# Patient Record
Sex: FEMALE | Race: WHITE | NOT HISPANIC OR LATINO | Employment: OTHER | URBAN - METROPOLITAN AREA
[De-identification: names, ages, dates, MRNs, and addresses within clinical notes are randomized per-mention and may not be internally consistent; named-entity substitution may affect disease eponyms.]

---

## 2017-01-16 DIAGNOSIS — E89.40 SURGICAL MENOPAUSE: ICD-10-CM

## 2017-01-16 NOTE — TELEPHONE ENCOUNTER
----- Message from Ephraim Cowart sent at 2017  8:30 AM CST -----  Contact: Patient  Jessi Linton  MRN: [id number]  : 1965  PCP: David Allred  Home Phone      [phone]  Work Phone      Not on file.  Mobile          [phone]      MESSAGE: requesting refill on Premarin -- pharmacy states Rx is inactive -- uses [facility]    Call [phone]    PCP: Brigida

## 2017-02-22 RX ORDER — PROPRANOLOL HYDROCHLORIDE 20 MG/1
TABLET ORAL
Qty: 60 TABLET | Refills: 5 | Status: SHIPPED | OUTPATIENT
Start: 2017-02-22 | End: 2017-09-09 | Stop reason: SDUPTHER

## 2017-03-13 DIAGNOSIS — R05.9 COUGH: ICD-10-CM

## 2017-03-13 DIAGNOSIS — J45.41 MODERATE PERSISTENT ASTHMA WITH ACUTE EXACERBATION: ICD-10-CM

## 2017-03-13 RX ORDER — ALBUTEROL SULFATE 0.83 MG/ML
SOLUTION RESPIRATORY (INHALATION)
Qty: 375 ML | Refills: 1 | Status: SHIPPED | OUTPATIENT
Start: 2017-03-13 | End: 2017-08-25 | Stop reason: SDUPTHER

## 2017-04-17 ENCOUNTER — OFFICE VISIT (OUTPATIENT)
Dept: FAMILY MEDICINE | Facility: CLINIC | Age: 52
End: 2017-04-17
Payer: MEDICAID

## 2017-04-17 VITALS
BODY MASS INDEX: 46.29 KG/M2 | HEIGHT: 55 IN | DIASTOLIC BLOOD PRESSURE: 70 MMHG | WEIGHT: 200 LBS | HEART RATE: 92 BPM | SYSTOLIC BLOOD PRESSURE: 124 MMHG

## 2017-04-17 DIAGNOSIS — R91.1 LUNG NODULE: Primary | ICD-10-CM

## 2017-04-17 DIAGNOSIS — R06.02 SOB (SHORTNESS OF BREATH): ICD-10-CM

## 2017-04-17 PROCEDURE — 99999 PR PBB SHADOW E&M-EST. PATIENT-LVL II: CPT | Mod: PBBFAC,,, | Performed by: FAMILY MEDICINE

## 2017-04-17 PROCEDURE — 99213 OFFICE O/P EST LOW 20 MIN: CPT | Mod: S$PBB,,, | Performed by: FAMILY MEDICINE

## 2017-04-17 PROCEDURE — 99212 OFFICE O/P EST SF 10 MIN: CPT | Mod: PBBFAC | Performed by: FAMILY MEDICINE

## 2017-04-17 RX ORDER — ASPIRIN 81 MG/1
81 TABLET ORAL DAILY
COMMUNITY

## 2017-04-17 NOTE — PROGRESS NOTES
Subjective:       Patient ID: Jessi Linton is a 51 y.o. female.    Chief Complaint: Follow-up    Pt is a 51 y.o. female who presents for evaluation and management of   Encounter Diagnoses   Name Primary?    Lung nodule Yes    SOB (shortness of breath)    Due for repeat CT scan  SOB improved with weight loss and albuterol nebs BID   Seeing Kris for this     Doing well on current meds. Denies any side effects. Prevention is up to date.  Review of Systems   Respiratory: Positive for shortness of breath.    Cardiovascular: Negative for chest pain.       Objective:      Physical Exam   Constitutional: She is oriented to person, place, and time. She appears well-developed and well-nourished.   HENT:   Head: Normocephalic and atraumatic.   Right Ear: External ear normal.   Left Ear: External ear normal.   Nose: Nose normal.   Mouth/Throat: Oropharynx is clear and moist.   Eyes: EOM are normal. Pupils are equal, round, and reactive to light.   Neck: Normal range of motion. Neck supple. No JVD present. No tracheal deviation present. No thyromegaly present.   Cardiovascular: Normal rate, normal heart sounds and intact distal pulses.    No murmur heard.  Pulmonary/Chest: Effort normal and breath sounds normal. No respiratory distress. She has no wheezes. She has no rales. She exhibits no tenderness.   Abdominal: Soft. Bowel sounds are normal. She exhibits no distension and no mass. There is no tenderness. There is no rebound and no guarding.   Musculoskeletal: Normal range of motion. She exhibits no edema or tenderness.   Lymphadenopathy:     She has no cervical adenopathy.   Neurological: She is alert and oriented to person, place, and time. She has normal reflexes. She displays normal reflexes. No cranial nerve deficit. She exhibits normal muscle tone. Coordination normal.   Skin: Skin is warm and dry. No rash noted. No erythema. No pallor.   Psychiatric: She has a normal mood and affect. Her behavior is normal.  Judgment and thought content normal.       Assessment:       1. Lung nodule    2. SOB (shortness of breath)        Plan:   Jessi Conway was seen today for follow-up.    Diagnoses and all orders for this visit:    Lung nodule  -     CT Chest With Contrast; Future    SOB (shortness of breath)    continue albuterol PRN  congrats on weight loss! Fabulous! Continue     RTC 6 months     No Follow-up on file.

## 2017-04-17 NOTE — MR AVS SNAPSHOT
[address] [address]  Phone: [phone]  Fax: [phone]                  Jessi Linton   2017 3:00 PM   Office Visit    Description:  Female : 1965   Provider:  David Allred MD   Department:  [facility]           Reason for Visit     Follow-up           Diagnoses this Visit        Comments    Lung nodule    -  Primary     SOB (shortness of breath)                To Do List           Future Appointments        Provider Department Dept Phone    2017 9:00 AM STAH CT1 LIMIT [address] [facility] [place] [phone]    2017 8:00 AM David Allred MD [facility] [phone]      Goals (5 Years of Data)     None      [facility]     [facility] Nurse Care Line -  Assistance  Unless otherwise directed by your provider, please contact [facility], our nurse care line that is available for  assistance.     Registered nurses in the [facility] provide: appointment scheduling, clinical advisement, health education, and other advisory services.  Call: [phone] (toll free)               Medications           Message regarding Medications     Verify the changes and/or additions to your medication regime listed below are the same as discussed with your clinician today.  If any of these changes or additions are incorrect, please notify your healthcare provider.             Verify that the below list of medications is an accurate representation of the medications you are currently taking.  If none reported, the list may be blank. If incorrect, please contact your healthcare provider. Carry this list with you in case of emergency.           Current Medications     albuterol (PROVENTIL) 2.5 mg /3 mL (0.083 %) nebulizer solution ONE AMPULE NEBULIZED EVERY 6 HOURS AS NEEDED FOR WHEEZING OR SHORTNESS OF BREATH OR COUGH    albuterol 90 mcg/actuation inhaler Inhale 2  puffs into the lungs every 6 (six) hours as needed for Wheezing.    ascorbic acid (VITAMIN C) 500 MG tablet Take 500 mg by mouth once daily.    aspirin (ECOTRIN) 81 MG EC tablet Take 81 mg by mouth once daily.    azelastine (ASTELIN) 137 mcg (0.1 %) nasal spray 1 spray (137 mcg total) by Nasal route 2 (two) times daily.    blood sugar diagnostic (ONE TOUCH ULTRA TEST) Strp 1 strip by Misc.(Non-Drug; Combo Route) route 3 (three) times daily. Ultra one touch test strips Dx 250.00 Dr Allred    blood-glucose meter Misc 1 each by Misc.(Non-Drug; Combo Route) route 3 (three) times daily. Ultra one touch Glucometer Dx 250.00 Dr Allred    calcium carbonate (OS-DENNY) 600 mg (1,500 mg) Tab Take 600 mg by mouth 2 (two) times daily with meals. Vitamin D : 800 International Units    DIPHENHYDRAMINE HCL (BENADRYL ALLERGY ORAL) Take by mouth.    estrogens, conjugated, (PREMARIN) 0.625 MG tablet Take 1 tablet (0.625 mg total) by mouth once daily.    fish oil-omega-3 fatty acids 300-1,000 mg capsule Take 2 g by mouth once daily.    lancets Misc 1 each by Misc.(Non-Drug; Combo Route) route 3 (three) times daily. Ultra one touch lancets Dx 250.00 Dr Allred    loratadine (CLARITIN) 10 mg tablet Take 1 tablet (10 mg total) by mouth once daily.    meclizine (ANTIVERT) 25 mg tablet Take 1 tablet (25 mg total) by mouth 3 (three) times daily as needed for Dizziness.    montelukast (SINGULAIR) 10 mg tablet TAKE 1 TABLET BY MOUTH EVERY EVENING    multivitamin with minerals tablet Take 1 tablet by mouth once daily. Equate Women' 50+ Adult    ondansetron (ZOFRAN, AS HYDROCHLORIDE,) 8 MG tablet Take 1 tablet (8 mg total) by mouth every 8 (eight) hours as needed for Nausea.    promethazine (PHENERGAN) 12.5 MG Tab Take 1 tablet (12.5 mg total) by mouth every 6 (six) hours as needed.    propranolol (INDERAL) 20 MG tablet TAKE 1 TABLET BY MOUTH 2 TIMES DAILY.    ranitidine (ZANTAC) 150 MG tablet Take 150 mg by mouth 2 (two) times daily.     "sumatriptan (IMITREX) 50 MG tablet 1 po with onset of migraine.  Repeat in 2 hours if no relief.  Never exceed more than 2 in a 24 hour period.    clotrimazole (LOTRIMIN) 1 % cream Apply topically 2 (two) times daily.    triamcinolone acetonide 0.1% (KENALOG) 0.1 % ointment 1 application 2 (two) times daily. Apply to affected area           Clinical Reference Information           Your Vitals Were     BP Pulse Height Weight BMI    124/70 (BP Location: Left arm, Patient Position: Sitting, BP Method: Manual) 92 4' 7" (1.397 m) 90.7 kg (200 lb) 46.48 kg/m2      Blood Pressure          Most Recent Value    BP  124/70      Allergies as of 4/17/2017     No Known Allergies      Immunizations Administered on Date of Encounter - 4/17/2017     None      Orders Placed During Today's Visit     Future Labs/Procedures Expected by Expires    CT Chest With Contrast  4/17/2017 4/17/2018      [facility] Sign-Up     Activating your [facility] account is as easy as 1-2-3!     1) Visit [email or web address], select Sign Up Now, enter this activation code and your date of birth, then select Next.  [email or web address]  Expires: 6/1/2017  3:59 PM      2) Create a username and password to use when you visit [facility] in the future and select a security question in case you lose your password and select Next.    3) Enter your e-mail address and click Sign Up!    Additional Information  If you have questions, please e-mail [email or web address] or call [phone] to talk to our [facility] staff. Remember, [facility] is NOT to be used for urgent needs. For medical emergencies, dial 911.         Language Assistance Services     ATTENTION: Language assistance services are available, free of charge. Please call [phone].      ATENCIÓN: Si habla español, tiene a batista disposición servicios gratuitos de asistencia lingüística. Llame al [phone].     CHÚ Ý: N?u b?n nói Ti?ng Vi?t, có các d?ch v? h? tr? ngôn ng? mi?n phí dành cho b?n. G?i s? " [phone].         [facility] complies with applicable Federal civil rights laws and does not discriminate on the basis of race, color, national origin, age, disability, or sex.

## 2017-04-20 ENCOUNTER — TELEPHONE (OUTPATIENT)
Dept: INTERNAL MEDICINE | Facility: CLINIC | Age: 52
End: 2017-04-20

## 2017-04-20 NOTE — TELEPHONE ENCOUNTER
----- Message from Ana Paula Holcomb sent at 2017 10:40 AM CDT -----  Contact: self  Jessi Linton  MRN: [id number]  : 1965  PCP: David Allred  Home Phone      [phone]  Work Phone      Not on file.  [employer]          [phone]      MESSAGE:   Needs to discuss CT scan that is scheduled for tomorrow.    Phone:  [phone]

## 2017-04-21 ENCOUNTER — HOSPITAL ENCOUNTER (OUTPATIENT)
Dept: RADIOLOGY | Facility: HOSPITAL | Age: 52
Discharge: HOME | End: 2017-04-21
Attending: FAMILY MEDICINE
Payer: MEDICAID

## 2017-04-21 DIAGNOSIS — R91.1 LUNG NODULE: ICD-10-CM

## 2017-04-21 PROCEDURE — 71260 CT THORAX DX C+: CPT | Mod: TC

## 2017-04-21 PROCEDURE — 25500020 PHARM REV CODE 255: Performed by: FAMILY MEDICINE

## 2017-04-21 PROCEDURE — 71260 CT THORAX DX C+: CPT | Mod: 26,,, | Performed by: RADIOLOGY

## 2017-04-21 RX ADMIN — IOHEXOL 75 ML: 350 INJECTION, SOLUTION INTRAVENOUS at 09:04

## 2017-05-04 ENCOUNTER — TELEPHONE (OUTPATIENT)
Dept: FAMILY MEDICINE | Facility: CLINIC | Age: 52
End: 2017-05-04

## 2017-05-04 NOTE — TELEPHONE ENCOUNTER
----- Message from Ephraim Cowart sent at 2017  2:49 PM CDT -----  Contact: Jens - Kassandra Linton  MRN: [id number]  : 1965  PCP: David Allred  Home Phone      [phone]  Work Phone      Not on file.  Mobile          [phone]      MESSAGE:  had CT done -- shows possible cyst on her liver -- please advise    Call [phone]    PCP: Brigida

## 2017-05-05 NOTE — TELEPHONE ENCOUNTER
"This is not a concerning finding, a small liver cyst that is. The radiologist is not sure that he actually saw a cyst based on his report. It is "questionable." I am not concerned.  She should be concerned about the fatty liver, however, as that can progress to cirrhosis of the liver in some cases. She is working on it tho and has loss a lot of weight. I am very proud of her! Keep it up, Sand!  "

## 2017-05-12 ENCOUNTER — TELEPHONE (OUTPATIENT)
Dept: FAMILY MEDICINE | Facility: CLINIC | Age: 52
End: 2017-05-12

## 2017-05-12 DIAGNOSIS — J45.30 ASTHMA IN ADULT, MILD PERSISTENT, UNCOMPLICATED: Primary | ICD-10-CM

## 2017-05-12 NOTE — TELEPHONE ENCOUNTER
Message from Ephraim Cowart sent at 5/12/2017 4:39 PM CDT -----  Contact: Patient  Jessi Linton    Home Phone        MESSAGE: needs new nebulizer -- [facility] not take medicaid -- please advise     Call [phone]     PCP: Brigida

## 2017-05-16 ENCOUNTER — TELEPHONE (OUTPATIENT)
Dept: FAMILY MEDICINE | Facility: CLINIC | Age: 52
End: 2017-05-16

## 2017-05-16 NOTE — TELEPHONE ENCOUNTER
----- Message from Ephraim Cowart sent at 2017 10:36 AM CDT -----  Contact: Patient  Jessi Linton  MRN: [id number]  : 1965  PCP: David Allred  Home Phone      [phone]  Work Phone      Not on file.  Mobile          [phone]      MESSAGE: called last week for new nebulizer -- no response -- please check status of request    Call [phone]    PCP: Brigida

## 2017-05-23 ENCOUNTER — TELEPHONE (OUTPATIENT)
Dept: FAMILY MEDICINE | Facility: CLINIC | Age: 52
End: 2017-05-23

## 2017-05-23 NOTE — TELEPHONE ENCOUNTER
----- Message from Ning Lima sent at 2017  9:09 AM CDT -----  Contact: self  Jessi Linton  MRN: [id number]  : 1965  PCP: David Allred  Home Phone      [phone]  Work Phone      Not on file.  [employer]          [phone]      MESSAGE:    [facility] does not accept insurance for cpap equipment would like to know who would be able to accept it?    Phone:  [phone]

## 2017-06-19 ENCOUNTER — TELEPHONE (OUTPATIENT)
Dept: FAMILY MEDICINE | Facility: CLINIC | Age: 52
End: 2017-06-19

## 2017-06-19 NOTE — TELEPHONE ENCOUNTER
----- Message from Ephraim Cowart sent at 2017  8:19 AM CDT -----  Contact: Mom - Kassandra Linton  MRN: [id number]  : 1965  PCP: David Allred  Home Phone      [phone]  Work Phone      Not on file.  Mobile          [phone]      MESSAGE: [facility] has opened up to seeing medicaid patients -- mom wants to discuss scheduling    Call [phone] or [phone]    PCP:Brigida

## 2017-06-19 NOTE — TELEPHONE ENCOUNTER
Pts mother stated she call [facility], and found out that the pulmonary dept is now accepting Medicaid pts. She would like your opinion on if she should get established with an [facility] just in case she needs to see one in the future. Please advise

## 2017-06-20 NOTE — TELEPHONE ENCOUNTER
I do not think it is necessary at the moment, but if they want a referral my feelings wont be hurt.

## 2017-06-22 ENCOUNTER — TELEPHONE (OUTPATIENT)
Dept: FAMILY MEDICINE | Facility: CLINIC | Age: 52
End: 2017-06-22

## 2017-06-22 NOTE — TELEPHONE ENCOUNTER
----- Message from Lucy Ramirez sent at 2017  3:46 PM CDT -----  Contact: SELF  Jessi Linton  MRN: [id number]  : 1965  PCP: David Allred  Home Phone      [phone]  Work Phone      Not on file.  [employer]          [phone]      MESSAGE: ASKING FOR DR ALLRED NURSE TO RETURN HER CALL REGARDING PAPERWORK [facility]? PLEASE CALL    PHONE: [phone]

## 2017-07-06 DIAGNOSIS — E89.40 SURGICAL MENOPAUSE: ICD-10-CM

## 2017-07-06 RX ORDER — ESTROGENS, CONJUGATED 0.62 MG/1
0.62 TABLET, FILM COATED ORAL DAILY
Qty: 30 TABLET | Refills: 5 | Status: SHIPPED | OUTPATIENT
Start: 2017-07-06 | End: 2017-12-30 | Stop reason: SDUPTHER

## 2017-07-16 RX ORDER — MONTELUKAST SODIUM 10 MG/1
TABLET ORAL
Qty: 30 TABLET | Refills: 5 | Status: SHIPPED | OUTPATIENT
Start: 2017-07-16 | End: 2017-12-30 | Stop reason: SDUPTHER

## 2017-07-18 ENCOUNTER — OFFICE VISIT (OUTPATIENT)
Dept: FAMILY MEDICINE | Facility: CLINIC | Age: 52
End: 2017-07-18
Payer: MEDICAID

## 2017-07-18 VITALS
WEIGHT: 199.31 LBS | HEIGHT: 55 IN | DIASTOLIC BLOOD PRESSURE: 76 MMHG | BODY MASS INDEX: 46.13 KG/M2 | HEART RATE: 80 BPM | RESPIRATION RATE: 20 BRPM | SYSTOLIC BLOOD PRESSURE: 128 MMHG

## 2017-07-18 DIAGNOSIS — R42 VERTIGO: ICD-10-CM

## 2017-07-18 DIAGNOSIS — H92.01 OTALGIA OF RIGHT EAR: Primary | ICD-10-CM

## 2017-07-18 PROCEDURE — 95992 CANALITH REPOSITIONING PROC: CPT | Mod: S$PBB,,, | Performed by: FAMILY MEDICINE

## 2017-07-18 PROCEDURE — 95992 CANALITH REPOSITIONING PROC: CPT | Mod: PBBFAC | Performed by: FAMILY MEDICINE

## 2017-07-18 PROCEDURE — 99213 OFFICE O/P EST LOW 20 MIN: CPT | Mod: 25,S$PBB,, | Performed by: FAMILY MEDICINE

## 2017-07-18 PROCEDURE — 99999 PR PBB SHADOW E&M-EST. PATIENT-LVL II: CPT | Mod: PBBFAC,,, | Performed by: FAMILY MEDICINE

## 2017-07-18 PROCEDURE — 99212 OFFICE O/P EST SF 10 MIN: CPT | Mod: PBBFAC | Performed by: FAMILY MEDICINE

## 2017-07-18 NOTE — PROGRESS NOTES
Subjective:       Patient ID: Jessi Linton is a 51 y.o. female.    Chief Complaint: Otalgia (right ear x 3 days) and Dizziness    Pt is a 51 y.o. female who presents for evaluation and management of   Encounter Diagnoses   Name Primary?    Otalgia of right ear Yes    Vertigo    .  Doing well on current meds. Denies any side effects. Prevention is up to date.    Review of Systems   Constitutional:        Fever of 100   HENT: Positive for ear pain and hearing loss. Negative for ear discharge and sore throat.    Respiratory: Positive for cough.    Gastrointestinal: Negative for nausea and vomiting.   Neurological: Positive for dizziness.       Objective:      Physical Exam   Constitutional: She is oriented to person, place, and time. She appears well-developed and well-nourished.   HENT:   Head: Normocephalic and atraumatic.   Right Ear: External ear normal.   Left Ear: External ear normal.   Nose: Nose normal.   Mouth/Throat: Oropharynx is clear and moist.   Eyes: EOM are normal. Pupils are equal, round, and reactive to light.   Neck: Normal range of motion. Neck supple. No JVD present. No tracheal deviation present. No thyromegaly present.   Cardiovascular: Normal rate, normal heart sounds and intact distal pulses.    No murmur heard.  Pulmonary/Chest: Effort normal and breath sounds normal. No respiratory distress. She has no wheezes. She has no rales. She exhibits no tenderness.   Abdominal: Soft. Bowel sounds are normal. She exhibits no distension and no mass. There is no tenderness. There is no rebound and no guarding.   Musculoskeletal: Normal range of motion. She exhibits no edema or tenderness.   Lymphadenopathy:     She has no cervical adenopathy.   Neurological: She is alert and oriented to person, place, and time. She has normal reflexes. She displays normal reflexes. No cranial nerve deficit. She exhibits normal muscle tone. Coordination normal.   Skin: Skin is warm and dry. No rash noted. No  erythema. No pallor.   Psychiatric: She has a normal mood and affect. Her behavior is normal. Judgment and thought content normal.       Assessment:       1. Otalgia of right ear    2. Vertigo        Plan:   Jessi Conway was seen today for otalgia and dizziness.    Diagnoses and all orders for this visit:    Otalgia of right ear    Vertigo    epley's maneuver performed   Meclizine  RTC if condition acutely worsens or any other concerns, otherwise RTC as scheduled    No Follow-up on file.

## 2017-08-10 ENCOUNTER — TELEPHONE (OUTPATIENT)
Dept: FAMILY MEDICINE | Facility: CLINIC | Age: 52
End: 2017-08-10

## 2017-08-10 DIAGNOSIS — Z12.39 BREAST CANCER SCREENING: Primary | ICD-10-CM

## 2017-08-10 NOTE — TELEPHONE ENCOUNTER
----- Message from Terra Jerome sent at 8/10/2017  9:30 AM CDT -----  Contact: Pt  Jessi Linton  MRN: [id number]  : 1965  PCP: David Allred  Home Phone      [phone]  Work Phone      Not on file.  Mobile          [phone]      MESSAGE:   Pt is going to [place] soon and needs us to write a script explaining why she needs to bring her own food into the restaurants with her because she's on a special diet.    Phone: [phone]

## 2017-08-10 NOTE — TELEPHONE ENCOUNTER
----- Message from Shea Davis sent at 8/10/2017  8:02 AM CDT -----  Contact: Self  Jessi Linton  MRN: [id number]  : 1965  PCP: David Allred  Home Phone      [phone]  Work Phone      Not on file.  Mobile          [phone]    MESSAGE:   Would like to set up Mammogram.  She received a notice that she's due to have done.    Phone:  [phone]

## 2017-08-10 NOTE — LETTER
[address] [address]  Phone: [phone]  Fax: [phone] August 10, 2017     Patient: Jessi Linton   YOB: 1965   Date of Visit: 8/10/2017       To Whom It May Concern:    Jessi is on a special strict weight loss diet and has to bring her own prepared food with her wherever she goes. She cannot eat restaurant food. Please accommodate her if possible. Thank you.     If you have any questions or concerns, please don't hesitate to contact my office.    Sincerely,        David Allred MD

## 2017-08-13 RX ORDER — BLOOD SUGAR DIAGNOSTIC
STRIP MISCELLANEOUS
Qty: 100 STRIP | Refills: 1 | Status: SHIPPED | OUTPATIENT
Start: 2017-08-13 | End: 2023-10-03 | Stop reason: SDUPTHER

## 2017-08-23 ENCOUNTER — HOSPITAL ENCOUNTER (OUTPATIENT)
Dept: RADIOLOGY | Facility: HOSPITAL | Age: 52
Discharge: HOME | End: 2017-08-23
Attending: FAMILY MEDICINE
Payer: MEDICAID

## 2017-08-23 VITALS — HEIGHT: 55 IN | BODY MASS INDEX: 46.05 KG/M2 | WEIGHT: 199 LBS

## 2017-08-23 DIAGNOSIS — Z12.39 BREAST CANCER SCREENING: ICD-10-CM

## 2017-08-23 PROCEDURE — 77067 SCR MAMMO BI INCL CAD: CPT | Mod: TC

## 2017-08-23 PROCEDURE — 77067 SCR MAMMO BI INCL CAD: CPT | Mod: 26,,, | Performed by: RADIOLOGY

## 2017-08-23 PROCEDURE — 77063 BREAST TOMOSYNTHESIS BI: CPT | Mod: 26,,, | Performed by: RADIOLOGY

## 2017-08-25 DIAGNOSIS — R05.9 COUGH: ICD-10-CM

## 2017-08-25 DIAGNOSIS — J45.41 MODERATE PERSISTENT ASTHMA WITH ACUTE EXACERBATION: ICD-10-CM

## 2017-08-27 RX ORDER — ALBUTEROL SULFATE 0.83 MG/ML
SOLUTION RESPIRATORY (INHALATION)
Qty: 375 ML | Refills: 1 | Status: SHIPPED | OUTPATIENT
Start: 2017-08-27 | End: 2017-12-05 | Stop reason: SDUPTHER

## 2017-09-10 RX ORDER — PROPRANOLOL HYDROCHLORIDE 20 MG/1
TABLET ORAL
Qty: 60 TABLET | Refills: 5 | Status: SHIPPED | OUTPATIENT
Start: 2017-09-10 | End: 2018-03-02 | Stop reason: SDUPTHER

## 2017-09-15 ENCOUNTER — TELEPHONE (OUTPATIENT)
Dept: FAMILY MEDICINE | Facility: CLINIC | Age: 52
End: 2017-09-15

## 2017-09-15 ENCOUNTER — CLINICAL SUPPORT (OUTPATIENT)
Dept: FAMILY MEDICINE | Facility: CLINIC | Age: 52
End: 2017-09-15
Payer: MEDICAID

## 2017-09-15 DIAGNOSIS — N39.0 URINARY TRACT INFECTION WITHOUT HEMATURIA, SITE UNSPECIFIED: Primary | ICD-10-CM

## 2017-09-15 DIAGNOSIS — R30.0 DYSURIA: Primary | ICD-10-CM

## 2017-09-15 PROCEDURE — 99211 OFF/OP EST MAY X REQ PHY/QHP: CPT | Mod: PBBFAC

## 2017-09-15 PROCEDURE — 99999 PR PBB SHADOW E&M-EST. PATIENT-LVL I: CPT | Mod: PBBFAC,,,

## 2017-09-15 PROCEDURE — 87086 URINE CULTURE/COLONY COUNT: CPT

## 2017-09-15 PROCEDURE — 81000 URINALYSIS NONAUTO W/SCOPE: CPT | Mod: PBBFAC

## 2017-09-15 RX ORDER — NITROFURANTOIN (MACROCRYSTALS) 100 MG/1
100 CAPSULE ORAL EVERY 12 HOURS
Qty: 14 CAPSULE | Refills: 0 | Status: SHIPPED | OUTPATIENT
Start: 2017-09-15 | End: 2017-09-25

## 2017-09-15 NOTE — TELEPHONE ENCOUNTER
Allergies: No known    Symptoms: leakage, burning urination, pressure when urinating    Pharmacy: [facility]

## 2017-09-15 NOTE — TELEPHONE ENCOUNTER
----- Message from Ephraim Cowart sent at 9/15/2017  8:32 AM CDT -----  Contact: Patient  Jessi Linton  MRN: [id number]  : 1965  PCP: David Allred  Home Phone      [phone]  Work Phone      Not on file.  Mobile          [phone]      MESSAGE: possible UTI -- requesting appt today    Call [phone]    PCP: Brigida

## 2017-09-17 LAB — BACTERIA UR CULT: NO GROWTH

## 2017-09-20 ENCOUNTER — TELEPHONE (OUTPATIENT)
Dept: FAMILY MEDICINE | Facility: CLINIC | Age: 52
End: 2017-09-20

## 2017-09-20 NOTE — TELEPHONE ENCOUNTER
Patient's mother called about urine cx-gave her results of no growth-mother states patient is still having pressure in lower abdomin-patient will follow up with PCP-DR GARCIA

## 2017-09-21 ENCOUNTER — OFFICE VISIT (OUTPATIENT)
Dept: FAMILY MEDICINE | Facility: CLINIC | Age: 52
End: 2017-09-21
Payer: MEDICAID

## 2017-09-21 ENCOUNTER — TELEPHONE (OUTPATIENT)
Dept: FAMILY MEDICINE | Facility: CLINIC | Age: 52
End: 2017-09-21

## 2017-09-21 VITALS
HEART RATE: 72 BPM | WEIGHT: 196.81 LBS | SYSTOLIC BLOOD PRESSURE: 116 MMHG | OXYGEN SATURATION: 97 % | RESPIRATION RATE: 20 BRPM | BODY MASS INDEX: 45.55 KG/M2 | DIASTOLIC BLOOD PRESSURE: 68 MMHG | HEIGHT: 55 IN

## 2017-09-21 DIAGNOSIS — R35.0 INCREASED FREQUENCY OF URINATION: Primary | ICD-10-CM

## 2017-09-21 PROCEDURE — 3008F BODY MASS INDEX DOCD: CPT | Mod: ,,, | Performed by: FAMILY MEDICINE

## 2017-09-21 PROCEDURE — 81001 URINALYSIS AUTO W/SCOPE: CPT | Mod: PBBFAC | Performed by: FAMILY MEDICINE

## 2017-09-21 PROCEDURE — 99214 OFFICE O/P EST MOD 30 MIN: CPT | Mod: S$PBB,,, | Performed by: FAMILY MEDICINE

## 2017-09-21 PROCEDURE — 99999 PR PBB SHADOW E&M-EST. PATIENT-LVL V: CPT | Mod: PBBFAC,,, | Performed by: FAMILY MEDICINE

## 2017-09-21 PROCEDURE — 99215 OFFICE O/P EST HI 40 MIN: CPT | Mod: PBBFAC | Performed by: FAMILY MEDICINE

## 2017-09-21 RX ORDER — OXYBUTYNIN CHLORIDE 5 MG/1
5 TABLET, EXTENDED RELEASE ORAL DAILY
Qty: 30 TABLET | Refills: 11 | Status: SHIPPED | OUTPATIENT
Start: 2017-09-21 | End: 2017-12-08 | Stop reason: SDUPTHER

## 2017-09-21 RX ORDER — ESTRADIOL 0.1 MG/G
1 CREAM VAGINAL
Qty: 8 G | Refills: 11 | Status: SHIPPED | OUTPATIENT
Start: 2017-09-21 | End: 2018-01-24

## 2017-09-21 NOTE — TELEPHONE ENCOUNTER
----- Message from Ana Paula Holcomb MA sent at 2017 10:14 AM CDT -----  Contact: Jalyn / mother  Jessi Linton  MRN: [id number]  : 1965  PCP: David Allred  Home Phone      [phone]  Work Phone      Not on file.  Mobile          [phone]      MESSAGE:   Would like to be seen today or tomorrow for pressure on bladder.  Stated has an appt for Monday, but doesn't think she can wait that long.    Phone:  [phone]

## 2017-09-21 NOTE — PROGRESS NOTES
Subjective:       Patient ID: Jessi Linton is a 51 y.o. female.    Chief Complaint: Urinary Tract Infection    HPI  51 year old nayeli comes in with c/o increased bladder pressure and increased frequency since last Thursday night. She says this started out of the blue. She had an episode of incontinence. This had never happened before. She brought in a urine sample and was started on macrobid last week. Her increased frequency and pressure have not improved. She says she has had no more incontinence.    PMH, PSH, ALLERGIES, SH, FH reviewed in nurse's notes above  Medications reconciled in the nurse's notes    Review of Systems   Constitutional: Negative for chills and fever.   HENT: Negative for congestion, ear pain, postnasal drip, rhinorrhea, sore throat and trouble swallowing.    Eyes: Negative for redness and itching.   Respiratory: Negative for cough, shortness of breath and wheezing.    Cardiovascular: Negative for chest pain and palpitations.   Gastrointestinal: Negative for abdominal pain, diarrhea, nausea and vomiting.   Genitourinary: Positive for frequency. Negative for dysuria.        No nocturia   Skin: Negative for rash.   Neurological: Negative for weakness and headaches.       Objective:      Physical Exam   Constitutional: She is oriented to person, place, and time. She appears well-developed. No distress.   HENT:   Head: Normocephalic and atraumatic.   Eyes: Conjunctivae are normal. Pupils are equal, round, and reactive to light.   Neck: Normal range of motion. Neck supple. No thyromegaly present.   Cardiovascular: Normal rate, regular rhythm, normal heart sounds and intact distal pulses.    Pulmonary/Chest: Effort normal and breath sounds normal. No respiratory distress. She has no wheezes.   Abdominal: Soft. Bowel sounds are normal. There is no tenderness.   Genitourinary:         Musculoskeletal: Normal range of motion. She exhibits no edema.   Lymphadenopathy:     She has no cervical  adenopathy.   Neurological: She is alert and oriented to person, place, and time.   Skin: Skin is warm and dry. No rash noted.   Psychiatric: She has a normal mood and affect. Her behavior is normal.   Nursing note and vitals reviewed.       Assessment/Plan:       Problem List Items Addressed This Visit     None      Visit Diagnoses     Increased frequency of urination    -  Primary    Relevant Medications    estradiol (ESTRACE) 0.01 % (0.1 mg/gram) vaginal cream    oxybutynin (DITROPAN-XL) 5 MG TR24    Other Relevant Orders    POCT URINE DIPSTICK WITH MICROSCOPE, AUTOMATED (Completed)    POCT Urine Sediment Exam        Treat for incontinence and bladder spasm.    Urine clear and urine culture negative.    RTC if condition acutely worsens or any other concerns, otherwise RTC as scheduled    Side effects of meds reviewed with patient and her mom.

## 2017-09-22 LAB
BACTERIA SPEC CULT: NORMAL
BILIRUB SERPL-MCNC: 0 MG/DL
BLOOD URINE, POC: 0
CASTS: 0
COLOR, POC UA: NORMAL
CRYSTALS: 0
GLUCOSE UR QL STRIP: 0
KETONES UR QL STRIP: 0
LEUKOCYTE ESTERASE URINE, POC: 0
NITRITE, POC UA: 0
PH, POC UA: 8
PROTEIN, POC: 0
RBC CELLS COUNTED: 0
SPECIFIC GRAVITY, POC UA: 1
UROBILINOGEN, POC UA: 0
WHITE BLOOD CELLS: 0

## 2017-09-26 ENCOUNTER — OFFICE VISIT (OUTPATIENT)
Dept: FAMILY MEDICINE | Facility: CLINIC | Age: 52
End: 2017-09-26
Payer: MEDICAID

## 2017-09-26 VITALS
HEIGHT: 55 IN | OXYGEN SATURATION: 96 % | HEART RATE: 88 BPM | SYSTOLIC BLOOD PRESSURE: 118 MMHG | BODY MASS INDEX: 45.55 KG/M2 | DIASTOLIC BLOOD PRESSURE: 78 MMHG | WEIGHT: 196.81 LBS

## 2017-09-26 DIAGNOSIS — R39.89 SENSATION OF PRESSURE IN BLADDER AREA: Primary | ICD-10-CM

## 2017-09-26 PROCEDURE — 99213 OFFICE O/P EST LOW 20 MIN: CPT | Mod: PBBFAC | Performed by: FAMILY MEDICINE

## 2017-09-26 PROCEDURE — 99213 OFFICE O/P EST LOW 20 MIN: CPT | Mod: S$PBB,,, | Performed by: FAMILY MEDICINE

## 2017-09-26 PROCEDURE — 99999 PR PBB SHADOW E&M-EST. PATIENT-LVL III: CPT | Mod: PBBFAC,,, | Performed by: FAMILY MEDICINE

## 2017-09-26 PROCEDURE — 3008F BODY MASS INDEX DOCD: CPT | Mod: ,,, | Performed by: FAMILY MEDICINE

## 2017-09-26 RX ORDER — PHENAZOPYRIDINE HYDROCHLORIDE 100 MG/1
100 TABLET, FILM COATED ORAL 3 TIMES DAILY PRN
Qty: 9 TABLET | Refills: 0 | Status: SHIPPED | OUTPATIENT
Start: 2017-09-26 | End: 2017-09-29

## 2017-09-26 NOTE — PROGRESS NOTES
Subjective:       Patient ID: Jessi Linton is a 51 y.o. female.    Chief Complaint: Follow-up    HPI  51 year old nayeli comes in with c/o increased bladder pressure and increased frequency since last Thursday night. She says this started out of the blue. She had an episode of incontinence. This had never happened before. She brought in a urine sample and was started on macrobid last week. Her increased frequency and pressure have not improved. She says she has had no more incontinence.    She notes that she is still having the same bladder pressure. No burning. No issues. She has had no improvement of her dribbling of her urine. She wears a panty liner and 'sometimes it is wet and sometimes it is not.'    PMH, PSH, ALLERGIES, SH, FH reviewed in nurse's notes above  Medications reconciled in the nurse's notes    Review of Systems   Constitutional: Negative for chills and fever.   HENT: Negative for congestion, ear pain, postnasal drip, rhinorrhea, sore throat and trouble swallowing.    Eyes: Negative for redness and itching.   Respiratory: Negative for cough, shortness of breath and wheezing.    Cardiovascular: Negative for chest pain and palpitations.   Gastrointestinal: Negative for abdominal pain, diarrhea, nausea and vomiting.   Genitourinary: Negative for dysuria and frequency.        No nocturia    Pressure to lower abdomen   Skin: Negative for rash.   Neurological: Negative for weakness and headaches.       Objective:      Physical Exam   Constitutional: She is oriented to person, place, and time. She appears well-developed. No distress.   HENT:   Head: Normocephalic and atraumatic.   Eyes: Conjunctivae are normal. Pupils are equal, round, and reactive to light.   Neck: Normal range of motion. Neck supple. No thyromegaly present.   Cardiovascular: Normal rate, regular rhythm, normal heart sounds and intact distal pulses.    Pulmonary/Chest: Effort normal and breath sounds normal. No respiratory  distress. She has no wheezes.   Abdominal: Soft. Bowel sounds are normal. There is no tenderness.   Genitourinary:         Musculoskeletal: Normal range of motion. She exhibits no edema.   Lymphadenopathy:     She has no cervical adenopathy.   Neurological: She is alert and oriented to person, place, and time.   Skin: Skin is warm and dry. No rash noted.   Psychiatric: She has a normal mood and affect. Her behavior is normal.   Nursing note and vitals reviewed.       Assessment/Plan:       Problem List Items Addressed This Visit     None      Visit Diagnoses     Sensation of pressure in bladder area    -  Primary    Relevant Medications    phenazopyridine (PYRIDIUM) 100 MG tablet        Treat for incontinence and bladder spasm.    RTC if condition acutely worsens or any other concerns, otherwise RTC as scheduled    Side effects of meds reviewed with patient and her mom.

## 2017-09-27 ENCOUNTER — TELEPHONE (OUTPATIENT)
Dept: FAMILY MEDICINE | Facility: CLINIC | Age: 52
End: 2017-09-27

## 2017-09-27 NOTE — TELEPHONE ENCOUNTER
----- Message from Roma Lawler MA sent at 2017  2:22 PM CDT -----  Contact: self  Jessi Linton  MRN: [id number]  : 1965  PCP: David Allred  Home Phone      [phone]  Work Phone      Not on file.  [employer]          [phone]      MESSAGE:  Wanted to let Dr Fuentes know that she didn't have any pressure last night.    Phone: [phone]

## 2017-09-28 ENCOUNTER — TELEPHONE (OUTPATIENT)
Dept: FAMILY MEDICINE | Facility: CLINIC | Age: 52
End: 2017-09-28

## 2017-09-28 NOTE — TELEPHONE ENCOUNTER
----- Message from Roma Lawler MA sent at 2017  9:43 AM CDT -----  Contact: self  Jessi Linton  MRN: [id number]  : 1965  PCP: David Allred  Home Phone      [phone]  Work Phone      Not on file.  Mobile          [phone]      MESSAGE: Wants to speak to nurse about medication. She is feeling better and didn't have any pressure last night. Please call to discuss medication.    Phone: [phone]

## 2017-09-28 NOTE — TELEPHONE ENCOUNTER
Pt wanted to know if she should stop taking Pyridium since she is no longer have pain. Advised her she can stop medication since she is no longer having symptoms. Verbalized understanding.

## 2017-10-09 ENCOUNTER — TELEPHONE (OUTPATIENT)
Dept: FAMILY MEDICINE | Facility: CLINIC | Age: 52
End: 2017-10-09

## 2017-10-09 NOTE — TELEPHONE ENCOUNTER
----- Message from Lucy Ramirez sent at 10/9/2017  8:13 AM CDT -----  Contact: SELF  Jessi Linton  MRN: [id number]  : 1965  PCP: David Allred  Home Phone      [phone]  Work Phone      Not on file.  Mobile          [phone]      MESSAGE: WANTS TO SPEAK WITH NURSE ABOUT WHETHER OR NOT SHE HAS TO CONTINUE USING ESTRADOL CREAM. PLEASE CALL     PHONE: [phone]

## 2017-10-09 NOTE — TELEPHONE ENCOUNTER
Contacted pt states she does not know if she needs to discontinue the estradol cream? Please advise, thank you.

## 2017-10-16 ENCOUNTER — OFFICE VISIT (OUTPATIENT)
Dept: FAMILY MEDICINE | Facility: CLINIC | Age: 52
End: 2017-10-16
Payer: MEDICAID

## 2017-10-16 VITALS
WEIGHT: 196.88 LBS | DIASTOLIC BLOOD PRESSURE: 70 MMHG | BODY MASS INDEX: 45.56 KG/M2 | HEART RATE: 62 BPM | SYSTOLIC BLOOD PRESSURE: 98 MMHG | RESPIRATION RATE: 20 BRPM | HEIGHT: 55 IN

## 2017-10-16 DIAGNOSIS — G43.009 MIGRAINE WITHOUT AURA AND WITHOUT STATUS MIGRAINOSUS, NOT INTRACTABLE: ICD-10-CM

## 2017-10-16 DIAGNOSIS — J30.1 CHRONIC SEASONAL ALLERGIC RHINITIS DUE TO POLLEN: ICD-10-CM

## 2017-10-16 DIAGNOSIS — K21.9 GASTROESOPHAGEAL REFLUX DISEASE, ESOPHAGITIS PRESENCE NOT SPECIFIED: ICD-10-CM

## 2017-10-16 DIAGNOSIS — G47.33 OBSTRUCTIVE SLEEP APNEA: ICD-10-CM

## 2017-10-16 DIAGNOSIS — R73.02 GLUCOSE INTOLERANCE (IMPAIRED GLUCOSE TOLERANCE): ICD-10-CM

## 2017-10-16 PROCEDURE — 99999 PR PBB SHADOW E&M-EST. PATIENT-LVL II: CPT | Mod: PBBFAC,,, | Performed by: FAMILY MEDICINE

## 2017-10-16 PROCEDURE — 90688 IIV4 VACCINE SPLT 0.5 ML IM: CPT | Mod: PBBFAC

## 2017-10-16 PROCEDURE — 99214 OFFICE O/P EST MOD 30 MIN: CPT | Mod: S$PBB,,, | Performed by: FAMILY MEDICINE

## 2017-10-16 PROCEDURE — 99212 OFFICE O/P EST SF 10 MIN: CPT | Mod: PBBFAC | Performed by: FAMILY MEDICINE

## 2017-10-16 RX ORDER — FLUTICASONE PROPIONATE 50 MCG
SPRAY, SUSPENSION (ML) NASAL
Refills: 5 | COMMUNITY
Start: 2017-08-15 | End: 2018-01-24

## 2017-10-16 NOTE — PROGRESS NOTES
Subjective:       Patient ID: Jessi Linton is a 51 y.o. female.    Chief Complaint: Follow-up (5 mo)    Pt is a 51 y.o. female who presents for evaluation and management of   Encounter Diagnoses   Name Primary?    Class 3 obesity due to excess calories without serious comorbidity with body mass index (BMI) of 40.0 to 44.9 in adult Yes    Obstructive sleep apnea     Glucose intolerance (impaired glucose tolerance)     Gastroesophageal reflux disease, esophagitis presence not specified     Migraine without aura and without status migrainosus, not intractable     Chronic seasonal allergic rhinitis due to pollen    .  Doing well on current meds. Denies any side effects. Prevention is up to date.  Review of Systems   Constitutional: Negative for chills and fever.   HENT: Negative for ear discharge, ear pain, hearing loss and sore throat.    Respiratory: Negative for cough and shortness of breath.    Cardiovascular: Negative for chest pain and palpitations.   Gastrointestinal: Negative for abdominal pain, blood in stool, constipation, nausea and vomiting.   Genitourinary: Positive for urgency. Negative for difficulty urinating.        Some stress incontinence    Neurological: Negative for dizziness and headaches.   Psychiatric/Behavioral: Negative for dysphoric mood, sleep disturbance and suicidal ideas. The patient is not nervous/anxious.        Objective:      Physical Exam   Constitutional: She is oriented to person, place, and time. She appears well-developed and well-nourished.   Truncal obesity      HENT:   Head: Normocephalic and atraumatic.   Right Ear: External ear normal.   Left Ear: External ear normal.   Nose: Nose normal.   Mouth/Throat: Oropharynx is clear and moist.   Eyes: EOM are normal. Pupils are equal, round, and reactive to light.   Neck: Normal range of motion. Neck supple. No JVD present. No tracheal deviation present. No thyromegaly present.   Cardiovascular: Normal rate, normal heart  sounds and intact distal pulses.    No murmur heard.  Pulmonary/Chest: Effort normal and breath sounds normal. No respiratory distress. She has no wheezes. She has no rales. She exhibits no tenderness.   Abdominal: Soft. Bowel sounds are normal. She exhibits no distension and no mass. There is no tenderness. There is no rebound and no guarding.   Musculoskeletal: Normal range of motion. She exhibits no edema or tenderness.   Lymphadenopathy:     She has no cervical adenopathy.   Neurological: She is alert and oriented to person, place, and time. She has normal reflexes. She displays normal reflexes. No cranial nerve deficit. She exhibits normal muscle tone. Coordination normal.   Skin: Skin is warm and dry. No rash noted. No erythema. No pallor.   Psychiatric: She has a normal mood and affect. Her behavior is normal. Judgment and thought content normal.       Assessment:       1. Class 3 obesity due to excess calories without serious comorbidity with body mass index (BMI) of 40.0 to 44.9 in adult    2. Obstructive sleep apnea    3. Glucose intolerance (impaired glucose tolerance)    4. Gastroesophageal reflux disease, esophagitis presence not specified    5. Migraine without aura and without status migrainosus, not intractable    6. Chronic seasonal allergic rhinitis due to pollen        Plan:   Jessi Conway was seen today for follow-up.    Diagnoses and all orders for this visit:    Class 3 obesity due to excess calories without serious comorbidity with body mass index (BMI) of 40.0 to 44.9 in adult  -     Lipid panel; Future  -     Hemoglobin A1c; Future  -     Comprehensive metabolic panel; Future  -     TSH; Future    Obstructive sleep apnea    Glucose intolerance (impaired glucose tolerance)  -     Hemoglobin A1c; Future    Gastroesophageal reflux disease, esophagitis presence not specified    Migraine without aura and without status migrainosus, not intractable    Chronic seasonal allergic rhinitis due to  pollen    continue same   Keep up the weight loss.  The patient is asked to make an attempt to improve diet and exercise patterns to aid in medical management of this problem.  5 small meals a day. Cut out white carbs: bread, rice, pasta, potatoes. Exercise/walk 5x/week for at least 30 minutes    rx for cpap supplies    RTC 6 months/prn                                                                                                                                                                                                              .    No Follow-up on file.

## 2017-10-17 ENCOUNTER — CLINICAL SUPPORT (OUTPATIENT)
Dept: FAMILY MEDICINE | Facility: CLINIC | Age: 52
End: 2017-10-17
Payer: MEDICAID

## 2017-10-17 DIAGNOSIS — R73.02 GLUCOSE INTOLERANCE (IMPAIRED GLUCOSE TOLERANCE): ICD-10-CM

## 2017-10-17 LAB
ALBUMIN SERPL BCP-MCNC: 3.5 G/DL
ALP SERPL-CCNC: 65 U/L
ALT SERPL W/O P-5'-P-CCNC: 21 U/L
ANION GAP SERPL CALC-SCNC: 7 MMOL/L
AST SERPL-CCNC: 17 U/L
BILIRUB SERPL-MCNC: 0.5 MG/DL
BUN SERPL-MCNC: 15 MG/DL
CALCIUM SERPL-MCNC: 9.5 MG/DL
CHLORIDE SERPL-SCNC: 101 MMOL/L
CHOLEST SERPL-MCNC: 196 MG/DL
CHOLEST/HDLC SERPL: 4.2 {RATIO}
CO2 SERPL-SCNC: 31 MMOL/L
CREAT SERPL-MCNC: 0.8 MG/DL
EST. GFR  (AFRICAN AMERICAN): >60 ML/MIN/1.73 M^2
EST. GFR  (NON AFRICAN AMERICAN): >60 ML/MIN/1.73 M^2
GLUCOSE SERPL-MCNC: 78 MG/DL
HDLC SERPL-MCNC: 47 MG/DL
HDLC SERPL: 24 %
LDLC SERPL CALC-MCNC: 113.8 MG/DL
NONHDLC SERPL-MCNC: 149 MG/DL
POTASSIUM SERPL-SCNC: 4 MMOL/L
PROT SERPL-MCNC: 7.2 G/DL
SODIUM SERPL-SCNC: 139 MMOL/L
TRIGL SERPL-MCNC: 176 MG/DL
TSH SERPL DL<=0.005 MIU/L-ACNC: 2.01 UIU/ML

## 2017-10-17 PROCEDURE — 99211 OFF/OP EST MAY X REQ PHY/QHP: CPT | Mod: PBBFAC

## 2017-10-17 PROCEDURE — 99999 PR PBB SHADOW E&M-EST. PATIENT-LVL I: CPT | Mod: PBBFAC,,,

## 2017-10-17 PROCEDURE — 36415 COLL VENOUS BLD VENIPUNCTURE: CPT | Mod: PBBFAC

## 2017-10-17 PROCEDURE — 80061 LIPID PANEL: CPT

## 2017-10-17 PROCEDURE — 83036 HEMOGLOBIN GLYCOSYLATED A1C: CPT

## 2017-10-17 PROCEDURE — 84443 ASSAY THYROID STIM HORMONE: CPT

## 2017-10-17 PROCEDURE — 80053 COMPREHEN METABOLIC PANEL: CPT

## 2017-10-18 LAB
ESTIMATED AVG GLUCOSE: 91 MG/DL
HBA1C MFR BLD HPLC: 4.8 %

## 2017-10-19 ENCOUNTER — TELEPHONE (OUTPATIENT)
Dept: FAMILY MEDICINE | Facility: CLINIC | Age: 52
End: 2017-10-19

## 2017-10-19 NOTE — TELEPHONE ENCOUNTER
----- Message from Roma Lawler MA sent at 10/19/2017  1:40 PM CDT -----  Contact: self  Jessi Linton  MRN: [id number]  : 1965  PCP: David Allred  Home Phone      [phone]  Work Phone      Not on file.  Mobile          [phone]      MESSAGE: Needs to speak with nurse about CPAP supplies and lab work. Please call and advise.  Phone: [phone]

## 2017-10-24 ENCOUNTER — TELEPHONE (OUTPATIENT)
Dept: FAMILY MEDICINE | Facility: CLINIC | Age: 52
End: 2017-10-24

## 2017-10-24 DIAGNOSIS — Z74.09 MOBILITY IMPAIRED: Primary | ICD-10-CM

## 2017-10-24 NOTE — TELEPHONE ENCOUNTER
"Mother here requesting a rollator walker with brakes and a seat, bag-- 17" seat  Please send a rx to [facility]  She is going to [place], wants to use there.  "

## 2017-11-08 ENCOUNTER — TELEPHONE (OUTPATIENT)
Dept: FAMILY MEDICINE | Facility: CLINIC | Age: 52
End: 2017-11-08

## 2017-11-08 DIAGNOSIS — G47.33 OSA (OBSTRUCTIVE SLEEP APNEA): Primary | ICD-10-CM

## 2017-11-08 NOTE — TELEPHONE ENCOUNTER
----- Message from [place] sent at 2017 10:38 AM CST -----  Contact: lalitha / [facility]  Jessi Linton  MRN: [id number]  : 1965  PCP: David Allred  Home Phone      [phone]  Work Phone      Not on file.  Mobile          [phone]      MESSAGE: was speaking with analy last week regardign this patient, would like to speak to him about her cpap, says they close from  for lunch just so he knows.    Phone: [phone]

## 2017-11-15 ENCOUNTER — TELEPHONE (OUTPATIENT)
Dept: FAMILY MEDICINE | Facility: CLINIC | Age: 52
End: 2017-11-15

## 2017-11-15 NOTE — TELEPHONE ENCOUNTER
----- Message from Ephraim Cowart sent at 11/15/2017  8:22 AM CST -----  Contact: patient  Jessi Linton  MRN: [id number]  : 1965  PCP: David Allred  Home Phone      [phone]  Work Phone      Not on file.  [place]          [phone]      MESSAGE: requesting handicap license paperwork    Call [phone] or [phone]    PCP: Brigida

## 2017-12-05 DIAGNOSIS — R05.9 COUGH: ICD-10-CM

## 2017-12-05 DIAGNOSIS — J45.41 MODERATE PERSISTENT ASTHMA WITH ACUTE EXACERBATION: ICD-10-CM

## 2017-12-05 RX ORDER — ALBUTEROL SULFATE 0.83 MG/ML
SOLUTION RESPIRATORY (INHALATION)
Qty: 375 ML | Refills: 1 | Status: SHIPPED | OUTPATIENT
Start: 2017-12-05

## 2017-12-05 RX ORDER — ALBUTEROL SULFATE 90 UG/1
2 AEROSOL, METERED RESPIRATORY (INHALATION) EVERY 6 HOURS PRN
Qty: 18 INHALER | Refills: 5 | Status: SHIPPED | OUTPATIENT
Start: 2017-12-05 | End: 2018-02-17 | Stop reason: SDUPTHER
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
## 2017-12-07 ENCOUNTER — TELEPHONE (OUTPATIENT)
Dept: FAMILY MEDICINE | Facility: CLINIC | Age: 52
End: 2017-12-07

## 2017-12-07 NOTE — TELEPHONE ENCOUNTER
----- Message from Ephraim Cowart sent at 2017  8:45 AM CST -----  Contact: Patient  Jessi Linton  MRN: [id number]  : 1965  PCP: David Allred  Home Phone      [phone]  Work Phone      Not on file.  Mobile          [phone]      MESSAGE: trouble breathing last night -- requesting appt today     Call mom (Kassandra) @ [phone]    PCP: Brigida

## 2017-12-07 NOTE — TELEPHONE ENCOUNTER
appt made with Dr. Squires tomorrow at 8:15am, pt mother verbalized understanding, will call today and cancel if needed
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
## 2017-12-08 ENCOUNTER — OFFICE VISIT (OUTPATIENT)
Dept: FAMILY MEDICINE | Facility: CLINIC | Age: 52
End: 2017-12-08
Payer: MEDICAID

## 2017-12-08 VITALS
HEART RATE: 62 BPM | BODY MASS INDEX: 44.85 KG/M2 | WEIGHT: 193.81 LBS | RESPIRATION RATE: 20 BRPM | DIASTOLIC BLOOD PRESSURE: 68 MMHG | HEIGHT: 55 IN | SYSTOLIC BLOOD PRESSURE: 130 MMHG | OXYGEN SATURATION: 95 %

## 2017-12-08 DIAGNOSIS — E11.9 DIABETES MELLITUS WITHOUT COMPLICATION: Primary | ICD-10-CM

## 2017-12-08 DIAGNOSIS — E11.9 TYPE 2 DIABETES MELLITUS WITHOUT COMPLICATION, WITHOUT LONG-TERM CURRENT USE OF INSULIN: ICD-10-CM

## 2017-12-08 DIAGNOSIS — J20.9 COPD WITH ACUTE BRONCHITIS: Primary | ICD-10-CM

## 2017-12-08 DIAGNOSIS — R35.0 INCREASED FREQUENCY OF URINATION: ICD-10-CM

## 2017-12-08 DIAGNOSIS — J44.0 COPD WITH ACUTE BRONCHITIS: Primary | ICD-10-CM

## 2017-12-08 DIAGNOSIS — G47.33 OSA (OBSTRUCTIVE SLEEP APNEA): ICD-10-CM

## 2017-12-08 PROCEDURE — 99213 OFFICE O/P EST LOW 20 MIN: CPT | Mod: S$PBB,,, | Performed by: FAMILY MEDICINE

## 2017-12-08 PROCEDURE — 99213 OFFICE O/P EST LOW 20 MIN: CPT | Mod: PBBFAC | Performed by: FAMILY MEDICINE

## 2017-12-08 PROCEDURE — 99999 PR PBB SHADOW E&M-EST. PATIENT-LVL III: CPT | Mod: PBBFAC,,, | Performed by: FAMILY MEDICINE

## 2017-12-08 RX ORDER — OXYBUTYNIN CHLORIDE 5 MG/1
5 TABLET, EXTENDED RELEASE ORAL DAILY
Qty: 30 TABLET | Refills: 11 | Status: SHIPPED | OUTPATIENT
Start: 2017-12-08 | End: 2018-01-24

## 2017-12-08 RX ORDER — DEXTROSE 4 G
1 TABLET,CHEWABLE ORAL 3 TIMES DAILY
Qty: 1 EACH | Refills: 1 | Status: SHIPPED | OUTPATIENT
Start: 2017-12-08 | End: 2022-04-11 | Stop reason: SDUPTHER

## 2017-12-08 RX ORDER — LANCETS
EACH MISCELLANEOUS
Qty: 100 EACH | Refills: 1 | Status: SHIPPED | OUTPATIENT
Start: 2017-12-08 | End: 2022-04-11 | Stop reason: SDUPTHER

## 2017-12-08 NOTE — PROGRESS NOTES
Subjective:       Patient ID: Jessi Linton is a 52 y.o. female.    Chief Complaint: Breathing Problem (having trouble breaking / 4 days) and Toe Injury (blister red and purple)    Pt is a 52 y.o. female who presents for check up for Copd with acute bronchitis & MAME  (primary encounter diagnosis). Doing well on current meds. Denies any side effects. Prevention is up to date.      Breathing Problem   She complains of difficulty breathing and shortness of breath. There is no cough or wheezing. Pertinent negatives include no appetite change, chest pain, fever, headaches, rhinorrhea, sore throat or trouble swallowing.   Toe Injury   Pertinent negatives include no abdominal pain, chest pain, chills, coughing, fever, headaches, joint swelling, nausea, numbness, rash, sore throat, vomiting or weakness. Neck pain: R great toe blister.     Review of Systems   Constitutional: Negative for appetite change, chills and fever.   HENT: Negative for rhinorrhea, sinus pressure, sore throat and trouble swallowing.    Respiratory: Positive for shortness of breath. Negative for cough, chest tightness and wheezing.    Cardiovascular: Negative for chest pain and palpitations.   Gastrointestinal: Negative for abdominal pain, blood in stool, diarrhea, nausea and vomiting.   Genitourinary: Positive for pelvic pain. Negative for dysuria, flank pain, hematuria, urgency, vaginal bleeding, vaginal discharge and vaginal pain.        Bladder pain   Musculoskeletal: Negative for back pain, joint swelling and neck stiffness. Neck pain: R great toe blister.   Skin: Negative for rash.   Neurological: Negative for dizziness, weakness, light-headedness, numbness and headaches.   Hematological: Does not bruise/bleed easily.   Psychiatric/Behavioral: Negative for agitation. The patient is not nervous/anxious.        Objective:      Physical Exam   Constitutional: She is oriented to person, place, and time.   53 y/o W F with mental delay   HENT:    Head: Normocephalic.   Eyes: Pupils are equal, round, and reactive to light.   Neck: Normal range of motion. Neck supple. No thyromegaly present.   Cardiovascular: Normal rate and regular rhythm.    Pulmonary/Chest: No respiratory distress. She has no wheezes. She has no rales. She exhibits no tenderness.   Abdominal: She exhibits no distension. There is no tenderness. There is no rebound and no guarding.   Musculoskeletal: Normal range of motion. She exhibits no edema or tenderness.   Lymphadenopathy:     She has no cervical adenopathy.   Neurological: She is alert and oriented to person, place, and time. She has normal reflexes. She displays normal reflexes. No cranial nerve deficit. She exhibits normal muscle tone. Coordination normal.   Skin: Skin is warm and dry. No rash noted. No pallor.   R great toe w a healing skin abrasion   Psychiatric: She has a normal mood and affect. Judgment and thought content normal.       Assessment:       1. COPD with acute bronchitis    2. MAME (obstructive sleep apnea)        Plan:   Jessi Conway was seen today for breathing problem and toe injury.    Diagnoses and all orders for this visit:    COPD with acute bronchitis  -     NEBULIZER FOR HOME USE    MAME (obstructive sleep apnea)
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
## 2017-12-15 ENCOUNTER — TELEPHONE (OUTPATIENT)
Dept: FAMILY MEDICINE | Facility: CLINIC | Age: 52
End: 2017-12-15

## 2017-12-15 DIAGNOSIS — G43.001 MIGRAINE WITHOUT AURA AND WITH STATUS MIGRAINOSUS, NOT INTRACTABLE: ICD-10-CM

## 2017-12-15 RX ORDER — SUMATRIPTAN 50 MG/1
TABLET, FILM COATED ORAL
Qty: 10 TABLET | Refills: 5 | Status: SHIPPED | OUTPATIENT
Start: 2017-12-15 | End: 2018-01-24

## 2017-12-30 DIAGNOSIS — E89.40 SURGICAL MENOPAUSE: ICD-10-CM

## 2018-01-01 RX ORDER — MONTELUKAST SODIUM 10 MG/1
TABLET ORAL
Qty: 30 TABLET | Refills: 5 | Status: SHIPPED | OUTPATIENT
Start: 2018-01-01 | End: 2018-06-25 | Stop reason: SDUPTHER

## 2018-01-01 RX ORDER — ESTROGENS, CONJUGATED 0.62 MG/1
0.62 TABLET, FILM COATED ORAL DAILY
Qty: 30 TABLET | Refills: 5 | Status: SHIPPED | OUTPATIENT
Start: 2018-01-01 | End: 2018-06-25 | Stop reason: SDUPTHER

## 2018-01-17 ENCOUNTER — OFFICE VISIT (OUTPATIENT)
Dept: FAMILY MEDICINE | Facility: CLINIC | Age: 53
End: 2018-01-17
Payer: MEDICAID

## 2018-01-17 VITALS
TEMPERATURE: 103 F | BODY MASS INDEX: 47.65 KG/M2 | SYSTOLIC BLOOD PRESSURE: 118 MMHG | HEART RATE: 84 BPM | WEIGHT: 205 LBS | DIASTOLIC BLOOD PRESSURE: 72 MMHG

## 2018-01-17 DIAGNOSIS — R50.9 FEVER, UNSPECIFIED FEVER CAUSE: Primary | ICD-10-CM

## 2018-01-17 DIAGNOSIS — J10.1 INFLUENZA B: ICD-10-CM

## 2018-01-17 DIAGNOSIS — R11.0 NAUSEA: ICD-10-CM

## 2018-01-17 DIAGNOSIS — J10.1 INFLUENZA A: ICD-10-CM

## 2018-01-17 PROCEDURE — 99214 OFFICE O/P EST MOD 30 MIN: CPT | Mod: PBBFAC | Performed by: NURSE PRACTITIONER

## 2018-01-17 PROCEDURE — 99999 PR PBB SHADOW E&M-EST. PATIENT-LVL IV: CPT | Mod: PBBFAC,,, | Performed by: NURSE PRACTITIONER

## 2018-01-17 PROCEDURE — 99213 OFFICE O/P EST LOW 20 MIN: CPT | Mod: S$PBB,,, | Performed by: NURSE PRACTITIONER

## 2018-01-17 RX ORDER — ONDANSETRON 2 MG/ML
4 INJECTION INTRAMUSCULAR; INTRAVENOUS
Status: COMPLETED | OUTPATIENT
Start: 2018-01-17 | End: 2018-01-17

## 2018-01-17 RX ORDER — OSELTAMIVIR PHOSPHATE 75 MG/1
75 CAPSULE ORAL 2 TIMES DAILY
Qty: 10 CAPSULE | Refills: 0 | Status: SHIPPED | OUTPATIENT
Start: 2018-01-17 | End: 2018-01-22

## 2018-01-17 RX ORDER — ONDANSETRON 4 MG/1
4 TABLET, FILM COATED ORAL EVERY 8 HOURS PRN
Qty: 60 TABLET | Refills: 2 | Status: SHIPPED | OUTPATIENT
Start: 2018-01-17 | End: 2019-01-17

## 2018-01-17 RX ADMIN — ONDANSETRON 4 MG: 2 INJECTION INTRAMUSCULAR; INTRAVENOUS at 04:01

## 2018-01-17 NOTE — PATIENT INSTRUCTIONS

## 2018-01-17 NOTE — PROGRESS NOTES
Subjective:       Patient ID: Jessi Linton is a 52 y.o. female.    Chief Complaint: Fever    Fever    The current episode started today. The maximum temperature noted was 103 to 103.9 F. Associated symptoms include congestion, coughing, nausea, a sore throat and vomiting (x 3). Pertinent negatives include no abdominal pain, diarrhea, ear pain, headaches or wheezing.     Review of Systems   Constitutional: Positive for appetite change, chills, fatigue and fever.   HENT: Positive for congestion, rhinorrhea and sore throat. Negative for ear pain.    Eyes: Negative.  Negative for visual disturbance.   Respiratory: Positive for cough. Negative for shortness of breath and wheezing.    Cardiovascular: Negative.    Gastrointestinal: Positive for nausea and vomiting (x 3). Negative for abdominal pain and diarrhea.   Endocrine: Negative.    Genitourinary: Negative.  Negative for difficulty urinating and urgency.   Musculoskeletal: Negative.  Negative for arthralgias and myalgias.   Skin: Negative.  Negative for color change.   Allergic/Immunologic: Negative.    Neurological: Negative.  Negative for dizziness and headaches.   Hematological: Negative.    Psychiatric/Behavioral: Negative.  Negative for sleep disturbance.   All other systems reviewed and are negative.      Objective:      Physical Exam   Constitutional: She is oriented to person, place, and time. She appears well-developed and well-nourished.   HENT:   Head: Normocephalic and atraumatic.   Right Ear: External ear normal. A middle ear effusion is present.   Left Ear: External ear normal. A middle ear effusion is present.   Nose: Mucosal edema and rhinorrhea present.   Mouth/Throat: Uvula is midline, oropharynx is clear and moist and mucous membranes are normal.   Eyes: Conjunctivae are normal. Pupils are equal, round, and reactive to light.   Neck: Trachea normal and normal range of motion. Neck supple. No thyromegaly present.   Cardiovascular: Normal rate,  regular rhythm, S1 normal, S2 normal and normal heart sounds.    No murmur heard.  Pulmonary/Chest: Effort normal and breath sounds normal. No respiratory distress.   Abdominal: Soft. Normal appearance. There is no tenderness.   Musculoskeletal: Normal range of motion.   Lymphadenopathy:     She has no cervical adenopathy.   Neurological: She is alert and oriented to person, place, and time.   Skin: Skin is warm, dry and intact.   Psychiatric: She has a normal mood and affect. Her speech is normal.   Nursing note and vitals reviewed.      Assessment:       1. Fever, unspecified fever cause    2. Influenza A    3. Influenza B    4. Nausea        Plan:   Jessi Conway was seen today for fever.    Diagnoses and all orders for this visit:    Fever, unspecified fever cause  -     POCT Influenza A/B - both positive    Influenza A  -     oseltamivir (TAMIFLU) 75 MG capsule; Take 1 capsule (75 mg total) by mouth 2 (two) times daily.    Influenza B  -     oseltamivir (TAMIFLU) 75 MG capsule; Take 1 capsule (75 mg total) by mouth 2 (two) times daily.    Nausea  -     ondansetron (ZOFRAN) 4 MG tablet; Take 1 tablet (4 mg total) by mouth every 8 (eight) hours as needed for Nausea.  -     ondansetron injection 4 mg; Inject 4 mg into the muscle one time.    RTC PRN

## 2018-01-19 ENCOUNTER — TELEPHONE (OUTPATIENT)
Dept: FAMILY MEDICINE | Facility: CLINIC | Age: 53
End: 2018-01-19

## 2018-01-19 DIAGNOSIS — R05.9 COUGH: Primary | ICD-10-CM

## 2018-01-19 RX ORDER — PROMETHAZINE HYDROCHLORIDE AND CODEINE PHOSPHATE 6.25; 1 MG/5ML; MG/5ML
5 SOLUTION ORAL EVERY 4 HOURS PRN
Qty: 180 ML | Refills: 0 | Status: SHIPPED | OUTPATIENT
Start: 2018-01-19 | End: 2018-01-29

## 2018-01-19 NOTE — TELEPHONE ENCOUNTER
Spoke to pt and she was seen by you on 1/17/2018 and was dx with FLU.  Given tamiflu for treatment. Pt stated she is now having fever off and on and her cough is getting worse and some times coughing up yellow mucus.  Mother got on the phone and was demanding that she speak with CINDY now so she can call in cough medication with codeine. Informed mother that Mrs lucas was in clinic and we can not interrupt her to call in medication, but I can send her a message for when she is done.   Mother stated she wants medication called in today and would like to get a call before everyone leaves with the name of the medication that was sent.  Told mother I will see what I can do

## 2018-01-19 NOTE — TELEPHONE ENCOUNTER
----- Message from Shea Davis sent at 2018  8:48 AM CST -----  Contact: Self  Jessi Linton  MRN: [id number]  : 1965  PCP: David Allred  Home Phone      [phone]  Work Phone      Not on file.  Mobile          [phone]    MESSAGE:   Requesting cough med with codeine.  Was seen a few days ago and diagnosed with Flu.      Pharmacy:  [facility] in [place]    Phone: [phone]

## 2018-01-19 NOTE — TELEPHONE ENCOUNTER
----- Message from Christine Caro MA sent at 2018  2:20 PM CST -----  Contact: Patient   Jessi Linton  MRN: [id number]  : 1965  PCP: David Allred  Home Phone      [phone]  Work Phone      Not on file.  Mobile          [phone]      MESSAGE:   Pt states she has left a prior message for nurse to return her call as she is coughing up yellow mucus and would like to speak to a nurse about this since she just saw Foret on Wednesday about this.

## 2018-01-20 NOTE — TELEPHONE ENCOUNTER
Mother/Kassandra calling--Patient tested positive for the flu. She is still very sick with high fever and ear pain. Wants to know if something can be called in to help.   TOÑO--Mayra sent in phensarmadan with codeine yesterday

## 2018-01-20 NOTE — TELEPHONE ENCOUNTER
----- Message from Roma Lawler MA sent at 2018 10:15 AM CST -----  Contact: Mother  Jessi Linton  MRN: [id number]  : 1965  PCP: David Allred  Home Phone      [phone]  Work Phone      Not on file.  Mobile          [phone]      MESSAGE: Patient tested positive for the flu. She is still very sick with high fever and ear pain. Wants to know if something can be called in to help. Please call and advise.  Phone: [phone]  Pharmacy: [facility] [place] [facility]

## 2018-01-21 ENCOUNTER — HOSPITAL ENCOUNTER (EMERGENCY)
Facility: HOSPITAL | Age: 53
Discharge: HOME | End: 2018-01-21
Attending: EMERGENCY MEDICINE
Payer: MEDICAID

## 2018-01-21 VITALS
TEMPERATURE: 100 F | SYSTOLIC BLOOD PRESSURE: 131 MMHG | WEIGHT: 205 LBS | OXYGEN SATURATION: 95 % | HEART RATE: 81 BPM | BODY MASS INDEX: 47.65 KG/M2 | RESPIRATION RATE: 18 BRPM | DIASTOLIC BLOOD PRESSURE: 74 MMHG

## 2018-01-21 DIAGNOSIS — J02.9 PHARYNGITIS, UNSPECIFIED ETIOLOGY: Primary | ICD-10-CM

## 2018-01-21 PROCEDURE — 96372 THER/PROPH/DIAG INJ SC/IM: CPT

## 2018-01-21 PROCEDURE — 63600175 PHARM REV CODE 636 W HCPCS: Performed by: EMERGENCY MEDICINE

## 2018-01-21 PROCEDURE — 99283 EMERGENCY DEPT VISIT LOW MDM: CPT | Mod: 25

## 2018-01-21 RX ORDER — METHYLPREDNISOLONE SOD SUCC 125 MG
125 VIAL (EA) INJECTION
Status: COMPLETED | OUTPATIENT
Start: 2018-01-21 | End: 2018-01-21

## 2018-01-21 RX ADMIN — METHYLPREDNISOLONE SODIUM SUCCINATE 125 MG: 125 INJECTION, POWDER, FOR SOLUTION INTRAMUSCULAR; INTRAVENOUS at 08:01

## 2018-01-22 NOTE — ED PROVIDER NOTES
Encounter Date: 1/21/2018       History     Chief Complaint   Patient presents with    Sore Throat    Otalgia    Cough     The history is provided by the patient.   Sore Throat   This is a new problem. The current episode started yesterday. The problem has not changed since onset.Pertinent negatives include no chest pain, no abdominal pain, no headaches and no shortness of breath. The symptoms are aggravated by swallowing. Nothing relieves the symptoms. She has tried nothing for the symptoms.     Review of patient's allergies indicates:  No Known Allergies  Past Medical History:   Diagnosis Date    Asthma     COPD with acute bronchitis 12/8/2017    Diabetes mellitus     Pre-diabetes (monitors sugars 3 x week)    GERD (gastroesophageal reflux disease)     Hiatal hernia     with reflux    Kidney stone     Migraine headache     Screen for colon cancer     Sleep apnea      Past Surgical History:   Procedure Laterality Date    HYSTERECTOMY      MILA- BSO    OOPHORECTOMY       Family History   Problem Relation Age of Onset    Glaucoma Mother     Cancer Father     Heart disease Maternal Grandmother     Heart disease Maternal Grandfather     Diabetes Maternal Grandfather      Social History   Substance Use Topics    Smoking status: Never Smoker    Smokeless tobacco: Never Used    Alcohol use No     Review of Systems   Constitutional: Negative for fever.   HENT: Positive for ear pain and sore throat.    Eyes: Negative for pain, discharge, redness and itching.   Respiratory: Negative for shortness of breath, wheezing and stridor.    Cardiovascular: Negative for chest pain, palpitations and leg swelling.   Gastrointestinal: Negative for abdominal pain, diarrhea, nausea and vomiting.   Genitourinary: Negative for enuresis and flank pain.   Musculoskeletal: Negative for back pain, gait problem, joint swelling, neck pain and neck stiffness.   Skin: Negative for color change, pallor, rash and wound.    Neurological: Negative for tremors, syncope, weakness and headaches.       Physical Exam     Initial Vitals [01/21/18 2014]   BP Pulse Resp Temp SpO2   131/74 81 18 99.8 °F (37.7 °C) 95 %      MAP       93         Physical Exam    Nursing note and vitals reviewed.  Constitutional: She appears well-developed and well-nourished. She is not diaphoretic. No distress.   HENT:   Head: Normocephalic and atraumatic.   Mouth/Throat: No oropharyngeal exudate.   Eyes: EOM are normal. Pupils are equal, round, and reactive to light.   Neck: Normal range of motion. Neck supple. No JVD present.   Pulmonary/Chest: Breath sounds normal. No stridor. No respiratory distress. She has no wheezes. She has no rhonchi. She has no rales.   Abdominal: Soft. Bowel sounds are normal. She exhibits no distension. There is no tenderness. There is no rebound and no guarding.   Musculoskeletal: Normal range of motion. She exhibits no edema or tenderness.   Neurological: She is alert and oriented to person, place, and time. No sensory deficit.   Skin: No rash noted.         ED Course   Procedures  Labs Reviewed - No data to display                            ED Course      Clinical Impression:   The encounter diagnosis was Pharyngitis, unspecified etiology.    Disposition:   Disposition: Discharged  Condition: Stable                        Steffen Argueta MD  01/21/18 2036

## 2018-01-23 DIAGNOSIS — B35.3 TINEA PEDIS OF RIGHT FOOT: ICD-10-CM

## 2018-01-23 RX ORDER — CLOTRIMAZOLE 1 %
CREAM (GRAM) TOPICAL
Qty: 15 G | Refills: 0 | Status: SHIPPED | OUTPATIENT
Start: 2018-01-23

## 2018-01-24 ENCOUNTER — OFFICE VISIT (OUTPATIENT)
Dept: FAMILY MEDICINE | Facility: CLINIC | Age: 53
End: 2018-01-24
Payer: MEDICAID

## 2018-01-24 VITALS
BODY MASS INDEX: 47.7 KG/M2 | TEMPERATURE: 101 F | WEIGHT: 206.13 LBS | RESPIRATION RATE: 20 BRPM | HEART RATE: 100 BPM | SYSTOLIC BLOOD PRESSURE: 136 MMHG | DIASTOLIC BLOOD PRESSURE: 86 MMHG | HEIGHT: 55 IN

## 2018-01-24 DIAGNOSIS — J11.1 INFLUENZA: Primary | ICD-10-CM

## 2018-01-24 DIAGNOSIS — R05.9 COUGH: ICD-10-CM

## 2018-01-24 PROCEDURE — 99213 OFFICE O/P EST LOW 20 MIN: CPT | Mod: S$PBB,,, | Performed by: FAMILY MEDICINE

## 2018-01-24 PROCEDURE — 99214 OFFICE O/P EST MOD 30 MIN: CPT | Mod: PBBFAC | Performed by: FAMILY MEDICINE

## 2018-01-24 PROCEDURE — 99999 PR PBB SHADOW E&M-EST. PATIENT-LVL IV: CPT | Mod: PBBFAC,,, | Performed by: FAMILY MEDICINE

## 2018-01-24 RX ORDER — BENZONATATE 200 MG/1
200 CAPSULE ORAL 3 TIMES DAILY PRN
Qty: 30 CAPSULE | Refills: 1 | Status: SHIPPED | OUTPATIENT
Start: 2018-01-24 | End: 2018-02-03

## 2018-01-24 RX ORDER — FLUTICASONE PROPIONATE 50 MCG
2 SPRAY, SUSPENSION (ML) NASAL DAILY
Qty: 1 BOTTLE | Refills: 5 | Status: SHIPPED | OUTPATIENT
Start: 2018-01-24 | End: 2018-02-23

## 2018-01-24 NOTE — PROGRESS NOTES
Subjective:       Patient ID: Jessi Linton is a 52 y.o. female.    Chief Complaint: Follow-up (ER follow up; went to ER sunday for cough )    Pt is a 52 y.o. female who presents for evaluation and management of   Encounter Diagnosis   Name Primary?    Influenza Yes   .dx last wed in ED with A and B   rx tamiflu  Still coughing   Still with fever     Doing well on current meds. Denies any side effects. Prevention is up to date.    Review of Systems   Constitutional: Positive for chills and fever.   HENT: Positive for congestion and ear pain.    Respiratory: Positive for cough.        Objective:      Physical Exam   Constitutional: She is oriented to person, place, and time. She appears well-developed and well-nourished.   HENT:   Head: Normocephalic and atraumatic.   Right Ear: External ear normal.   Left Ear: External ear normal.   Nose: Nose normal.   Mouth/Throat: Oropharynx is clear and moist.   Eyes: EOM are normal. Pupils are equal, round, and reactive to light.   Neck: Normal range of motion. Neck supple. No JVD present. No tracheal deviation present. No thyromegaly present.   Cardiovascular: Normal rate, normal heart sounds and intact distal pulses.    No murmur heard.  Pulmonary/Chest: Effort normal and breath sounds normal. No respiratory distress. She has no wheezes. She has no rales. She exhibits no tenderness.   Abdominal: Soft. Bowel sounds are normal. She exhibits no distension and no mass. There is no tenderness. There is no rebound and no guarding.   Musculoskeletal: Normal range of motion. She exhibits no edema or tenderness.   Lymphadenopathy:     She has no cervical adenopathy.   Neurological: She is alert and oriented to person, place, and time. She has normal reflexes. She displays normal reflexes. No cranial nerve deficit. She exhibits normal muscle tone. Coordination normal.   Skin: Skin is warm and dry. No rash noted. No erythema. No pallor.   Psychiatric: She has a normal mood and  affect. Her behavior is normal. Judgment and thought content normal.       Assessment:       1. Influenza        Plan:   Jessi Conway was seen today for follow-up.    Diagnoses and all orders for this visit:    Influenza    Cough  -     benzonatate (TESSALON) 200 MG capsule; Take 1 capsule (200 mg total) by mouth 3 (three) times daily as needed for Cough.    Other orders  -     fluticasone (FLONASE) 50 mcg/actuation nasal spray; 2 sprays (100 mcg total) by Each Nare route once daily.      No Follow-up on file.

## 2018-02-16 ENCOUNTER — TELEPHONE (OUTPATIENT)
Dept: FAMILY MEDICINE | Facility: CLINIC | Age: 53
End: 2018-02-16

## 2018-02-16 NOTE — TELEPHONE ENCOUNTER
----- Message from Ephraim Cowart sent at 2018  2:29 PM CST -----  Contact: Patient  Jessi Linton  MRN: [id number]  : 1965  PCP: David Allred  Home Phone      [phone]  Work Phone      Not on file.  Mobile          [phone]      MESSAGE: fever - requesting appt today    Call [phone] ring room [phone]    PCP: Brigida

## 2018-02-16 NOTE — TELEPHONE ENCOUNTER
----- Message from Kisha Richards sent at 2018  2:58 PM CST -----  Contact: daughter  Jessi Linton  MRN: [id number]  : 1965  PCP: David Allred  Home Phone      [phone]  Work Phone      Not on file.  Mobile          [phone]      MESSAGE:   Patient had the flu  and is now running fever- 101.5. Afraid it is the flu again and would like to be seen. Please advise    Kassandra [phone]

## 2018-02-16 NOTE — TELEPHONE ENCOUNTER
Contacted pt mother advised pt to go to the ER. Verbalized understanding and will be going shortly.

## 2018-02-17 ENCOUNTER — OFFICE VISIT (OUTPATIENT)
Dept: FAMILY MEDICINE | Facility: CLINIC | Age: 53
End: 2018-02-17
Payer: MEDICAID

## 2018-02-17 VITALS
RESPIRATION RATE: 20 BRPM | DIASTOLIC BLOOD PRESSURE: 80 MMHG | HEIGHT: 55 IN | HEART RATE: 86 BPM | WEIGHT: 199.81 LBS | OXYGEN SATURATION: 99 % | TEMPERATURE: 99 F | SYSTOLIC BLOOD PRESSURE: 128 MMHG | BODY MASS INDEX: 46.24 KG/M2

## 2018-02-17 DIAGNOSIS — N32.81 OAB (OVERACTIVE BLADDER): ICD-10-CM

## 2018-02-17 DIAGNOSIS — R05.9 COUGH: ICD-10-CM

## 2018-02-17 DIAGNOSIS — J45.41 MODERATE PERSISTENT ASTHMA WITH ACUTE EXACERBATION: ICD-10-CM

## 2018-02-17 DIAGNOSIS — J32.9 SINUSITIS, UNSPECIFIED CHRONICITY, UNSPECIFIED LOCATION: Primary | ICD-10-CM

## 2018-02-17 PROCEDURE — 3008F BODY MASS INDEX DOCD: CPT | Mod: ,,, | Performed by: NURSE PRACTITIONER

## 2018-02-17 PROCEDURE — 99213 OFFICE O/P EST LOW 20 MIN: CPT | Mod: 25,S$PBB,, | Performed by: NURSE PRACTITIONER

## 2018-02-17 PROCEDURE — 99999 PR PBB SHADOW E&M-EST. PATIENT-LVL IV: CPT | Mod: PBBFAC,,, | Performed by: NURSE PRACTITIONER

## 2018-02-17 PROCEDURE — 99214 OFFICE O/P EST MOD 30 MIN: CPT | Mod: PBBFAC | Performed by: NURSE PRACTITIONER

## 2018-02-17 RX ORDER — CEPHALEXIN 500 MG/1
500 CAPSULE ORAL EVERY 12 HOURS
Qty: 20 CAPSULE | Refills: 0 | Status: SHIPPED | OUTPATIENT
Start: 2018-02-17 | End: 2018-02-27

## 2018-02-17 RX ORDER — BENZONATATE 100 MG/1
100 CAPSULE ORAL 3 TIMES DAILY PRN
Qty: 30 CAPSULE | Refills: 1 | Status: SHIPPED | OUTPATIENT
Start: 2018-02-17 | End: 2019-11-06 | Stop reason: SDUPTHER

## 2018-02-17 RX ORDER — ALBUTEROL SULFATE 90 UG/1
2 AEROSOL, METERED RESPIRATORY (INHALATION) EVERY 6 HOURS PRN
Qty: 18 INHALER | Refills: 5 | Status: SHIPPED | OUTPATIENT
Start: 2018-02-17 | End: 2020-02-10

## 2018-02-17 RX ORDER — OXYBUTYNIN CHLORIDE 5 MG/1
5 TABLET, EXTENDED RELEASE ORAL DAILY
COMMUNITY
End: 2018-02-17 | Stop reason: SDUPTHER

## 2018-02-17 RX ORDER — METHYLPREDNISOLONE ACETATE 40 MG/ML
60 INJECTION, SUSPENSION INTRA-ARTICULAR; INTRALESIONAL; INTRAMUSCULAR; SOFT TISSUE
Status: COMPLETED | OUTPATIENT
Start: 2018-02-17 | End: 2018-02-17

## 2018-02-17 RX ORDER — OXYBUTYNIN CHLORIDE 10 MG/1
10 TABLET, EXTENDED RELEASE ORAL DAILY
Qty: 30 TABLET | Refills: 2 | Status: SHIPPED | OUTPATIENT
Start: 2018-02-17 | End: 2018-03-26 | Stop reason: SDUPTHER

## 2018-02-17 RX ADMIN — METHYLPREDNISOLONE ACETATE 60 MG: 40 INJECTION, SUSPENSION INTRA-ARTICULAR; INTRALESIONAL; INTRAMUSCULAR; SOFT TISSUE at 10:02

## 2018-02-17 NOTE — PROGRESS NOTES
Subjective:       Patient ID: Jessi Linton is a 52 y.o. female.    Chief Complaint: Cough and Fever (101.5 yesterday)    Cough   The current episode started yesterday. The cough is productive of sputum. Associated symptoms include chills, a fever, nasal congestion and rhinorrhea. Pertinent negatives include no ear pain, headaches, myalgias, sore throat, shortness of breath or wheezing.   Fever    The current episode started yesterday. The maximum temperature noted was 101 to 101.9 F. Associated symptoms include congestion and coughing. Pertinent negatives include no abdominal pain, diarrhea, ear pain, headaches, nausea, sore throat, vomiting or wheezing.     Review of Systems   Constitutional: Positive for appetite change, chills, fatigue and fever.   HENT: Positive for congestion and rhinorrhea. Negative for ear pain and sore throat.    Eyes: Negative.  Negative for visual disturbance.   Respiratory: Positive for cough. Negative for shortness of breath and wheezing.    Cardiovascular: Negative.    Gastrointestinal: Negative.  Negative for abdominal pain, diarrhea, nausea and vomiting.   Endocrine: Negative.    Genitourinary: Negative.  Negative for difficulty urinating and urgency.   Musculoskeletal: Negative.  Negative for arthralgias and myalgias.   Skin: Negative.  Negative for color change.   Allergic/Immunologic: Negative.    Neurological: Negative.  Negative for dizziness and headaches.   Hematological: Negative.    Psychiatric/Behavioral: Negative.  Negative for sleep disturbance.   All other systems reviewed and are negative.      Objective:      Physical Exam   Constitutional: She appears well-developed and well-nourished.   HENT:   Head: Normocephalic and atraumatic.   Right Ear: External ear normal. A middle ear effusion is present.   Left Ear: External ear normal. A middle ear effusion is present.   Nose: Mucosal edema and rhinorrhea present.   Mouth/Throat: Uvula is midline, oropharynx is clear  and moist and mucous membranes are normal.   Eyes: Conjunctivae are normal.   Neck: Trachea normal and normal range of motion. Neck supple. No thyromegaly present.   Cardiovascular: Normal rate, regular rhythm, S1 normal, S2 normal and normal heart sounds.    No murmur heard.  Pulmonary/Chest: Effort normal and breath sounds normal. No respiratory distress.   Abdominal: Soft. Normal appearance.   Musculoskeletal: Normal range of motion.   Lymphadenopathy:     She has no cervical adenopathy.   Neurological: She is alert.   Skin: Skin is warm, dry and intact.   Psychiatric: She has a normal mood and affect. Her speech is normal.   Nursing note and vitals reviewed.      Assessment:       1. Sinusitis, unspecified chronicity, unspecified location    2. Cough    3. OAB (overactive bladder)    4. Moderate persistent asthma with acute exacerbation        Plan:     Jessi Conway was seen today for cough and fever.    Diagnoses and all orders for this visit:    Sinusitis, unspecified chronicity, unspecified location  -     methylPREDNISolone acetate injection 60 mg; Inject 1.5 mLs (60 mg total) into the muscle one time.  -     cephALEXin (KEFLEX) 500 MG capsule; Take 1 capsule (500 mg total) by mouth every 12 (twelve) hours.    Cough  -     benzonatate (TESSALON) 100 MG capsule; Take 1 capsule (100 mg total) by mouth 3 (three) times daily as needed for Cough.    OAB (overactive bladder)  -     oxybutynin (DITROPAN-XL) 10 MG 24 hr tablet; Take 1 tablet (10 mg total) by mouth once daily.    Moderate persistent asthma with acute exacerbation  -     albuterol (VENTOLIN HFA) 90 mcg/actuation inhaler; Inhale 2 puffs into the lungs every 6 (six) hours as needed for Wheezing.    RTC PRN

## 2018-03-04 RX ORDER — PROPRANOLOL HYDROCHLORIDE 20 MG/1
TABLET ORAL
Qty: 60 TABLET | Refills: 5 | Status: SHIPPED | OUTPATIENT
Start: 2018-03-04 | End: 2018-08-23 | Stop reason: SDUPTHER

## 2018-03-12 ENCOUNTER — TELEPHONE (OUTPATIENT)
Dept: FAMILY MEDICINE | Facility: CLINIC | Age: 53
End: 2018-03-12

## 2018-03-12 DIAGNOSIS — R53.81 DEBILITY: Primary | ICD-10-CM

## 2018-03-12 DIAGNOSIS — G47.33 OSA (OBSTRUCTIVE SLEEP APNEA): ICD-10-CM
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
## 2018-03-13 NOTE — TELEPHONE ENCOUNTER
Rollator walker prescription originally put in as medication, order not sufficient.    Patient needs walker for home use order. Advise

## 2018-03-13 NOTE — TELEPHONE ENCOUNTER
Information given to  nurse due to communication error for cpac /walker to be sent to [facility].
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
## 2018-03-21 ENCOUNTER — TELEPHONE (OUTPATIENT)
Dept: FAMILY MEDICINE | Facility: CLINIC | Age: 53
End: 2018-03-21

## 2018-03-21 DIAGNOSIS — J45.20 MILD INTERMITTENT REACTIVE AIRWAY DISEASE WITHOUT COMPLICATION: Primary | ICD-10-CM

## 2018-03-21 NOTE — TELEPHONE ENCOUNTER
----- Message from Kisha Richards sent at 3/21/2018 10:41 AM CDT -----  Contact: [facility]  Jessi Linton  MRN: [id number]  : 1965  PCP: David Allred  Home Phone      [phone]  Work Phone      Not on file.  Mobile          [phone]      MESSAGE:   Patient needs a prescription for a nebulizer- needs to have the reason why she needs it. Please advise.    [facility]  Fax  [phone]

## 2018-03-26 ENCOUNTER — OFFICE VISIT (OUTPATIENT)
Dept: FAMILY MEDICINE | Facility: CLINIC | Age: 53
End: 2018-03-26
Payer: MEDICAID

## 2018-03-26 VITALS
RESPIRATION RATE: 16 BRPM | WEIGHT: 207 LBS | SYSTOLIC BLOOD PRESSURE: 126 MMHG | HEIGHT: 55 IN | HEART RATE: 69 BPM | BODY MASS INDEX: 47.9 KG/M2 | DIASTOLIC BLOOD PRESSURE: 71 MMHG

## 2018-03-26 DIAGNOSIS — R42 DIZZINESS: ICD-10-CM

## 2018-03-26 DIAGNOSIS — R45.89 DEPRESSED MOOD: ICD-10-CM

## 2018-03-26 DIAGNOSIS — G47.33 OSA (OBSTRUCTIVE SLEEP APNEA): Primary | ICD-10-CM

## 2018-03-26 DIAGNOSIS — E66.01 CLASS 3 SEVERE OBESITY DUE TO EXCESS CALORIES WITHOUT SERIOUS COMORBIDITY WITH BODY MASS INDEX (BMI) OF 40.0 TO 44.9 IN ADULT: ICD-10-CM

## 2018-03-26 DIAGNOSIS — G43.009 MIGRAINE WITHOUT AURA AND WITHOUT STATUS MIGRAINOSUS, NOT INTRACTABLE: ICD-10-CM

## 2018-03-26 DIAGNOSIS — N32.81 OAB (OVERACTIVE BLADDER): ICD-10-CM

## 2018-03-26 DIAGNOSIS — K76.0 FATTY LIVER: ICD-10-CM

## 2018-03-26 DIAGNOSIS — H92.01 OTALGIA, RIGHT: ICD-10-CM

## 2018-03-26 PROBLEM — E11.9 TYPE 2 DIABETES MELLITUS: Status: RESOLVED | Noted: 2017-12-08 | Resolved: 2018-03-26

## 2018-03-26 PROCEDURE — 99213 OFFICE O/P EST LOW 20 MIN: CPT | Mod: PBBFAC | Performed by: FAMILY MEDICINE

## 2018-03-26 PROCEDURE — 99214 OFFICE O/P EST MOD 30 MIN: CPT | Mod: S$PBB,,, | Performed by: FAMILY MEDICINE

## 2018-03-26 PROCEDURE — 99999 PR PBB SHADOW E&M-EST. PATIENT-LVL III: CPT | Mod: PBBFAC,,, | Performed by: FAMILY MEDICINE

## 2018-03-26 RX ORDER — OXYBUTYNIN CHLORIDE 15 MG/1
15 TABLET, EXTENDED RELEASE ORAL DAILY
Qty: 30 TABLET | Refills: 5 | Status: SHIPPED | OUTPATIENT
Start: 2018-03-26 | End: 2018-09-12 | Stop reason: SDUPTHER

## 2018-03-26 RX ORDER — BUPROPION HYDROCHLORIDE 75 MG/1
75 TABLET ORAL 2 TIMES DAILY
Qty: 60 TABLET | Refills: 0 | Status: SHIPPED | OUTPATIENT
Start: 2018-03-26 | End: 2018-04-22 | Stop reason: SDUPTHER
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
## 2018-04-22 DIAGNOSIS — R45.89 DEPRESSED MOOD: ICD-10-CM

## 2018-04-22 RX ORDER — BUPROPION HYDROCHLORIDE 75 MG/1
75 TABLET ORAL 2 TIMES DAILY
Qty: 60 TABLET | Refills: 0 | Status: SHIPPED | OUTPATIENT
Start: 2018-04-22 | End: 2018-05-15 | Stop reason: SDUPTHER

## 2018-04-24 ENCOUNTER — OFFICE VISIT (OUTPATIENT)
Dept: FAMILY MEDICINE | Facility: CLINIC | Age: 53
End: 2018-04-24
Payer: MEDICAID

## 2018-04-24 VITALS
HEART RATE: 72 BPM | BODY MASS INDEX: 49.8 KG/M2 | RESPIRATION RATE: 20 BRPM | WEIGHT: 215.19 LBS | HEIGHT: 55 IN | DIASTOLIC BLOOD PRESSURE: 88 MMHG | SYSTOLIC BLOOD PRESSURE: 138 MMHG

## 2018-04-24 DIAGNOSIS — R45.89 DEPRESSED MOOD: ICD-10-CM

## 2018-04-24 DIAGNOSIS — N32.81 OVERACTIVE BLADDER: Primary | ICD-10-CM

## 2018-04-24 PROCEDURE — 99213 OFFICE O/P EST LOW 20 MIN: CPT | Mod: PBBFAC | Performed by: FAMILY MEDICINE

## 2018-04-24 PROCEDURE — 99999 PR PBB SHADOW E&M-EST. PATIENT-LVL III: CPT | Mod: PBBFAC,,, | Performed by: FAMILY MEDICINE

## 2018-04-24 PROCEDURE — 99214 OFFICE O/P EST MOD 30 MIN: CPT | Mod: S$PBB,,, | Performed by: FAMILY MEDICINE

## 2018-04-24 RX ORDER — OXYBUTYNIN CHLORIDE 5 MG/1
5 TABLET, EXTENDED RELEASE ORAL DAILY
Qty: 30 TABLET | Refills: 5 | Status: SHIPPED | OUTPATIENT
Start: 2018-04-24 | End: 2018-10-19 | Stop reason: SDUPTHER

## 2018-04-24 NOTE — PROGRESS NOTES
Subjective:       Patient ID: Jessi Linton is a 52 y.o. female.    Chief Complaint: Follow-up    Pt is a 52 y.o. female who presents for evaluation and management of   Encounter Diagnoses   Name Primary?    Overactive bladder Yes    Depressed mood    .only took one dose of her wellbutrin  She is afraid it will effect her bladder  She is having few random episodes of incontinence. No stress incontinence. It is random per patient. No urge beforehand. Luckily, she has only had 2 episodes in the last 2 weeks     Doing well on current meds. Denies any side effects. Prevention is up to date.  Review of Systems   Constitutional: Positive for fatigue. Negative for fever.   Psychiatric/Behavioral: Negative for suicidal ideas.       Objective:      Physical Exam   Constitutional: She is oriented to person, place, and time. She appears well-developed and well-nourished.   Morbidly obese    HENT:   Head: Normocephalic and atraumatic.   Right Ear: External ear normal.   Left Ear: External ear normal.   Nose: Nose normal.   Mouth/Throat: Oropharynx is clear and moist.   Eyes: EOM are normal. Pupils are equal, round, and reactive to light.   Neck: Normal range of motion. Neck supple. No JVD present. No tracheal deviation present. No thyromegaly present.   Cardiovascular: Normal rate, normal heart sounds and intact distal pulses.    No murmur heard.  Pulmonary/Chest: Effort normal and breath sounds normal. No respiratory distress. She has no wheezes. She has no rales. She exhibits no tenderness.   Abdominal: Soft. Bowel sounds are normal. She exhibits no distension and no mass. There is no tenderness. There is no rebound and no guarding.   Musculoskeletal: Normal range of motion. She exhibits no edema or tenderness.   Lymphadenopathy:     She has no cervical adenopathy.   Neurological: She is alert and oriented to person, place, and time. She has normal reflexes. She displays normal reflexes. No cranial nerve deficit. She  exhibits normal muscle tone. Coordination normal.   Skin: Skin is warm and dry. No rash noted. No erythema. No pallor.   Psychiatric: She has a normal mood and affect. Her behavior is normal. Judgment and thought content normal.       Assessment:       1. Overactive bladder    2. Depressed mood        Plan:   Jessi Conway was seen today for follow-up.    Diagnoses and all orders for this visit:    Overactive bladder  -     oxybutynin (DITROPAN-XL) 5 MG TR24; Take 1 tablet (5 mg total) by mouth once daily.    Depressed mood    She will resume her wellbutrin to help with mood, which I think will help her get back into her routine of exercising and eating right. She gained another 8# since last visit     Increasing her ditropan t o20mg daily     RTC 3 weeks       No Follow-up on file.

## 2018-05-15 ENCOUNTER — OFFICE VISIT (OUTPATIENT)
Dept: FAMILY MEDICINE | Facility: CLINIC | Age: 53
End: 2018-05-15
Payer: MEDICAID

## 2018-05-15 VITALS
HEIGHT: 55 IN | SYSTOLIC BLOOD PRESSURE: 110 MMHG | WEIGHT: 209 LBS | DIASTOLIC BLOOD PRESSURE: 70 MMHG | HEART RATE: 74 BPM | BODY MASS INDEX: 48.37 KG/M2 | RESPIRATION RATE: 16 BRPM

## 2018-05-15 DIAGNOSIS — N32.81 OVERACTIVE BLADDER: ICD-10-CM

## 2018-05-15 DIAGNOSIS — R45.89 DEPRESSED MOOD: ICD-10-CM

## 2018-05-15 PROCEDURE — 99999 PR PBB SHADOW E&M-EST. PATIENT-LVL III: CPT | Mod: PBBFAC,,, | Performed by: FAMILY MEDICINE

## 2018-05-15 PROCEDURE — 99213 OFFICE O/P EST LOW 20 MIN: CPT | Mod: S$PBB,,, | Performed by: FAMILY MEDICINE

## 2018-05-15 PROCEDURE — 99213 OFFICE O/P EST LOW 20 MIN: CPT | Mod: PBBFAC | Performed by: FAMILY MEDICINE

## 2018-05-15 RX ORDER — BUPROPION HYDROCHLORIDE 75 MG/1
75 TABLET ORAL 2 TIMES DAILY
Qty: 60 TABLET | Refills: 5 | Status: SHIPPED | OUTPATIENT
Start: 2018-05-15 | End: 2018-06-14

## 2018-05-15 NOTE — PROGRESS NOTES
Subjective:       Patient ID: Jessi Linton is a 52 y.o. female.    Chief Complaint: Follow-up ( 3 week )    Pt is a 52 y.o. female who presents for evaluation and management of   Encounter Diagnoses   Name Primary?    Depressed mood     Overactive bladder    .Loss 7# with resumption of wellbutrin     Doing well on current meds. Denies any side effects. Prevention is up to date.    Review of Systems   Gastrointestinal: Negative for constipation.   Genitourinary: Negative for difficulty urinating.   Psychiatric/Behavioral: Negative for dysphoric mood and suicidal ideas.       Objective:      Physical Exam   Constitutional: She is oriented to person, place, and time. She appears well-developed and well-nourished.   HENT:   Head: Normocephalic and atraumatic.   Right Ear: External ear normal.   Left Ear: External ear normal.   Nose: Nose normal.   Mouth/Throat: Oropharynx is clear and moist.   Eyes: EOM are normal. Pupils are equal, round, and reactive to light.   Neck: Normal range of motion. Neck supple. No JVD present. No tracheal deviation present. No thyromegaly present.   Cardiovascular: Normal rate, normal heart sounds and intact distal pulses.    No murmur heard.  Pulmonary/Chest: Effort normal and breath sounds normal. No respiratory distress. She has no wheezes. She has no rales. She exhibits no tenderness.   Abdominal: Soft. Bowel sounds are normal. She exhibits no distension and no mass. There is no tenderness. There is no rebound and no guarding.   Musculoskeletal: Normal range of motion. She exhibits no edema or tenderness.   Lymphadenopathy:     She has no cervical adenopathy.   Neurological: She is alert and oriented to person, place, and time. She has normal reflexes. She displays normal reflexes. No cranial nerve deficit. She exhibits normal muscle tone. Coordination normal.   Skin: Skin is warm and dry. No rash noted. No erythema. No pallor.   Psychiatric: She has a normal mood and affect. Her  behavior is normal. Judgment and thought content normal.       Assessment:       1. Depressed mood    2. Overactive bladder        Plan:   Jessi Conway was seen today for follow-up.    Diagnoses and all orders for this visit:    Depressed mood  -     buPROPion (WELLBUTRIN) 75 MG tablet; Take 1 tablet (75 mg total) by mouth 2 (two) times daily.    Overactive bladder    continue ditropan 20mg daily and above     RTC 6 months       No Follow-up on file.

## 2018-05-18 ENCOUNTER — OFFICE VISIT (OUTPATIENT)
Dept: FAMILY MEDICINE | Facility: CLINIC | Age: 53
End: 2018-05-18
Payer: MEDICAID

## 2018-05-18 VITALS
WEIGHT: 213 LBS | HEART RATE: 88 BPM | OXYGEN SATURATION: 98 % | SYSTOLIC BLOOD PRESSURE: 124 MMHG | DIASTOLIC BLOOD PRESSURE: 78 MMHG | HEIGHT: 55 IN | TEMPERATURE: 98 F | BODY MASS INDEX: 49.3 KG/M2

## 2018-05-18 DIAGNOSIS — J04.0 LARYNGITIS: Primary | ICD-10-CM

## 2018-05-18 DIAGNOSIS — R05.9 COUGH: ICD-10-CM

## 2018-05-18 PROCEDURE — 99215 OFFICE O/P EST HI 40 MIN: CPT | Mod: PBBFAC | Performed by: NURSE PRACTITIONER

## 2018-05-18 PROCEDURE — 99213 OFFICE O/P EST LOW 20 MIN: CPT | Mod: S$PBB,,, | Performed by: NURSE PRACTITIONER

## 2018-05-18 PROCEDURE — 99999 PR PBB SHADOW E&M-EST. PATIENT-LVL V: CPT | Mod: PBBFAC,,, | Performed by: NURSE PRACTITIONER

## 2018-05-18 RX ORDER — AZITHROMYCIN 500 MG/1
500 TABLET, FILM COATED ORAL DAILY
Qty: 3 TABLET | Refills: 0 | Status: SHIPPED | OUTPATIENT
Start: 2018-05-18 | End: 2018-05-21

## 2018-05-18 RX ORDER — PROMETHAZINE HYDROCHLORIDE AND CODEINE PHOSPHATE 6.25; 1 MG/5ML; MG/5ML
5 SOLUTION ORAL EVERY 6 HOURS PRN
Qty: 120 ML | Refills: 0 | Status: SHIPPED | OUTPATIENT
Start: 2018-05-18 | End: 2018-12-27

## 2018-05-18 RX ORDER — METHYLPREDNISOLONE ACETATE 40 MG/ML
60 INJECTION, SUSPENSION INTRA-ARTICULAR; INTRALESIONAL; INTRAMUSCULAR; SOFT TISSUE
Status: COMPLETED | OUTPATIENT
Start: 2018-05-18 | End: 2018-05-18

## 2018-05-18 RX ADMIN — METHYLPREDNISOLONE ACETATE 60 MG: 40 INJECTION, SUSPENSION INTRA-ARTICULAR; INTRALESIONAL; INTRAMUSCULAR; SOFT TISSUE at 01:05

## 2018-05-18 NOTE — PROGRESS NOTES
Subjective:       Patient ID: Jessi Linton is a 52 y.o. female.    Chief Complaint: Cough and Nasal Congestion    Cough   This is a new problem. The current episode started in the past 7 days. The problem has been gradually worsening. The cough is non-productive. Associated symptoms include a fever, shortness of breath and wheezing. Pertinent negatives include no ear pain, headaches, myalgias, nasal congestion, postnasal drip, rhinorrhea or sore throat. Associated symptoms comments: Laryngitis.     Review of Systems   Constitutional: Positive for fever. Negative for appetite change.   HENT: Positive for voice change. Negative for congestion, ear pain, postnasal drip, rhinorrhea and sore throat.    Eyes: Negative.  Negative for visual disturbance.   Respiratory: Positive for cough, shortness of breath and wheezing.    Cardiovascular: Negative.    Gastrointestinal: Negative.  Negative for abdominal pain, diarrhea, nausea and vomiting.   Endocrine: Negative.    Genitourinary: Negative.  Negative for difficulty urinating and urgency.   Musculoskeletal: Negative.  Negative for arthralgias and myalgias.   Skin: Negative.  Negative for color change.   Allergic/Immunologic: Negative.    Neurological: Negative.  Negative for dizziness and headaches.   Hematological: Negative.    Psychiatric/Behavioral: Negative.  Negative for sleep disturbance.   All other systems reviewed and are negative.      Objective:      Physical Exam   Constitutional: She is oriented to person, place, and time. She appears well-developed and well-nourished.   HENT:   Head: Normocephalic and atraumatic.   Right Ear: External ear normal.   Left Ear: External ear normal.   Nose: Nose normal.   Mouth/Throat: Oropharynx is clear and moist.   Laryngitis     Eyes: Conjunctivae and EOM are normal. Pupils are equal, round, and reactive to light.   Neck: Normal range of motion. Neck supple. No thyromegaly present.   Cardiovascular: Normal rate, regular  rhythm and normal heart sounds.    No murmur heard.  Pulmonary/Chest: Effort normal and breath sounds normal.   Tight cough in office.   Abdominal: Soft.   Musculoskeletal: Normal range of motion.   Lymphadenopathy:     She has no cervical adenopathy.   Neurological: She is alert and oriented to person, place, and time. She has normal reflexes.   Skin: Skin is warm and dry. No rash noted.   Psychiatric: She has a normal mood and affect.   Nursing note and vitals reviewed.      Assessment:       1. Laryngitis    2. Cough        Plan:   Jessi Conway was seen today for cough and nasal congestion.    Diagnoses and all orders for this visit:    Laryngitis  -     methylPREDNISolone acetate injection 60 mg; Inject 1.5 mLs (60 mg total) into the muscle one time.  -     azithromycin (ZITHROMAX) 500 MG tablet; Take 1 tablet (500 mg total) by mouth once daily.    Cough  -     promethazine-codeine 6.25-10 mg/5 ml (PHENERGAN WITH CODEINE) 6.25-10 mg/5 mL syrup; Take 5 mLs by mouth every 6 (six) hours as needed for Cough.    Use nebulizer QID    RTC PRN

## 2018-05-21 DIAGNOSIS — R45.89 DEPRESSED MOOD: ICD-10-CM

## 2018-05-21 RX ORDER — BUPROPION HYDROCHLORIDE 75 MG/1
75 TABLET ORAL 2 TIMES DAILY
Qty: 60 TABLET | Refills: 0 | Status: SHIPPED | OUTPATIENT
Start: 2018-05-21 | End: 2018-06-20

## 2018-05-22 DIAGNOSIS — R45.89 DEPRESSED MOOD: ICD-10-CM

## 2018-05-23 RX ORDER — BUPROPION HYDROCHLORIDE 75 MG/1
75 TABLET ORAL 2 TIMES DAILY
Qty: 60 TABLET | Refills: 0 | Status: SHIPPED | OUTPATIENT
Start: 2018-05-23 | End: 2018-08-18 | Stop reason: SDUPTHER

## 2018-06-25 DIAGNOSIS — E89.40 SURGICAL MENOPAUSE: ICD-10-CM

## 2018-06-25 RX ORDER — ESTROGENS, CONJUGATED 0.62 MG/1
0.62 TABLET, FILM COATED ORAL DAILY
Qty: 30 TABLET | Refills: 5 | Status: SHIPPED | OUTPATIENT
Start: 2018-06-25 | End: 2018-11-19

## 2018-06-25 RX ORDER — MONTELUKAST SODIUM 10 MG/1
TABLET ORAL
Qty: 30 TABLET | Refills: 5 | Status: SHIPPED | OUTPATIENT
Start: 2018-06-25 | End: 2018-12-18 | Stop reason: SDUPTHER

## 2018-08-13 ENCOUNTER — TELEPHONE (OUTPATIENT)
Dept: FAMILY MEDICINE | Facility: CLINIC | Age: 53
End: 2018-08-13

## 2018-08-13 DIAGNOSIS — Z12.39 BREAST CANCER SCREENING: Primary | ICD-10-CM
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
## 2018-08-13 NOTE — TELEPHONE ENCOUNTER
----- Message from Ephraim Cowart sent at 2018 12:58 PM CDT -----  Contact: Patient  Jessi Linton  MRN: [id number]  : 1965  PCP: David Allred  Home Phone      [phone]  Work Phone      Not on file.  [place]          [phone]      MESSAGE: waiting on MM to be scheduled -- would like status    Call [phone]    PCP: Brigida

## 2018-08-18 DIAGNOSIS — R45.89 DEPRESSED MOOD: ICD-10-CM

## 2018-08-19 RX ORDER — BUPROPION HYDROCHLORIDE 75 MG/1
TABLET ORAL
Qty: 60 TABLET | Refills: 0 | Status: SHIPPED | OUTPATIENT
Start: 2018-08-19 | End: 2018-09-27 | Stop reason: SDUPTHER

## 2018-08-24 RX ORDER — PROPRANOLOL HYDROCHLORIDE 20 MG/1
TABLET ORAL
Qty: 60 TABLET | Refills: 5 | Status: SHIPPED | OUTPATIENT
Start: 2018-08-24 | End: 2018-12-27 | Stop reason: SDUPTHER

## 2018-08-29 ENCOUNTER — HOSPITAL ENCOUNTER (OUTPATIENT)
Dept: RADIOLOGY | Facility: HOSPITAL | Age: 53
Discharge: HOME | End: 2018-08-29
Attending: FAMILY MEDICINE
Payer: MEDICAID

## 2018-08-29 VITALS — WEIGHT: 213 LBS | HEIGHT: 55 IN | BODY MASS INDEX: 49.3 KG/M2

## 2018-08-29 DIAGNOSIS — Z12.39 BREAST CANCER SCREENING: ICD-10-CM

## 2018-08-29 PROCEDURE — 77067 SCR MAMMO BI INCL CAD: CPT | Mod: 26,,, | Performed by: RADIOLOGY

## 2018-08-29 PROCEDURE — 77063 BREAST TOMOSYNTHESIS BI: CPT | Mod: 26,,, | Performed by: RADIOLOGY

## 2018-08-29 PROCEDURE — 77063 BREAST TOMOSYNTHESIS BI: CPT | Mod: TC

## 2018-08-30 RX ORDER — PROPRANOLOL HYDROCHLORIDE 20 MG/1
TABLET ORAL
Qty: 60 TABLET | Refills: 5 | Status: SHIPPED | OUTPATIENT
Start: 2018-08-30 | End: 2018-11-19 | Stop reason: SDUPTHER

## 2018-09-12 DIAGNOSIS — N32.81 OAB (OVERACTIVE BLADDER): ICD-10-CM

## 2018-09-12 RX ORDER — OXYBUTYNIN CHLORIDE 15 MG/1
15 TABLET, EXTENDED RELEASE ORAL DAILY
Qty: 30 TABLET | Refills: 5 | Status: SHIPPED | OUTPATIENT
Start: 2018-09-12 | End: 2018-11-19 | Stop reason: SDUPTHER

## 2018-09-27 DIAGNOSIS — R45.89 DEPRESSED MOOD: ICD-10-CM

## 2018-09-27 RX ORDER — BUPROPION HYDROCHLORIDE 75 MG/1
TABLET ORAL
Qty: 60 TABLET | Refills: 0 | Status: SHIPPED | OUTPATIENT
Start: 2018-09-27 | End: 2018-10-26 | Stop reason: SDUPTHER

## 2018-10-19 DIAGNOSIS — N32.81 OVERACTIVE BLADDER: ICD-10-CM

## 2018-10-19 RX ORDER — OXYBUTYNIN CHLORIDE 5 MG/1
5 TABLET, EXTENDED RELEASE ORAL DAILY
Qty: 30 TABLET | Refills: 5 | Status: SHIPPED | OUTPATIENT
Start: 2018-10-19 | End: 2018-11-19 | Stop reason: SDUPTHER

## 2018-10-26 DIAGNOSIS — R45.89 DEPRESSED MOOD: ICD-10-CM

## 2018-10-26 RX ORDER — BUPROPION HYDROCHLORIDE 75 MG/1
TABLET ORAL
Qty: 60 TABLET | Refills: 0 | Status: SHIPPED | OUTPATIENT
Start: 2018-10-26 | End: 2018-11-19

## 2018-11-19 ENCOUNTER — OFFICE VISIT (OUTPATIENT)
Dept: FAMILY MEDICINE | Facility: CLINIC | Age: 53
End: 2018-11-19
Payer: MEDICAID

## 2018-11-19 VITALS
RESPIRATION RATE: 18 BRPM | DIASTOLIC BLOOD PRESSURE: 82 MMHG | SYSTOLIC BLOOD PRESSURE: 116 MMHG | WEIGHT: 221.13 LBS | HEIGHT: 55 IN | OXYGEN SATURATION: 98 % | BODY MASS INDEX: 51.17 KG/M2 | HEART RATE: 64 BPM

## 2018-11-19 DIAGNOSIS — E66.01 CLASS 3 SEVERE OBESITY DUE TO EXCESS CALORIES WITHOUT SERIOUS COMORBIDITY WITH BODY MASS INDEX (BMI) OF 40.0 TO 44.9 IN ADULT: ICD-10-CM

## 2018-11-19 DIAGNOSIS — N32.81 OVERACTIVE BLADDER: ICD-10-CM

## 2018-11-19 DIAGNOSIS — G47.33 OSA (OBSTRUCTIVE SLEEP APNEA): ICD-10-CM

## 2018-11-19 DIAGNOSIS — N32.81 OAB (OVERACTIVE BLADDER): ICD-10-CM

## 2018-11-19 DIAGNOSIS — E55.9 VITAMIN D DEFICIENCY DISEASE: ICD-10-CM

## 2018-11-19 DIAGNOSIS — E89.40 SURGICAL MENOPAUSE: ICD-10-CM

## 2018-11-19 DIAGNOSIS — R73.02 GLUCOSE INTOLERANCE (IMPAIRED GLUCOSE TOLERANCE): ICD-10-CM

## 2018-11-19 DIAGNOSIS — K21.9 GASTROESOPHAGEAL REFLUX DISEASE, ESOPHAGITIS PRESENCE NOT SPECIFIED: Primary | ICD-10-CM

## 2018-11-19 PROCEDURE — 90471 IMMUNIZATION ADMIN: CPT | Mod: PBBFAC

## 2018-11-19 PROCEDURE — 99999 PR PBB SHADOW E&M-EST. PATIENT-LVL III: CPT | Mod: PBBFAC,,, | Performed by: FAMILY MEDICINE

## 2018-11-19 PROCEDURE — 99213 OFFICE O/P EST LOW 20 MIN: CPT | Mod: PBBFAC | Performed by: FAMILY MEDICINE

## 2018-11-19 PROCEDURE — 99214 OFFICE O/P EST MOD 30 MIN: CPT | Mod: S$PBB,,, | Performed by: FAMILY MEDICINE

## 2018-11-19 RX ORDER — OXYBUTYNIN CHLORIDE 15 MG/1
15 TABLET, EXTENDED RELEASE ORAL DAILY
Qty: 30 TABLET | Refills: 5 | Status: SHIPPED | OUTPATIENT
Start: 2018-11-19 | End: 2019-05-20 | Stop reason: SDUPTHER

## 2018-11-19 RX ORDER — OXYBUTYNIN CHLORIDE 5 MG/1
5 TABLET, EXTENDED RELEASE ORAL DAILY
Qty: 30 TABLET | Refills: 5 | Status: SHIPPED | OUTPATIENT
Start: 2018-11-19 | End: 2019-07-18 | Stop reason: ALTCHOICE

## 2018-11-19 NOTE — PROGRESS NOTES
Subjective:       Patient ID: Jessi Linton is a 52 y.o. female.    Chief Complaint: Follow-up (6 month)    Pt is a 52 y.o. female who presents for evaluation and management of   Encounter Diagnoses   Name Primary?    Gastroesophageal reflux disease, esophagitis presence not specified Yes    Glucose intolerance (impaired glucose tolerance)     Class 3 severe obesity due to excess calories without serious comorbidity with body mass index (BMI) of 40.0 to 44.9 in adult     MAME (obstructive sleep apnea)     OAB (overactive bladder)     Overactive bladder     Surgical menopause     Vitamin D deficiency disease    .  Doing well on current meds. Denies any side effects. Prevention is up to date.    Review of Systems   Constitutional: Positive for unexpected weight change. Negative for chills and fever.   Respiratory: Positive for cough. Negative for shortness of breath.    Cardiovascular: Negative for chest pain and palpitations.   Gastrointestinal: Negative for abdominal pain, blood in stool, constipation and nausea.   Genitourinary: Negative for difficulty urinating.        Urge incontinence    Psychiatric/Behavioral: Negative for dysphoric mood, sleep disturbance and suicidal ideas. The patient is not nervous/anxious.        Objective:      Physical Exam   Constitutional: She is oriented to person, place, and time. She appears well-developed and well-nourished.   Obese    HENT:   Head: Normocephalic and atraumatic.   Right Ear: External ear normal.   Left Ear: External ear normal.   Nose: Nose normal.   Mouth/Throat: Oropharynx is clear and moist.   Eyes: EOM are normal. Pupils are equal, round, and reactive to light.   Neck: Normal range of motion. Neck supple. No JVD present. No tracheal deviation present. No thyromegaly present.   Cardiovascular: Normal rate, normal heart sounds and intact distal pulses.   No murmur heard.  Pulmonary/Chest: Effort normal and breath sounds normal. No respiratory  distress. She has no wheezes. She has no rales. She exhibits no tenderness.   Abdominal: Soft. Bowel sounds are normal. She exhibits no distension and no mass. There is no tenderness. There is no rebound and no guarding.   Musculoskeletal: Normal range of motion. She exhibits no edema or tenderness.   Lymphadenopathy:     She has no cervical adenopathy.   Neurological: She is alert and oriented to person, place, and time. She has normal reflexes. She displays normal reflexes. No cranial nerve deficit. She exhibits normal muscle tone. Coordination normal.   Skin: Skin is warm and dry. No rash noted. No erythema. No pallor.   Psychiatric: She has a normal mood and affect. Her behavior is normal. Judgment and thought content normal.       Assessment:       1. Gastroesophageal reflux disease, esophagitis presence not specified    2. Glucose intolerance (impaired glucose tolerance)    3. Class 3 severe obesity due to excess calories without serious comorbidity with body mass index (BMI) of 40.0 to 44.9 in adult    4. MAME (obstructive sleep apnea)    5. OAB (overactive bladder)    6. Overactive bladder    7. Surgical menopause    8. Vitamin D deficiency disease        Plan:   Jessi Conway was seen today for follow-up.    Diagnoses and all orders for this visit:    Gastroesophageal reflux disease, esophagitis presence not specified    Glucose intolerance (impaired glucose tolerance)  -     Comprehensive metabolic panel; Future  -     Lipid panel; Future  -     Hemoglobin A1c; Future    Class 3 severe obesity due to excess calories without serious comorbidity with body mass index (BMI) of 40.0 to 44.9 in adult  -     Comprehensive metabolic panel; Future  -     Lipid panel; Future  -     Hemoglobin A1c; Future  -     TSH; Future    MAME (obstructive sleep apnea)    OAB (overactive bladder)  -     oxybutynin (DITROPAN XL) 15 MG TR24; Take 1 tablet (15 mg total) by mouth once daily.    Overactive bladder  -     oxybutynin  (DITROPAN-XL) 5 MG TR24; Take 1 tablet (5 mg total) by mouth once daily.    Surgical menopause  -     estrogens, conjugated, (PREMARIN) 0.45 MG tablet; Take 1 tablet (0.45 mg total) by mouth once daily.    Vitamin D deficiency disease  -     Vitamin D; Future      Problem List Items Addressed This Visit     GERD (gastroesophageal reflux disease) - Primary    Glucose intolerance (impaired glucose tolerance)    Relevant Orders    Comprehensive metabolic panel    Lipid panel    Hemoglobin A1c    OAB (overactive bladder)    Relevant Medications    oxybutynin (DITROPAN XL) 15 MG TR24    Obesity    Relevant Orders    Comprehensive metabolic panel    Lipid panel    Hemoglobin A1c    TSH    MAME (obstructive sleep apnea)    Surgical menopause    Relevant Medications    estrogens, conjugated, (PREMARIN) 0.45 MG tablet      Other Visit Diagnoses     Overactive bladder        Relevant Medications    oxybutynin (DITROPAN-XL) 5 MG TR24    Vitamin D deficiency disease        Relevant Orders    Vitamin D        Lose weight   The patient is asked to make an attempt to improve diet and exercise patterns to aid in medical management of this problem.  5 small meals a day. Cut out white carbs: bread, rice, pasta, potatoes. Exercise/walk 5x/week for at least 30 minutes  .  D/C wellbutrin     RTC 6 months   No Follow-up on file.

## 2018-11-20 ENCOUNTER — CLINICAL SUPPORT (OUTPATIENT)
Dept: FAMILY MEDICINE | Facility: CLINIC | Age: 53
End: 2018-11-20
Payer: MEDICAID

## 2018-11-20 DIAGNOSIS — E66.01 CLASS 3 SEVERE OBESITY DUE TO EXCESS CALORIES WITHOUT SERIOUS COMORBIDITY WITH BODY MASS INDEX (BMI) OF 40.0 TO 44.9 IN ADULT: ICD-10-CM

## 2018-11-20 DIAGNOSIS — E55.9 VITAMIN D DEFICIENCY DISEASE: ICD-10-CM

## 2018-11-20 DIAGNOSIS — R73.02 GLUCOSE INTOLERANCE (IMPAIRED GLUCOSE TOLERANCE): ICD-10-CM

## 2018-11-20 LAB
25(OH)D3+25(OH)D2 SERPL-MCNC: 34 NG/ML
ALBUMIN SERPL BCP-MCNC: 3.6 G/DL
ALP SERPL-CCNC: 66 U/L
ALT SERPL W/O P-5'-P-CCNC: 25 U/L
ANION GAP SERPL CALC-SCNC: 7 MMOL/L
AST SERPL-CCNC: 23 U/L
BILIRUB SERPL-MCNC: 0.6 MG/DL
BUN SERPL-MCNC: 12 MG/DL
CALCIUM SERPL-MCNC: 10.1 MG/DL
CHLORIDE SERPL-SCNC: 101 MMOL/L
CHOLEST SERPL-MCNC: 218 MG/DL
CHOLEST/HDLC SERPL: 4.6 {RATIO}
CO2 SERPL-SCNC: 32 MMOL/L
CREAT SERPL-MCNC: 0.8 MG/DL
EST. GFR  (AFRICAN AMERICAN): >60 ML/MIN/1.73 M^2
EST. GFR  (NON AFRICAN AMERICAN): >60 ML/MIN/1.73 M^2
ESTIMATED AVG GLUCOSE: 100 MG/DL
GLUCOSE SERPL-MCNC: 85 MG/DL
HBA1C MFR BLD HPLC: 5.1 %
HDLC SERPL-MCNC: 47 MG/DL
HDLC SERPL: 21.6 %
LDLC SERPL CALC-MCNC: 129.8 MG/DL
NONHDLC SERPL-MCNC: 171 MG/DL
POTASSIUM SERPL-SCNC: 4.1 MMOL/L
PROT SERPL-MCNC: 7.4 G/DL
SODIUM SERPL-SCNC: 140 MMOL/L
TRIGL SERPL-MCNC: 206 MG/DL
TSH SERPL DL<=0.005 MIU/L-ACNC: 1.1 UIU/ML

## 2018-11-20 PROCEDURE — 80053 COMPREHEN METABOLIC PANEL: CPT

## 2018-11-20 PROCEDURE — 84443 ASSAY THYROID STIM HORMONE: CPT

## 2018-11-20 PROCEDURE — 99211 OFF/OP EST MAY X REQ PHY/QHP: CPT | Mod: PBBFAC

## 2018-11-20 PROCEDURE — 99999 PR PBB SHADOW E&M-EST. PATIENT-LVL I: CPT | Mod: PBBFAC,,,

## 2018-11-20 PROCEDURE — 80061 LIPID PANEL: CPT

## 2018-11-20 PROCEDURE — 82306 VITAMIN D 25 HYDROXY: CPT

## 2018-11-20 PROCEDURE — 83036 HEMOGLOBIN GLYCOSYLATED A1C: CPT

## 2018-11-20 PROCEDURE — 36415 COLL VENOUS BLD VENIPUNCTURE: CPT | Mod: PBBFAC

## 2018-12-17 ENCOUNTER — OFFICE VISIT (OUTPATIENT)
Dept: FAMILY MEDICINE | Facility: CLINIC | Age: 53
End: 2018-12-17
Payer: MEDICAID

## 2018-12-17 ENCOUNTER — TELEPHONE (OUTPATIENT)
Dept: FAMILY MEDICINE | Facility: CLINIC | Age: 53
End: 2018-12-17

## 2018-12-17 VITALS
SYSTOLIC BLOOD PRESSURE: 132 MMHG | BODY MASS INDEX: 51.58 KG/M2 | DIASTOLIC BLOOD PRESSURE: 80 MMHG | TEMPERATURE: 97 F | HEART RATE: 76 BPM | RESPIRATION RATE: 20 BRPM | HEIGHT: 55 IN | WEIGHT: 222.88 LBS | OXYGEN SATURATION: 98 %

## 2018-12-17 DIAGNOSIS — J32.9 SINUSITIS, UNSPECIFIED CHRONICITY, UNSPECIFIED LOCATION: Primary | ICD-10-CM

## 2018-12-17 DIAGNOSIS — R05.9 COUGH: ICD-10-CM

## 2018-12-17 PROCEDURE — 99999 PR PBB SHADOW E&M-EST. PATIENT-LVL V: CPT | Mod: PBBFAC,,, | Performed by: FAMILY MEDICINE

## 2018-12-17 PROCEDURE — 96372 THER/PROPH/DIAG INJ SC/IM: CPT | Mod: PBBFAC

## 2018-12-17 PROCEDURE — 99215 OFFICE O/P EST HI 40 MIN: CPT | Mod: PBBFAC,25 | Performed by: FAMILY MEDICINE

## 2018-12-17 PROCEDURE — 99213 OFFICE O/P EST LOW 20 MIN: CPT | Mod: S$PBB,,, | Performed by: FAMILY MEDICINE

## 2018-12-17 RX ORDER — DOXYCYCLINE 100 MG/1
100 CAPSULE ORAL 2 TIMES DAILY
Qty: 20 CAPSULE | Refills: 0 | Status: SHIPPED | OUTPATIENT
Start: 2018-12-17 | End: 2018-12-27 | Stop reason: ALTCHOICE

## 2018-12-17 RX ORDER — METHYLPREDNISOLONE ACETATE 40 MG/ML
60 INJECTION, SUSPENSION INTRA-ARTICULAR; INTRALESIONAL; INTRAMUSCULAR; SOFT TISSUE
Status: COMPLETED | OUTPATIENT
Start: 2018-12-17 | End: 2018-12-17

## 2018-12-17 RX ORDER — AZITHROMYCIN 500 MG/1
500 TABLET, FILM COATED ORAL DAILY
Qty: 3 TABLET | Refills: 0 | Status: SHIPPED | OUTPATIENT
Start: 2018-12-17 | End: 2018-12-20

## 2018-12-17 RX ORDER — PROMETHAZINE HYDROCHLORIDE AND DEXTROMETHORPHAN HYDROBROMIDE 6.25; 15 MG/5ML; MG/5ML
5 SYRUP ORAL EVERY 6 HOURS PRN
Qty: 180 ML | Refills: 0 | Status: SHIPPED | OUTPATIENT
Start: 2018-12-17 | End: 2018-12-27

## 2018-12-17 RX ADMIN — METHYLPREDNISOLONE ACETATE 60 MG: 40 INJECTION, SUSPENSION INTRA-ARTICULAR; INTRALESIONAL; INTRAMUSCULAR; SOFT TISSUE at 04:12

## 2018-12-17 NOTE — TELEPHONE ENCOUNTER
----- Message from Ephraim Cowart sent at 2018  8:17 AM CST -----  Contact: Patient  Jessi Linton  MRN: [id number]  : 1965  PCP: David Allred  Home Phone      [phone]  Work Phone      Not on file.  [employer]          [phone]      MESSAGE: cough - sore throat- fever -- requesting appt today with Dr Allred    Call [phone]    PCP: Brigida

## 2018-12-17 NOTE — TELEPHONE ENCOUNTER
----- Message from Brandee Avilez sent at 2018  8:43 AM CST -----  Contact: Self  Jessi Linton  MRN: [id number]  : 1965  PCP: David Allred  Home Phone      [phone]  Work Phone      Not on file.  Mobile          [phone]      MESSAGE:   Pt requests a sooner appointment than the  can schedule.  Does patient feel like they need to be seen today:  yes  What is the nature of the appointment:  Cough, congestion  What visit type:  est  Did you check other providers/department schedules for availability:   yes  Comments:     Phone:  [phone]

## 2018-12-17 NOTE — PROGRESS NOTES
Subjective:       Patient ID: Jessi Linton is a 53 y.o. female.    Chief Complaint: Fever; Cough; and Sore Throat    Pt is 53 y.o. female who c/o 7 day h/o sinus congestion and h/a. Pos PND and cough.  No relief with OTC antihistamine/decongestant cold preparations. Severity is moderate.  Review of Systems   Constitutional: Negative for fatigue and fever.        Temp last night 100.2    HENT: Positive for congestion, postnasal drip, rhinorrhea and sinus pressure.    Eyes: Negative for itching.   Respiratory: Positive for cough. Negative for shortness of breath and wheezing.         Dry cough      Cardiovascular: Negative.  Negative for chest pain and palpitations.   Gastrointestinal: Negative.  Negative for diarrhea, nausea and vomiting.   Genitourinary: Negative.    Musculoskeletal: Negative.    Skin: Negative.    Neurological: Negative.    Psychiatric/Behavioral: Negative.        Objective:    Physical Exam   Constitutional: She appears well-developed and well-nourished.   HENT:   Head: Normocephalic and atraumatic.   Right Ear: Tympanic membrane and external ear normal.   Left Ear: Tympanic membrane and external ear normal.   Nose: Mucosal edema and rhinorrhea present.   Mouth/Throat: Uvula is midline.   Eyes: Conjunctivae are normal.   Neck: Trachea normal and normal range of motion. Neck supple. No thyromegaly present.   Cardiovascular: Normal rate, regular rhythm, S1 normal, S2 normal and normal heart sounds.   No murmur heard.  Pulmonary/Chest: Effort normal. No respiratory distress.   Abdominal: Soft. Normal appearance. There is no tenderness.   Musculoskeletal: Normal range of motion.   Lymphadenopathy:     She has no cervical adenopathy.   Neurological: She is alert.   Skin: Skin is warm, dry and intact.   Psychiatric: She has a normal mood and affect. Her speech is normal.   Vitals reviewed.      Assessment:       1. Sinusitis, unspecified chronicity, unspecified location    2. Cough        Plan:    Jessi Conway was seen today for fever, cough and sore throat.    Diagnoses and all orders for this visit:    Sinusitis, unspecified chronicity, unspecified location  -     methylPREDNISolone acetate injection 60 mg  -     azithromycin (ZITHROMAX) 500 MG tablet; Take 1 tablet (500 mg total) by mouth once daily. for 3 days    Cough  -     promethazine-dextromethorphan (PROMETHAZINE-DM) 6.25-15 mg/5 mL Syrp; Take 5 mLs by mouth every 6 (six) hours as needed.      No Follow-up on file.

## 2018-12-18 RX ORDER — MONTELUKAST SODIUM 10 MG/1
TABLET ORAL
Qty: 30 TABLET | Refills: 5 | Status: SHIPPED | OUTPATIENT
Start: 2018-12-18 | End: 2019-07-11 | Stop reason: SDUPTHER

## 2018-12-18 NOTE — TELEPHONE ENCOUNTER
----- Message from Brandee Avilez sent at 2018  5:30 PM CST -----  Contact: Mother- lila Linton  MRN: [id number]  : 1965  PCP: David Allred  Home Phone      [phone]  Work Phone      Not on file.  Mobile          [phone]      MESSAGE:   Pt requesting refill or new Rx.   RX name and strength: azithromycin (ZITHROMAX) 500 MG tablet  Pharmacy name and location:  [facility]  Comments:    Patient states that this is not covered by insurance, zpack amoxacillin cefdinir would be covered for the patient.    Phone:  [phone]

## 2018-12-26 ENCOUNTER — TELEPHONE (OUTPATIENT)
Dept: FAMILY MEDICINE | Facility: CLINIC | Age: 53
End: 2018-12-26

## 2018-12-26 NOTE — TELEPHONE ENCOUNTER
----- Message from Brandee Avilez sent at 2018  8:06 AM CST -----  Contact: Self  Jessi Linton  MRN: [id number]  : 1965  PCP: David Allred  Home Phone      [phone]  Work Phone      Not on file.  Mobile          [phone]      MESSAGE:   Pt requests a sooner appointment than the  can schedule.  Does patient feel like they need to be seen today:  yes  What is the nature of the appointment:  Congestion, came last week to see Dr. Allred  What visit type:  est  Did you check other providers/department schedules for availability:   yes  Comments:     Phone:  [phone]

## 2018-12-27 ENCOUNTER — TELEPHONE (OUTPATIENT)
Dept: FAMILY MEDICINE | Facility: CLINIC | Age: 53
End: 2018-12-27

## 2018-12-27 ENCOUNTER — OFFICE VISIT (OUTPATIENT)
Dept: FAMILY MEDICINE | Facility: CLINIC | Age: 53
End: 2018-12-27
Payer: MEDICAID

## 2018-12-27 VITALS
SYSTOLIC BLOOD PRESSURE: 134 MMHG | WEIGHT: 223.63 LBS | DIASTOLIC BLOOD PRESSURE: 72 MMHG | BODY MASS INDEX: 51.76 KG/M2 | HEIGHT: 55 IN | OXYGEN SATURATION: 96 % | TEMPERATURE: 98 F | HEART RATE: 68 BPM | RESPIRATION RATE: 18 BRPM

## 2018-12-27 DIAGNOSIS — R05.9 COUGH: ICD-10-CM

## 2018-12-27 DIAGNOSIS — R42 DIZZINESS: ICD-10-CM

## 2018-12-27 DIAGNOSIS — G43.009 MIGRAINE WITHOUT AURA AND WITHOUT STATUS MIGRAINOSUS, NOT INTRACTABLE: ICD-10-CM

## 2018-12-27 DIAGNOSIS — H92.01 OTALGIA, RIGHT: ICD-10-CM

## 2018-12-27 DIAGNOSIS — H66.003 ACUTE SUPPURATIVE OTITIS MEDIA OF BOTH EARS WITHOUT SPONTANEOUS RUPTURE OF TYMPANIC MEMBRANES, RECURRENCE NOT SPECIFIED: Primary | ICD-10-CM

## 2018-12-27 PROCEDURE — 99999 PR PBB SHADOW E&M-EST. PATIENT-LVL V: CPT | Mod: PBBFAC,,, | Performed by: NURSE PRACTITIONER

## 2018-12-27 PROCEDURE — 99213 OFFICE O/P EST LOW 20 MIN: CPT | Mod: 25,S$PBB,, | Performed by: NURSE PRACTITIONER

## 2018-12-27 PROCEDURE — 99215 OFFICE O/P EST HI 40 MIN: CPT | Mod: PBBFAC | Performed by: NURSE PRACTITIONER

## 2018-12-27 RX ORDER — PROMETHAZINE HYDROCHLORIDE AND CODEINE PHOSPHATE 6.25; 1 MG/5ML; MG/5ML
5 SOLUTION ORAL EVERY 6 HOURS PRN
Qty: 120 ML | Refills: 0 | Status: SHIPPED | OUTPATIENT
Start: 2018-12-27 | End: 2019-06-28

## 2018-12-27 RX ORDER — PROPRANOLOL HYDROCHLORIDE 20 MG/1
20 TABLET ORAL 2 TIMES DAILY
Qty: 60 TABLET | Refills: 5 | Status: CANCELLED | OUTPATIENT
Start: 2018-12-27

## 2018-12-27 RX ORDER — PROPRANOLOL HYDROCHLORIDE 20 MG/1
20 TABLET ORAL 2 TIMES DAILY
Qty: 60 TABLET | Refills: 5 | Status: SHIPPED | OUTPATIENT
Start: 2018-12-27 | End: 2019-12-30

## 2018-12-27 RX ORDER — AZELASTINE 1 MG/ML
1 SPRAY, METERED NASAL 2 TIMES DAILY
Qty: 30 ML | Refills: 5 | Status: SHIPPED | OUTPATIENT
Start: 2018-12-27 | End: 2020-01-09

## 2018-12-27 RX ORDER — CEPHALEXIN 500 MG/1
500 CAPSULE ORAL EVERY 12 HOURS
Qty: 20 CAPSULE | Refills: 0 | Status: SHIPPED | OUTPATIENT
Start: 2018-12-27 | End: 2019-01-06

## 2018-12-27 NOTE — TELEPHONE ENCOUNTER
----- Message from Kisha Richards sent at 2018  3:29 PM CST -----  Contact: Self  Jessi Linton  MRN: [id number]  : 1965  PCP: David Allred  Home Phone      [phone]  Work Phone      Not on file.  Mobile          [phone]      MESSAGE:   Pt requesting refill or new Rx.   Is this a refill or new RX: refill  RX name and strength: singular   Last office visit:   Is this a 30-day or 90-day RX:    Pharmacy name and location:  [facility] in [place]  Comments:      Phone:  [phone]

## 2018-12-27 NOTE — PROGRESS NOTES
Subjective:       Patient ID: Jessi Linton is a 53 y.o. female.    Chief Complaint: Otalgia (rt side) and Cough    Treated 10 days ago for sinusitis. Now with cough and right ear pain.      Otalgia    There is pain in the right ear. This is a new problem. The current episode started in the past 7 days. There has been no fever. Associated symptoms include coughing, rhinorrhea and a sore throat. Pertinent negatives include no abdominal pain, diarrhea, headaches or vomiting.   Cough   The current episode started in the past 7 days. The cough is productive of sputum. Associated symptoms include ear pain, nasal congestion, postnasal drip, rhinorrhea and a sore throat. Pertinent negatives include no fever, headaches, myalgias, shortness of breath or wheezing.     Review of Systems   Constitutional: Positive for fatigue. Negative for appetite change and fever.   HENT: Positive for congestion, ear pain, postnasal drip, rhinorrhea and sore throat.    Eyes: Negative.  Negative for visual disturbance.   Respiratory: Positive for cough. Negative for shortness of breath and wheezing.    Cardiovascular: Negative.    Gastrointestinal: Negative.  Negative for abdominal pain, diarrhea, nausea and vomiting.   Endocrine: Negative.    Genitourinary: Negative.  Negative for difficulty urinating and urgency.   Musculoskeletal: Negative.  Negative for arthralgias and myalgias.   Skin: Negative.  Negative for color change.   Allergic/Immunologic: Negative.    Neurological: Negative.  Negative for dizziness and headaches.   Hematological: Negative.    Psychiatric/Behavioral: Negative.  Negative for sleep disturbance.   All other systems reviewed and are negative.      Objective:      Physical Exam   Constitutional: She appears well-developed and well-nourished.   HENT:   Head: Normocephalic and atraumatic.   Right Ear: External ear normal. Tympanic membrane is erythematous and retracted. A middle ear effusion (mateo) is present.    Left Ear: External ear normal. Tympanic membrane is erythematous and retracted. A middle ear effusion (matoe) is present.   Nose: Mucosal edema and rhinorrhea present.   Mouth/Throat: Uvula is midline.   Eyes: Conjunctivae are normal.   Neck: Trachea normal and normal range of motion. Neck supple. No thyromegaly present.   Cardiovascular: Normal rate, regular rhythm, S1 normal, S2 normal and normal heart sounds.   No murmur heard.  Pulmonary/Chest: Effort normal. No respiratory distress.   Abdominal: Soft. Normal appearance. There is no tenderness.   Musculoskeletal: Normal range of motion.   Lymphadenopathy:     She has no cervical adenopathy.   Neurological: She is alert.   Skin: Skin is warm, dry and intact.   Psychiatric: She has a normal mood and affect. Her speech is normal.   Nursing note and vitals reviewed.      Assessment:       1. Acute suppurative otitis media of both ears without spontaneous rupture of tympanic membranes, recurrence not specified    2. Cough    3. Dizziness    4. Otalgia, right    5. Migraine without aura and without status migrainosus, not intractable        Plan:   Jessi Conway was seen today for otalgia and cough.    Diagnoses and all orders for this visit:    Acute suppurative otitis media of both ears without spontaneous rupture of tympanic membranes, recurrence not specified  -     azelastine (ASTELIN) 137 mcg (0.1 %) nasal spray; 1 spray (137 mcg total) by Nasal route 2 (two) times daily.  -     cephALEXin (KEFLEX) 500 MG capsule; Take 1 capsule (500 mg total) by mouth every 12 (twelve) hours. for 10 days    Cough  -     promethazine-codeine 6.25-10 mg/5 ml (PHENERGAN WITH CODEINE) 6.25-10 mg/5 mL syrup; Take 5 mLs by mouth every 6 (six) hours as needed for Cough.    Dizziness    Otalgia, right    Migraine without aura and without status migrainosus, not intractable  -     propranolol (INDERAL) 20 MG tablet; Take 1 tablet (20 mg total) by mouth 2 (two) times daily.    RTC PRN

## 2018-12-27 NOTE — TELEPHONE ENCOUNTER
Informed pt that Rx was sent to pharmacy. Verbalized Understanding. Spoke with patient mother medication was sent on 12/18/2018.

## 2019-01-22 DIAGNOSIS — E89.40 SURGICAL MENOPAUSE: ICD-10-CM

## 2019-01-22 RX ORDER — ESTROGENS, CONJUGATED 0.62 MG/1
0.62 TABLET, FILM COATED ORAL DAILY
Qty: 30 TABLET | Refills: 5 | Status: SHIPPED | OUTPATIENT
Start: 2019-01-22 | End: 2019-06-21

## 2019-03-22 DIAGNOSIS — N32.81 OVERACTIVE BLADDER: ICD-10-CM

## 2019-03-24 RX ORDER — OXYBUTYNIN CHLORIDE 5 MG/1
TABLET, EXTENDED RELEASE ORAL
Qty: 30 TABLET | Refills: 4 | Status: SHIPPED | OUTPATIENT
Start: 2019-03-24 | End: 2019-05-20 | Stop reason: SDUPTHER

## 2019-04-02 ENCOUNTER — TELEPHONE (OUTPATIENT)
Dept: FAMILY MEDICINE | Facility: CLINIC | Age: 54
End: 2019-04-02

## 2019-04-02 NOTE — TELEPHONE ENCOUNTER
Discussed with Dr. Allred.  Pt only gets labs done once a year and she is not due until November.     Attempted to contact pt to inform her of this, no answer, was not able to leave message, due to phone kept ringing

## 2019-04-02 NOTE — TELEPHONE ENCOUNTER
----- Message from Chelsea Hurley sent at 2019  2:57 PM CDT -----  Contact: Self  eJssi Linton  MRN: [id number]  : 1965  PCP: David Allred  Home Phone      [phone]  Work Phone      Not on file.  Mobile          [phone]      MESSAGE:   Patient ss returning a call that she received, please return call @ [phone]

## 2019-04-02 NOTE — TELEPHONE ENCOUNTER
----- Message from Brandee Avilez sent at 2019  8:47 AM CDT -----  Contact: Self  Jessi Linton  MRN: [id number]  : 1965  PCP: David Allred  Home Phone      [phone]  Work Phone      Not on file.  Mobile          [phone]      MESSAGE:   Patient requesting orders  Name of test (labs, mammo, x-ray, etc.):  Labs  Where is test to be performed: [facility]  Date of test (if scheduled): before 2019  Reason (be specific):  Annual  Current Insurance: [employer]  Comments:      Phone: [phone]

## 2019-04-23 DIAGNOSIS — E89.40 SURGICAL MENOPAUSE: ICD-10-CM

## 2019-04-28 RX ORDER — ESTROGENS, CONJUGATED 0.45 MG/1
TABLET, FILM COATED ORAL
Qty: 30 TABLET | Refills: 5 | Status: SHIPPED | OUTPATIENT
Start: 2019-04-28 | End: 2019-05-20

## 2019-05-20 ENCOUNTER — OFFICE VISIT (OUTPATIENT)
Dept: FAMILY MEDICINE | Facility: CLINIC | Age: 54
End: 2019-05-20
Payer: MEDICAID

## 2019-05-20 VITALS
OXYGEN SATURATION: 98 % | HEART RATE: 74 BPM | SYSTOLIC BLOOD PRESSURE: 122 MMHG | RESPIRATION RATE: 10 BRPM | HEIGHT: 55 IN | DIASTOLIC BLOOD PRESSURE: 80 MMHG | WEIGHT: 220 LBS | BODY MASS INDEX: 50.91 KG/M2

## 2019-05-20 DIAGNOSIS — N32.81 OAB (OVERACTIVE BLADDER): ICD-10-CM

## 2019-05-20 DIAGNOSIS — E66.01 CLASS 3 SEVERE OBESITY DUE TO EXCESS CALORIES WITHOUT SERIOUS COMORBIDITY WITH BODY MASS INDEX (BMI) OF 40.0 TO 44.9 IN ADULT: Primary | ICD-10-CM

## 2019-05-20 DIAGNOSIS — E89.40 SURGICAL MENOPAUSE: ICD-10-CM

## 2019-05-20 DIAGNOSIS — K21.9 GASTROESOPHAGEAL REFLUX DISEASE WITHOUT ESOPHAGITIS: ICD-10-CM

## 2019-05-20 DIAGNOSIS — R73.02 GLUCOSE INTOLERANCE (IMPAIRED GLUCOSE TOLERANCE): ICD-10-CM

## 2019-05-20 DIAGNOSIS — G47.33 OSA (OBSTRUCTIVE SLEEP APNEA): ICD-10-CM

## 2019-05-20 DIAGNOSIS — N32.81 OVERACTIVE BLADDER: ICD-10-CM

## 2019-05-20 LAB
ALBUMIN SERPL BCP-MCNC: 3.5 G/DL (ref 3.5–5.2)
ALP SERPL-CCNC: 64 U/L (ref 55–135)
ALT SERPL W/O P-5'-P-CCNC: 28 U/L (ref 10–44)
ANION GAP SERPL CALC-SCNC: 7 MMOL/L (ref 8–16)
AST SERPL-CCNC: 18 U/L (ref 10–40)
BILIRUB SERPL-MCNC: 0.3 MG/DL (ref 0.1–1)
BUN SERPL-MCNC: 20 MG/DL (ref 6–20)
CALCIUM SERPL-MCNC: 9.9 MG/DL (ref 8.7–10.5)
CHLORIDE SERPL-SCNC: 103 MMOL/L (ref 95–110)
CO2 SERPL-SCNC: 30 MMOL/L (ref 23–29)
CREAT SERPL-MCNC: 0.8 MG/DL (ref 0.5–1.4)
EST. GFR  (AFRICAN AMERICAN): >60 ML/MIN/1.73 M^2
EST. GFR  (NON AFRICAN AMERICAN): >60 ML/MIN/1.73 M^2
GLUCOSE SERPL-MCNC: 112 MG/DL (ref 70–110)
POTASSIUM SERPL-SCNC: 4.2 MMOL/L (ref 3.5–5.1)
PROT SERPL-MCNC: 7.2 G/DL (ref 6–8.4)
SODIUM SERPL-SCNC: 140 MMOL/L (ref 136–145)

## 2019-05-20 PROCEDURE — 99213 OFFICE O/P EST LOW 20 MIN: CPT | Mod: PBBFAC | Performed by: FAMILY MEDICINE

## 2019-05-20 PROCEDURE — 99999 PR PBB SHADOW E&M-EST. PATIENT-LVL III: ICD-10-PCS | Mod: PBBFAC,,, | Performed by: FAMILY MEDICINE

## 2019-05-20 PROCEDURE — 99214 OFFICE O/P EST MOD 30 MIN: CPT | Mod: S$PBB,,, | Performed by: FAMILY MEDICINE

## 2019-05-20 PROCEDURE — 99214 PR OFFICE/OUTPT VISIT, EST, LEVL IV, 30-39 MIN: ICD-10-PCS | Mod: S$PBB,,, | Performed by: FAMILY MEDICINE

## 2019-05-20 PROCEDURE — 36415 COLL VENOUS BLD VENIPUNCTURE: CPT | Mod: PBBFAC

## 2019-05-20 PROCEDURE — 80053 COMPREHEN METABOLIC PANEL: CPT

## 2019-05-20 PROCEDURE — 83036 HEMOGLOBIN GLYCOSYLATED A1C: CPT

## 2019-05-20 PROCEDURE — 99999 PR PBB SHADOW E&M-EST. PATIENT-LVL III: CPT | Mod: PBBFAC,,, | Performed by: FAMILY MEDICINE

## 2019-05-20 RX ORDER — OXYBUTYNIN CHLORIDE 5 MG/1
5 TABLET, EXTENDED RELEASE ORAL DAILY
Qty: 30 TABLET | Refills: 4 | Status: SHIPPED | OUTPATIENT
Start: 2019-05-20 | End: 2019-06-21 | Stop reason: SDUPTHER

## 2019-05-20 RX ORDER — OXYBUTYNIN CHLORIDE 15 MG/1
15 TABLET, EXTENDED RELEASE ORAL DAILY
Qty: 30 TABLET | Refills: 5 | Status: SHIPPED | OUTPATIENT
Start: 2019-05-20 | End: 2019-12-14 | Stop reason: SDUPTHER

## 2019-05-20 NOTE — PROGRESS NOTES
Subjective:       Patient ID: Jessi Linton is a 53 y.o. female.    Chief Complaint: Follow-up (6 month follow up)    Pt is a 53 y.o. female who presents for evaluation and management of   Encounter Diagnoses   Name Primary?    Class 3 severe obesity due to excess calories without serious comorbidity with body mass index (BMI) of 40.0 to 44.9 in adult Yes    MAME (obstructive sleep apnea)     Glucose intolerance (impaired glucose tolerance)     Gastroesophageal reflux disease without esophagitis     OAB (overactive bladder)    .  Doing well on current meds. Denies any side effects. Prevention is up to date.    Review of Systems   Constitutional: Negative for chills and fever.   Respiratory: Negative for shortness of breath.    Cardiovascular: Negative for chest pain and palpitations.   Gastrointestinal: Negative for abdominal pain, blood in stool, constipation and nausea.   Genitourinary: Negative for difficulty urinating.   Psychiatric/Behavioral: Negative for dysphoric mood, sleep disturbance and suicidal ideas. The patient is not nervous/anxious.        Objective:      Physical Exam   Constitutional: She is oriented to person, place, and time. She appears well-developed and well-nourished.   Obese    HENT:   Head: Normocephalic and atraumatic.   Right Ear: External ear normal.   Left Ear: External ear normal.   Nose: Nose normal.   Mouth/Throat: Oropharynx is clear and moist.   Eyes: Pupils are equal, round, and reactive to light. EOM are normal.   Neck: Normal range of motion. Neck supple. No JVD present. No tracheal deviation present. No thyromegaly present.   Cardiovascular: Normal rate, normal heart sounds and intact distal pulses.   No murmur heard.  Pulmonary/Chest: Effort normal and breath sounds normal. No respiratory distress. She has no wheezes. She has no rales. She exhibits no tenderness.   Abdominal: Soft. Bowel sounds are normal. She exhibits no distension and no mass. There is no  tenderness. There is no rebound and no guarding.   Musculoskeletal: Normal range of motion. She exhibits no edema or tenderness.   Lymphadenopathy:     She has no cervical adenopathy.   Neurological: She is alert and oriented to person, place, and time. She has normal reflexes. She displays normal reflexes. No cranial nerve deficit. She exhibits normal muscle tone. Coordination normal.   Skin: Skin is warm and dry. No rash noted. No erythema. No pallor.   Psychiatric: She has a normal mood and affect. Her behavior is normal. Judgment and thought content normal.        Assessment:       1. Class 3 severe obesity due to excess calories without serious comorbidity with body mass index (BMI) of 40.0 to 44.9 in adult    2. MAME (obstructive sleep apnea)    3. Glucose intolerance (impaired glucose tolerance)    4. Gastroesophageal reflux disease without esophagitis    5. OAB (overactive bladder)        Plan:   Jessi Conway was seen today for follow-up.    Diagnoses and all orders for this visit:    Class 3 severe obesity due to excess calories without serious comorbidity with body mass index (BMI) of 40.0 to 44.9 in adult    MAME (obstructive sleep apnea)    Glucose intolerance (impaired glucose tolerance)  -     Comprehensive metabolic panel; Future  -     Hemoglobin A1c; Future    Gastroesophageal reflux disease without esophagitis    OAB (overactive bladder)  -     oxybutynin (DITROPAN XL) 15 MG TR24; Take 1 tablet (15 mg total) by mouth once daily.    Surgical menopause  -     estrogens, conjugated, (PREMARIN) 0.3 MG tablet; Take 1 tablet (0.3 mg total) by mouth once daily.    Overactive bladder  -     oxybutynin (DITROPAN-XL) 5 MG TR24; Take 1 tablet (5 mg total) by mouth once daily.      Problem List Items Addressed This Visit     GERD (gastroesophageal reflux disease)    Glucose intolerance (impaired glucose tolerance)    Relevant Orders    Comprehensive metabolic panel    Hemoglobin A1c    OAB (overactive bladder)     Obesity - Primary    MAME (obstructive sleep apnea)        Using and benefiting from cpap     Lose weight       No follow-ups on file.

## 2019-05-21 LAB
ESTIMATED AVG GLUCOSE: 103 MG/DL (ref 68–131)
HBA1C MFR BLD HPLC: 5.2 % (ref 4–5.6)

## 2019-06-21 ENCOUNTER — OFFICE VISIT (OUTPATIENT)
Dept: INTERNAL MEDICINE | Facility: CLINIC | Age: 54
End: 2019-06-21
Payer: MEDICAID

## 2019-06-21 VITALS
HEART RATE: 75 BPM | WEIGHT: 220 LBS | DIASTOLIC BLOOD PRESSURE: 86 MMHG | RESPIRATION RATE: 18 BRPM | SYSTOLIC BLOOD PRESSURE: 124 MMHG | HEIGHT: 55 IN | BODY MASS INDEX: 50.91 KG/M2 | OXYGEN SATURATION: 96 %

## 2019-06-21 DIAGNOSIS — M25.512 ACUTE PAIN OF LEFT SHOULDER: Primary | ICD-10-CM

## 2019-06-21 PROCEDURE — 99999 PR PBB SHADOW E&M-EST. PATIENT-LVL III: ICD-10-PCS | Mod: PBBFAC,,, | Performed by: NURSE PRACTITIONER

## 2019-06-21 PROCEDURE — 99999 PR PBB SHADOW E&M-EST. PATIENT-LVL III: CPT | Mod: PBBFAC,,, | Performed by: NURSE PRACTITIONER

## 2019-06-21 PROCEDURE — 99213 OFFICE O/P EST LOW 20 MIN: CPT | Mod: PBBFAC,PN | Performed by: NURSE PRACTITIONER

## 2019-06-21 PROCEDURE — 99213 PR OFFICE/OUTPT VISIT, EST, LEVL III, 20-29 MIN: ICD-10-PCS | Mod: S$PBB,,, | Performed by: NURSE PRACTITIONER

## 2019-06-21 PROCEDURE — 99213 OFFICE O/P EST LOW 20 MIN: CPT | Mod: S$PBB,,, | Performed by: NURSE PRACTITIONER

## 2019-06-21 RX ORDER — NAPROXEN 500 MG/1
500 TABLET ORAL 2 TIMES DAILY WITH MEALS
Qty: 60 TABLET | Refills: 0 | Status: SHIPPED | OUTPATIENT
Start: 2019-06-21 | End: 2019-06-28

## 2019-06-21 RX ORDER — KETOROLAC TROMETHAMINE 30 MG/ML
60 INJECTION, SOLUTION INTRAMUSCULAR; INTRAVENOUS ONCE
Status: COMPLETED | OUTPATIENT
Start: 2019-06-21 | End: 2019-06-21

## 2019-06-21 RX ORDER — METHYLPREDNISOLONE ACETATE 80 MG/ML
80 INJECTION, SUSPENSION INTRA-ARTICULAR; INTRALESIONAL; INTRAMUSCULAR; SOFT TISSUE ONCE
Status: COMPLETED | OUTPATIENT
Start: 2019-06-21 | End: 2019-06-21

## 2019-06-21 RX ADMIN — KETOROLAC TROMETHAMINE 60 MG: 60 INJECTION, SOLUTION INTRAMUSCULAR at 02:06

## 2019-06-21 RX ADMIN — METHYLPREDNISOLONE ACETATE 80 MG: 80 INJECTION, SUSPENSION INTRA-ARTICULAR; INTRALESIONAL; INTRAMUSCULAR; SOFT TISSUE at 02:06

## 2019-06-21 NOTE — PROGRESS NOTES
Subjective:       Patient ID: Jessi Linton is a 53 y.o. female.    Chief Complaint: Shoulder Pain (L. side - x 3 days PS:8)    Patient is unknown, to me and presents with   Chief Complaint   Patient presents with    Shoulder Pain     L. side - x 3 days PS:8   .  Denies chest pain and shortness of breath.  Patient who is new to me presents with three day hx of left shoulder pain. She was volunteering for [employer] and did a lot of activities and since than she started with left shoulder pain.    HPI  Review of Systems   Constitutional: Negative for activity change, appetite change, fatigue, fever and unexpected weight change.   HENT: Negative for congestion, ear discharge, ear pain, hearing loss, postnasal drip and tinnitus.    Eyes: Negative for photophobia, pain and visual disturbance.   Respiratory: Negative for cough, shortness of breath, wheezing and stridor.    Cardiovascular: Negative for chest pain, palpitations and leg swelling.   Gastrointestinal: Negative for abdominal distention.   Genitourinary: Negative for difficulty urinating, dysuria, frequency, hematuria and urgency.   Musculoskeletal: Positive for arthralgias. Negative for back pain, gait problem, joint swelling, myalgias, neck pain and neck stiffness.   Skin: Negative.    Neurological: Negative for dizziness, seizures, syncope, weakness, light-headedness, numbness and headaches.   Hematological: Negative for adenopathy. Does not bruise/bleed easily.   Psychiatric/Behavioral: Negative for behavioral problems, confusion and hallucinations.       Objective:      Physical Exam   Constitutional: She is oriented to person, place, and time. She appears well-developed and well-nourished. No distress.   HENT:   Head: Normocephalic and atraumatic.   Right Ear: External ear normal.   Left Ear: External ear normal.   Mouth/Throat: Oropharynx is clear and moist. No oropharyngeal exudate.   Eyes: Pupils are equal, round, and reactive to light. Conjunctivae and  "EOM are normal. Right eye exhibits no discharge. Left eye exhibits no discharge.   Neck: Normal range of motion. Neck supple. No JVD present. No thyromegaly present.   Cardiovascular: Normal rate, regular rhythm, normal heart sounds and intact distal pulses. Exam reveals no gallop and no friction rub.   No murmur heard.  Pulmonary/Chest: Effort normal and breath sounds normal. No stridor. No respiratory distress. She has no wheezes. She has no rales. She exhibits no tenderness.   Abdominal: Soft. Bowel sounds are normal. She exhibits no distension and no mass. There is no tenderness. There is no rebound.   Musculoskeletal: She exhibits tenderness. She exhibits no edema.        Left shoulder: She exhibits decreased range of motion, tenderness and pain.        Arms:  difficult to bring shoulder over head to left side   Lymphadenopathy:     She has no cervical adenopathy.   Neurological: She is alert and oriented to person, place, and time. She has normal reflexes. She displays normal reflexes. No cranial nerve deficit. She exhibits normal muscle tone. Coordination normal.   Skin: Skin is warm and dry. No rash noted. No erythema. No pallor.   Psychiatric: She has a normal mood and affect. Her behavior is normal. Judgment and thought content normal.       Assessment:       1. Acute pain of left shoulder        Plan:   Jessi Conway was seen today for shoulder pain.    Diagnoses and all orders for this visit:    Acute pain of left shoulder  -     methylPREDNISolone acetate injection 80 mg  -     ketorolac injection 60 mg  -     naproxen (NAPROSYN) 500 MG tablet; Take 1 tablet (500 mg total) by mouth 2 (two) times daily with meals.    "This note will not be shared with the patient."  Alternate ice and heat  Take naprosyn with food  May need PT and imaging if no improvement   rtc in one week  "

## 2019-06-21 NOTE — PATIENT INSTRUCTIONS
The Shoulder Joint    The shoulder is made up of bones, muscles, ligaments, and tendons. They work together so you can reach, swing, and lift in comfort. Learning about the parts of the shoulder joint will help you to understand your shoulder problem.  The parts of the joint  The shoulder joint is where the humerus (upper arm bone) meets the scapula (shoulder blade):  · Muscles and ligaments help make up the joint. They attach to the shoulder blade and upper arm bone.  · At the top of the shoulder blade are two bony knobs called the acromion and coracoid process.  · The subacromial space is between the top of the humerus and the acromion. This space is filled with tendons, muscles, and the subacromial bursa.  · The bursa is a sac of fluid that cushions shoulder parts as they move.  The supraspinatus muscle and tendon are located in the subacromial space. They help form the rotator cuff and are commonly injured in a rotator cuff tear.  Date Last Reviewed: 10/12/2015  © 2964-4059 [employer]. [address] [place] [address], [place] [address]. All rights reserved. This information is not intended as a substitute for professional medical care. Always follow your healthcare professional's instructions.

## 2019-06-24 ENCOUNTER — TELEPHONE (OUTPATIENT)
Dept: INTERNAL MEDICINE | Facility: CLINIC | Age: 54
End: 2019-06-24

## 2019-06-24 ENCOUNTER — HOSPITAL ENCOUNTER (OUTPATIENT)
Dept: RADIOLOGY | Facility: HOSPITAL | Age: 54
Discharge: HOME | End: 2019-06-24
Attending: NURSE PRACTITIONER
Payer: MEDICAID

## 2019-06-24 DIAGNOSIS — M25.512 ACUTE PAIN OF LEFT SHOULDER: ICD-10-CM

## 2019-06-24 DIAGNOSIS — M25.512 ACUTE PAIN OF LEFT SHOULDER: Primary | ICD-10-CM

## 2019-06-24 DIAGNOSIS — G89.29 CHRONIC LEFT SHOULDER PAIN: Primary | ICD-10-CM

## 2019-06-24 DIAGNOSIS — M25.512 CHRONIC LEFT SHOULDER PAIN: Primary | ICD-10-CM

## 2019-06-24 PROCEDURE — 73030 X-RAY EXAM OF SHOULDER: CPT | Mod: TC,LT

## 2019-06-24 PROCEDURE — 73030 XR SHOULDER COMPLETE 2 OR MORE VIEWS LEFT: ICD-10-PCS | Mod: 26,LT,, | Performed by: RADIOLOGY

## 2019-06-24 PROCEDURE — 73030 X-RAY EXAM OF SHOULDER: CPT | Mod: 26,LT,, | Performed by: RADIOLOGY

## 2019-06-24 NOTE — TELEPHONE ENCOUNTER
----- Message from Paige Grover MA sent at 6/24/2019  7:38 AM CDT -----  Contact: Patient  Patient c/o shoulder pain really bad, not getting better at all,   Patient wants xray.     Please Advise--[phone]

## 2019-06-24 NOTE — TELEPHONE ENCOUNTER
Pt called back stating she is willing to go the ortho route so needs referral put in for this and will see where she can get seen  please advise  Thanks!

## 2019-06-28 ENCOUNTER — OFFICE VISIT (OUTPATIENT)
Dept: INTERNAL MEDICINE | Facility: CLINIC | Age: 54
End: 2019-06-28
Payer: MEDICAID

## 2019-06-28 VITALS
WEIGHT: 221.56 LBS | HEART RATE: 62 BPM | SYSTOLIC BLOOD PRESSURE: 160 MMHG | BODY MASS INDEX: 51.28 KG/M2 | HEIGHT: 55 IN | DIASTOLIC BLOOD PRESSURE: 90 MMHG | RESPIRATION RATE: 16 BRPM

## 2019-06-28 DIAGNOSIS — G89.29 CHRONIC PAIN IN LEFT SHOULDER: Primary | ICD-10-CM

## 2019-06-28 DIAGNOSIS — M19.019 AC JOINT ARTHROPATHY: ICD-10-CM

## 2019-06-28 DIAGNOSIS — R03.0 ELEVATED BLOOD PRESSURE, SITUATIONAL: ICD-10-CM

## 2019-06-28 DIAGNOSIS — M25.512 CHRONIC PAIN IN LEFT SHOULDER: Primary | ICD-10-CM

## 2019-06-28 PROCEDURE — 99999 PR PBB SHADOW E&M-EST. PATIENT-LVL III: ICD-10-PCS | Mod: PBBFAC,,, | Performed by: NURSE PRACTITIONER

## 2019-06-28 PROCEDURE — 99213 PR OFFICE/OUTPT VISIT, EST, LEVL III, 20-29 MIN: ICD-10-PCS | Mod: S$PBB,,, | Performed by: NURSE PRACTITIONER

## 2019-06-28 PROCEDURE — 99999 PR PBB SHADOW E&M-EST. PATIENT-LVL III: CPT | Mod: PBBFAC,,, | Performed by: NURSE PRACTITIONER

## 2019-06-28 PROCEDURE — 99213 OFFICE O/P EST LOW 20 MIN: CPT | Mod: PBBFAC,PN | Performed by: NURSE PRACTITIONER

## 2019-06-28 PROCEDURE — 99213 OFFICE O/P EST LOW 20 MIN: CPT | Mod: S$PBB,,, | Performed by: NURSE PRACTITIONER

## 2019-06-28 RX ORDER — TRAMADOL HYDROCHLORIDE 50 MG/1
50 TABLET ORAL EVERY 6 HOURS
Qty: 28 TABLET | Refills: 0 | Status: SHIPPED | OUTPATIENT
Start: 2019-06-28 | End: 2019-07-01

## 2019-06-28 NOTE — PROGRESS NOTES
Subjective:       Patient ID: Jessi Linton is a 53 y.o. female.    Chief Complaint: Follow-up    Patient is known, to me and presents with   Chief Complaint   Patient presents with    Follow-up   .  Denies chest pain and shortness of breath.  Patient presents with left shoulder pain follow up and is not improved. Taking the naprosyn with no relief. Tenderness and limited mobility with left shoulder. Mother would like to have MRI done prior to starting PT.   HPI  Review of Systems   Constitutional: Negative.    Respiratory: Negative.    Cardiovascular: Negative.    Musculoskeletal: Positive for arthralgias.   Skin: Negative.    Neurological: Negative.        Objective:      Physical Exam   Constitutional: She is oriented to person, place, and time. She appears well-developed and well-nourished. No distress.   Neck: Normal range of motion. Neck supple. No JVD present. No tracheal deviation present. No thyromegaly present.   Cardiovascular: Normal rate, regular rhythm and normal heart sounds.   No murmur heard.  Pulmonary/Chest: Effort normal and breath sounds normal. She has no wheezes.   Musculoskeletal: She exhibits tenderness. She exhibits no edema or deformity.        Left shoulder: She exhibits decreased range of motion, tenderness and pain.        Arms:  Limited mobility due to pain.    Lymphadenopathy:     She has no cervical adenopathy.   Neurological: She is alert and oriented to person, place, and time. She displays normal reflexes. No cranial nerve deficit or sensory deficit. She exhibits normal muscle tone. Coordination normal.   Skin: Skin is warm and dry. Capillary refill takes less than 2 seconds. No rash noted. She is not diaphoretic. No erythema. No pallor.       Assessment:       1. Chronic pain in left shoulder    2. AC joint arthropathy    3. Elevated blood pressure, situational        Plan:   Jessi Conway was seen today for follow-up.    Diagnoses and all orders for this visit:    Chronic  "pain in left shoulder  -     traMADol (ULTRAM) 50 mg tablet; Take 1 tablet (50 mg total) by mouth every 6 (six) hours. for 7 days  -     MRI Shoulder Without Contrast Left; Future    AC joint arthropathy  -     traMADol (ULTRAM) 50 mg tablet; Take 1 tablet (50 mg total) by mouth every 6 (six) hours. for 7 days  -     MRI Shoulder Without Contrast Left; Future    Elevated blood pressure, situational    "This note will not be shared with the patient."  Will place on tramadol prn for now  Once I review MRI will determine what route to go as far as ortho or PT and or both  Also monitor blood pressure and log   rtc as scheduled   "

## 2019-07-01 ENCOUNTER — TELEPHONE (OUTPATIENT)
Dept: INTERNAL MEDICINE | Facility: CLINIC | Age: 54
End: 2019-07-01

## 2019-07-01 RX ORDER — IBUPROFEN 800 MG/1
800 TABLET ORAL 3 TIMES DAILY PRN
Qty: 90 TABLET | Refills: 0 | Status: SHIPPED | OUTPATIENT
Start: 2019-07-01 | End: 2019-07-26 | Stop reason: SDUPTHER

## 2019-07-01 NOTE — TELEPHONE ENCOUNTER
Pt called stating she broke out in a rash on her chest with itching since starting the tramadol wondering what she needs to do and if she stops it what else can be sent in for her to take for shoulder pain  Please advise  Thanks!

## 2019-07-02 ENCOUNTER — HOSPITAL ENCOUNTER (OUTPATIENT)
Dept: RADIOLOGY | Facility: HOSPITAL | Age: 54
Discharge: HOME | End: 2019-07-02
Attending: NURSE PRACTITIONER
Payer: MEDICAID

## 2019-07-02 DIAGNOSIS — M25.512 CHRONIC PAIN IN LEFT SHOULDER: ICD-10-CM

## 2019-07-02 DIAGNOSIS — G89.29 CHRONIC PAIN IN LEFT SHOULDER: ICD-10-CM

## 2019-07-02 DIAGNOSIS — M19.019 AC JOINT ARTHROPATHY: ICD-10-CM

## 2019-07-02 PROCEDURE — 73221 MRI JOINT UPR EXTREM W/O DYE: CPT | Mod: TC,LT

## 2019-07-05 ENCOUNTER — TELEPHONE (OUTPATIENT)
Dept: INTERNAL MEDICINE | Facility: CLINIC | Age: 54
End: 2019-07-05

## 2019-07-05 NOTE — TELEPHONE ENCOUNTER
Spoke with pt regarding results, voiced understanding she mentioned she spoke to her insurance who gave her  office as well so called them and I faxed everything over there to them who will review her images and notes and let us know from there if they will see her

## 2019-07-05 NOTE — TELEPHONE ENCOUNTER
----- Message from Paige Grover MA sent at 7/5/2019  8:05 AM CDT -----  Contact: Patient  Patient calling for results of her MRI.     Patient states her arm is not straight,  Its shorter than the other.   Please Advise--[phone]

## 2019-07-05 NOTE — TELEPHONE ENCOUNTER
She has mild tendinitis in the shoulder. Also does have mild instability which could cause shortening of one arm. I would suggest ortho but problem will be to get her in. we can refer to [facility] and send in imaging report.

## 2019-07-09 ENCOUNTER — TELEPHONE (OUTPATIENT)
Dept: FAMILY MEDICINE | Facility: CLINIC | Age: 54
End: 2019-07-09

## 2019-07-09 NOTE — TELEPHONE ENCOUNTER
----- Message from Ephraim Cowart sent at 2019 11:31 AM CDT -----  Contact: Patient & mom Kassandra Linton  MRN: [id number]  : 1965  PCP: David Allred  Home Phone      [phone]  Work Phone      Not on file.  Mobile          [phone]      MESSAGE: since  Jessi has been seeing J[facility] Duenas for a shoulder problem (Brig[facility] was not available @ onset of problem) -- arm bends @ elbow & is shrinking (now shorter than her other arm) -- has had x-ray & MRI -- being referred to Orthopedic -- problem finding one to take medicaid - case is under review by Dr Flanagan -- would like Dr Allred to review her chart & advise them what to do if Dr Flanagan does not accept her -- mom has concerns (upset)      Call Jessi @ [phone] or Kassandra @ [phone]    PCP: Brigida

## 2019-07-11 RX ORDER — MONTELUKAST SODIUM 10 MG/1
TABLET ORAL
Qty: 30 TABLET | Refills: 5 | Status: SHIPPED | OUTPATIENT
Start: 2019-07-11 | End: 2019-12-30

## 2019-07-18 ENCOUNTER — OFFICE VISIT (OUTPATIENT)
Dept: FAMILY MEDICINE | Facility: CLINIC | Age: 54
End: 2019-07-18
Payer: MEDICAID

## 2019-07-18 VITALS
SYSTOLIC BLOOD PRESSURE: 132 MMHG | WEIGHT: 219 LBS | DIASTOLIC BLOOD PRESSURE: 70 MMHG | HEART RATE: 66 BPM | HEIGHT: 55 IN | BODY MASS INDEX: 50.68 KG/M2 | OXYGEN SATURATION: 98 % | RESPIRATION RATE: 16 BRPM

## 2019-07-18 DIAGNOSIS — E66.01 MORBID OBESITY: ICD-10-CM

## 2019-07-18 DIAGNOSIS — M25.512 ACUTE PAIN OF LEFT SHOULDER: Primary | ICD-10-CM

## 2019-07-18 DIAGNOSIS — M25.512 ACUTE PAIN OF LEFT SHOULDER: ICD-10-CM

## 2019-07-18 DIAGNOSIS — M67.912 ROTATOR CUFF DISORDER, LEFT: ICD-10-CM

## 2019-07-18 PROCEDURE — 99999 PR PBB SHADOW E&M-EST. PATIENT-LVL V: CPT | Mod: PBBFAC,,, | Performed by: FAMILY MEDICINE

## 2019-07-18 PROCEDURE — 99999 PR PBB SHADOW E&M-EST. PATIENT-LVL V: ICD-10-PCS | Mod: PBBFAC,,, | Performed by: FAMILY MEDICINE

## 2019-07-18 PROCEDURE — 99215 OFFICE O/P EST HI 40 MIN: CPT | Mod: PBBFAC | Performed by: FAMILY MEDICINE

## 2019-07-18 PROCEDURE — 99213 OFFICE O/P EST LOW 20 MIN: CPT | Mod: S$PBB,,, | Performed by: FAMILY MEDICINE

## 2019-07-18 PROCEDURE — 99213 PR OFFICE/OUTPT VISIT, EST, LEVL III, 20-29 MIN: ICD-10-PCS | Mod: S$PBB,,, | Performed by: FAMILY MEDICINE

## 2019-07-18 RX ORDER — MELOXICAM 15 MG/1
15 TABLET ORAL DAILY
COMMUNITY
Start: 2019-07-17

## 2019-07-18 RX ORDER — NAPROXEN 500 MG/1
TABLET ORAL
Qty: 60 TABLET | Refills: 0 | Status: SHIPPED | OUTPATIENT
Start: 2019-07-18

## 2019-07-18 NOTE — PROGRESS NOTES
Subjective:       Patient ID: Jessi Linton is a 53 y.o. female.    Chief Complaint: Shoulder Pain (continues with same pain) and Shortness of Breath (with exertion)    Pt is a 53 y.o. female who presents for evaluation and management of   Encounter Diagnoses   Name Primary?    Acute pain of left shoulder Yes    Rotator cuff disorder, left     Morbid obesity    .MRI with tendonosis   No full tear   Saw [facility] PT   She has medicaid     Doing well on current meds. Denies any side effects. Prevention is up to date.  Review of Systems   Musculoskeletal: Positive for arthralgias.       Objective:      Physical Exam   Constitutional: She is oriented to person, place, and time. She appears well-developed and well-nourished.   HENT:   Head: Normocephalic and atraumatic.   Right Ear: External ear normal.   Left Ear: External ear normal.   Nose: Nose normal.   Mouth/Throat: Oropharynx is clear and moist.   Eyes: Pupils are equal, round, and reactive to light. EOM are normal.   Neck: Normal range of motion. Neck supple. No JVD present. No tracheal deviation present. No thyromegaly present.   Cardiovascular: Normal rate, normal heart sounds and intact distal pulses.   No murmur heard.  Pulmonary/Chest: Effort normal and breath sounds normal. No respiratory distress. She has no wheezes. She has no rales. She exhibits no tenderness.   Abdominal: Soft. Bowel sounds are normal. She exhibits no distension and no mass. There is no tenderness. There is no rebound and no guarding.   Musculoskeletal: Normal range of motion. She exhibits no edema or tenderness.   Lymphadenopathy:     She has no cervical adenopathy.   Neurological: She is alert and oriented to person, place, and time. She has normal reflexes. She displays normal reflexes. No cranial nerve deficit. She exhibits normal muscle tone. Coordination normal.   Skin: Skin is warm and dry. No rash noted. No erythema. No pallor.   Psychiatric: She has a normal mood and  affect. Her behavior is normal. Judgment and thought content normal.       Assessment:       1. Acute pain of left shoulder    2. Rotator cuff disorder, left    3. Morbid obesity        Plan:   Jessi Conway was seen today for shoulder pain and shortness of breath.    Diagnoses and all orders for this visit:    Acute pain of left shoulder    Rotator cuff disorder, left  -     Ambulatory Referral to Physical/Occupational Therapy    Morbid obesity      Problem List Items Addressed This Visit     None      Visit Diagnoses     Acute pain of left shoulder    -  Primary    Rotator cuff disorder, left        Relevant Orders    Ambulatory Referral to Physical/Occupational Therapy    Morbid obesity          The patient is asked to make an attempt to improve diet and exercise patterns to aid in medical management of this problem.  5 small meals a day. Cut out white carbs: bread, rice, pasta, potatoes. Exercise/walk 5x/week for at least 30 minutes  .    No follow-ups on file.

## 2019-07-26 RX ORDER — IBUPROFEN 800 MG/1
800 TABLET ORAL 3 TIMES DAILY PRN
Qty: 90 TABLET | Refills: 1 | Status: SHIPPED | OUTPATIENT
Start: 2019-07-26

## 2019-09-04 ENCOUNTER — TELEPHONE (OUTPATIENT)
Dept: FAMILY MEDICINE | Facility: CLINIC | Age: 54
End: 2019-09-04

## 2019-09-04 NOTE — TELEPHONE ENCOUNTER
----- Message from Aliya Gomez sent at 2019  8:09 AM CDT -----  Contact: self  Jessi Linton  MRN: [id number]  : 1965  PCP: David Machado  Home Phone      [phone]  Work Phone      Not on file.  Mobile          [phone]      MESSAGE:   Pt requests a sooner appointment than the  can schedule.  Does patient feel like they need to be seen today:  yes  What is the nature of the appointment:  Arm pain, infection on finger  What visit type:  est  Did you check other providers/department schedules for availability:   Only zane machado  Comments: Also Has some other question about her physical therapy     Phone:  [phone]

## 2019-09-04 NOTE — TELEPHONE ENCOUNTER
----- Message from Ephraim Cowart sent at 2019  8:33 AM CDT -----  Contact: Jens - Kassandra Linton  MRN: [id number]  : 1965  PCP: David Allred  Home Phone      [phone]  Work Phone      Not on file.  Mobile          [phone]      MESSAGE: infected finger - shoulder problem, seeing Dr Flanagan & has appt @ [facility] on 19 -- would like Dr Allred to check it out first -- requesting appt with Dr Allred today    Call [phone]    PCP: Brigida

## 2019-09-04 NOTE — TELEPHONE ENCOUNTER
Spoke to patients mother, [facility] patient was seen per  for shoulder pain. States he ordered to continue physical therapy, he also stated that it could take up to 1-2 years to heal. [facility] patient has slight improvement.    But has also received an appointment for [facility] for 9/11/2019. Would like to know if she should go for a second opinion? Advise    Call [phone]  Kassandra

## 2019-09-04 NOTE — TELEPHONE ENCOUNTER
That's ultimately up to her. I generally trust Dr. Flanagan's opinion. He has a ton of experience in the field and once worked with [facility].

## 2019-09-05 ENCOUNTER — OFFICE VISIT (OUTPATIENT)
Dept: FAMILY MEDICINE | Facility: CLINIC | Age: 54
End: 2019-09-05
Payer: MEDICAID

## 2019-09-05 VITALS
DIASTOLIC BLOOD PRESSURE: 84 MMHG | HEIGHT: 55 IN | HEART RATE: 70 BPM | WEIGHT: 224.63 LBS | RESPIRATION RATE: 18 BRPM | BODY MASS INDEX: 51.98 KG/M2 | OXYGEN SATURATION: 96 % | SYSTOLIC BLOOD PRESSURE: 130 MMHG

## 2019-09-05 DIAGNOSIS — L02.512 ABSCESS OF LEFT MIDDLE FINGER: Primary | ICD-10-CM

## 2019-09-05 PROCEDURE — 99999 PR PBB SHADOW E&M-EST. PATIENT-LVL IV: CPT | Mod: PBBFAC,,, | Performed by: FAMILY MEDICINE

## 2019-09-05 PROCEDURE — 10060 PR DRAIN SKIN ABSCESS SIMPLE: ICD-10-PCS | Mod: S$PBB,,, | Performed by: FAMILY MEDICINE

## 2019-09-05 PROCEDURE — 10060 I&D ABSCESS SIMPLE/SINGLE: CPT | Mod: S$PBB,,, | Performed by: FAMILY MEDICINE

## 2019-09-05 PROCEDURE — 10060 I&D ABSCESS SIMPLE/SINGLE: CPT | Mod: PBBFAC | Performed by: FAMILY MEDICINE

## 2019-09-05 PROCEDURE — 99999 PR PBB SHADOW E&M-EST. PATIENT-LVL IV: ICD-10-PCS | Mod: PBBFAC,,, | Performed by: FAMILY MEDICINE

## 2019-09-05 PROCEDURE — 99214 OFFICE O/P EST MOD 30 MIN: CPT | Mod: PBBFAC,25 | Performed by: FAMILY MEDICINE

## 2019-09-05 PROCEDURE — 99213 PR OFFICE/OUTPT VISIT, EST, LEVL III, 20-29 MIN: ICD-10-PCS | Mod: 25,S$PBB,, | Performed by: FAMILY MEDICINE

## 2019-09-05 PROCEDURE — 99213 OFFICE O/P EST LOW 20 MIN: CPT | Mod: 25,S$PBB,, | Performed by: FAMILY MEDICINE

## 2019-09-05 RX ORDER — SULFAMETHOXAZOLE AND TRIMETHOPRIM 800; 160 MG/1; MG/1
1 TABLET ORAL 2 TIMES DAILY
Qty: 14 TABLET | Refills: 0 | Status: SHIPPED | OUTPATIENT
Start: 2019-09-05 | End: 2019-10-09

## 2019-09-05 NOTE — PROGRESS NOTES
Subjective:       Patient ID: Jessi Linton is a 53 y.o. female.    Chief Complaint: finger infection    Pt is a 53 y.o. female who presents for evaluation and management of   Encounter Diagnosis   Name Primary?    Abscess of left middle finger Yes   .3 days   Fever?  Doing well on current meds. Denies any side effects. Prevention is up to date.    Review of Systems   Constitutional: Negative for fever.   Skin: Positive for color change.       Objective:      Physical Exam   Constitutional: She is oriented to person, place, and time. She appears well-developed and well-nourished.   HENT:   Head: Normocephalic and atraumatic.   Right Ear: External ear normal.   Left Ear: External ear normal.   Nose: Nose normal.   Mouth/Throat: Oropharynx is clear and moist.   Eyes: Pupils are equal, round, and reactive to light. EOM are normal.   Neck: Normal range of motion. Neck supple. No JVD present. No tracheal deviation present. No thyromegaly present.   Cardiovascular: Normal rate, normal heart sounds and intact distal pulses.   No murmur heard.  Pulmonary/Chest: Effort normal and breath sounds normal. No respiratory distress. She has no wheezes. She has no rales. She exhibits no tenderness.   Abdominal: Soft. Bowel sounds are normal. She exhibits no distension and no mass. There is no tenderness. There is no rebound and no guarding.   Musculoskeletal: Normal range of motion. She exhibits no edema or tenderness.   Lymphadenopathy:     She has no cervical adenopathy.   Neurological: She is alert and oriented to person, place, and time. She has normal reflexes. She displays normal reflexes. No cranial nerve deficit. She exhibits normal muscle tone. Coordination normal.   Skin: Skin is warm and dry. No rash noted. There is erythema. No pallor.   Middle finger with paronychia and associated abscess    Psychiatric: She has a normal mood and affect. Her behavior is normal. Judgment and thought content normal.        Assessment:       1. Abscess of left middle finger        Plan:   Jessi Conway was seen today for finger infection.    Diagnoses and all orders for this visit:    Abscess of left middle finger  -     sulfamethoxazole-trimethoprim 800-160mg (BACTRIM DS) 800-160 mg Tab; Take 1 tablet by mouth 2 (two) times daily.      Problem List Items Addressed This Visit     None      Visit Diagnoses     Abscess of left middle finger    -  Primary    Relevant Medications    sulfamethoxazole-trimethoprim 800-160mg (BACTRIM DS) 800-160 mg Tab      I and d with 22 gauge needle under sterile technique. Pus produced     RTC if condition acutely worsens or any other concerns, otherwise RTC as scheduled    No follow-ups on file.

## 2019-10-03 ENCOUNTER — TELEPHONE (OUTPATIENT)
Dept: FAMILY MEDICINE | Facility: CLINIC | Age: 54
End: 2019-10-03

## 2019-10-03 ENCOUNTER — OFFICE VISIT (OUTPATIENT)
Dept: FAMILY MEDICINE | Facility: CLINIC | Age: 54
End: 2019-10-03
Payer: MEDICAID

## 2019-10-03 VITALS
HEART RATE: 68 BPM | DIASTOLIC BLOOD PRESSURE: 84 MMHG | BODY MASS INDEX: 52.71 KG/M2 | HEIGHT: 55 IN | SYSTOLIC BLOOD PRESSURE: 124 MMHG | RESPIRATION RATE: 16 BRPM | OXYGEN SATURATION: 98 % | WEIGHT: 227.75 LBS

## 2019-10-03 DIAGNOSIS — Z23 NEEDS FLU SHOT: ICD-10-CM

## 2019-10-03 DIAGNOSIS — R60.9 EDEMA, UNSPECIFIED TYPE: Primary | ICD-10-CM

## 2019-10-03 LAB
ALBUMIN SERPL BCP-MCNC: 3.6 G/DL (ref 3.5–5.2)
ALP SERPL-CCNC: 59 U/L (ref 55–135)
ALT SERPL W/O P-5'-P-CCNC: 42 U/L (ref 10–44)
ANION GAP SERPL CALC-SCNC: 8 MMOL/L (ref 8–16)
AST SERPL-CCNC: 30 U/L (ref 10–40)
BILIRUB SERPL-MCNC: 0.4 MG/DL (ref 0.1–1)
BNP SERPL-MCNC: 83 PG/ML (ref 0–99)
BUN SERPL-MCNC: 23 MG/DL (ref 6–20)
CALCIUM SERPL-MCNC: 9.6 MG/DL (ref 8.7–10.5)
CHLORIDE SERPL-SCNC: 103 MMOL/L (ref 95–110)
CO2 SERPL-SCNC: 31 MMOL/L (ref 23–29)
CREAT SERPL-MCNC: 0.8 MG/DL (ref 0.5–1.4)
EST. GFR  (AFRICAN AMERICAN): >60 ML/MIN/1.73 M^2
EST. GFR  (NON AFRICAN AMERICAN): >60 ML/MIN/1.73 M^2
GLUCOSE SERPL-MCNC: 124 MG/DL (ref 70–110)
POTASSIUM SERPL-SCNC: 4 MMOL/L (ref 3.5–5.1)
PROT SERPL-MCNC: 6.9 G/DL (ref 6–8.4)
SODIUM SERPL-SCNC: 142 MMOL/L (ref 136–145)
TSH SERPL DL<=0.005 MIU/L-ACNC: 2.54 UIU/ML (ref 0.4–4)

## 2019-10-03 PROCEDURE — 99214 PR OFFICE/OUTPT VISIT, EST, LEVL IV, 30-39 MIN: ICD-10-PCS | Mod: S$PBB,,, | Performed by: FAMILY MEDICINE

## 2019-10-03 PROCEDURE — 90471 IMMUNIZATION ADMIN: CPT | Mod: PBBFAC

## 2019-10-03 PROCEDURE — 80053 COMPREHEN METABOLIC PANEL: CPT

## 2019-10-03 PROCEDURE — 99999 PR PBB SHADOW E&M-EST. PATIENT-LVL IV: ICD-10-PCS | Mod: PBBFAC,,, | Performed by: FAMILY MEDICINE

## 2019-10-03 PROCEDURE — 99214 OFFICE O/P EST MOD 30 MIN: CPT | Mod: S$PBB,,, | Performed by: FAMILY MEDICINE

## 2019-10-03 PROCEDURE — 99999 PR PBB SHADOW E&M-EST. PATIENT-LVL IV: CPT | Mod: PBBFAC,,, | Performed by: FAMILY MEDICINE

## 2019-10-03 PROCEDURE — 36415 COLL VENOUS BLD VENIPUNCTURE: CPT | Mod: PBBFAC

## 2019-10-03 PROCEDURE — 99214 OFFICE O/P EST MOD 30 MIN: CPT | Mod: PBBFAC,25 | Performed by: FAMILY MEDICINE

## 2019-10-03 PROCEDURE — 84443 ASSAY THYROID STIM HORMONE: CPT

## 2019-10-03 PROCEDURE — 83880 ASSAY OF NATRIURETIC PEPTIDE: CPT

## 2019-10-03 RX ORDER — OXYBUTYNIN CHLORIDE 5 MG/1
5 TABLET, EXTENDED RELEASE ORAL DAILY
Refills: 4 | COMMUNITY
Start: 2019-09-12 | End: 2019-11-19 | Stop reason: SDUPTHER

## 2019-10-03 RX ORDER — FUROSEMIDE 20 MG/1
20 TABLET ORAL 2 TIMES DAILY
Qty: 60 TABLET | Refills: 0 | Status: SHIPPED | OUTPATIENT
Start: 2019-10-03 | End: 2019-11-11 | Stop reason: SDUPTHER

## 2019-10-03 NOTE — TELEPHONE ENCOUNTER
Spoke to pt stated that legs are swelling- mom measured legs right was 11 3/4 and left is 12  3/4..right half 17 in and left is 18.5. All on the side where her are is. No pain- walking good. Fever declines.  Declines redness- no pain. Slightly warm. Requesting appt with Dr. Allred  Please advise

## 2019-10-03 NOTE — PROGRESS NOTES
Subjective:       Patient ID: Jessi Linton is a 53 y.o. female.    Chief Complaint: Leg Swelling (L<R)    Pt is a 53 y.o. female who presents for evaluation and management of   Encounter Diagnosis   Name Primary?    Edema, unspecified type Yes   .  Doing well on current meds. Denies any side effects. Prevention is up to date.    Review of Systems   Respiratory: Positive for shortness of breath.    Cardiovascular: Positive for leg swelling. Negative for chest pain.       Objective:      Physical Exam   Constitutional: She is oriented to person, place, and time. She appears well-developed and well-nourished.   Obese      HENT:   Head: Normocephalic and atraumatic.   Right Ear: External ear normal.   Left Ear: External ear normal.   Nose: Nose normal.   Mouth/Throat: Oropharynx is clear and moist.   Eyes: Pupils are equal, round, and reactive to light. EOM are normal.   Neck: Normal range of motion. Neck supple. No JVD present. No tracheal deviation present. No thyromegaly present.   Cardiovascular: Normal rate, normal heart sounds and intact distal pulses.   No murmur heard.  Pulmonary/Chest: Effort normal and breath sounds normal. No respiratory distress. She has no wheezes. She has no rales. She exhibits no tenderness.   Abdominal: Soft. Bowel sounds are normal. She exhibits no distension and no mass. There is no tenderness. There is no rebound and no guarding.   Musculoskeletal: Normal range of motion. She exhibits no edema or tenderness.   Neg Seaman's no calf TTP  No palp cord   2 plus edema bilaterally    Lymphadenopathy:     She has no cervical adenopathy.   Neurological: She is alert and oriented to person, place, and time. She has normal reflexes. She displays normal reflexes. No cranial nerve deficit. She exhibits normal muscle tone. Coordination normal.   Skin: Skin is warm and dry. No rash noted. No erythema. No pallor.   Psychiatric: She has a normal mood and affect. Her behavior is normal.  Judgment and thought content normal.       Assessment:       1. Edema, unspecified type        Plan:   Jessi Conway was seen today for leg swelling.    Diagnoses and all orders for this visit:    Edema, unspecified type  -     Comprehensive metabolic panel; Future  -     TSH; Future  -     Brain natriuretic peptide; Future  -     furosemide (LASIX) 20 MG tablet; Take 1 tablet (20 mg total) by mouth 2 (two) times daily.      Problem List Items Addressed This Visit     None      Visit Diagnoses     Edema, unspecified type    -  Primary    Relevant Medications    furosemide (LASIX) 20 MG tablet    Other Relevant Orders    Comprehensive metabolic panel    TSH    Brain natriuretic peptide        Starting lasix and getting labs   RTC 1 week     No follow-ups on file.

## 2019-10-03 NOTE — TELEPHONE ENCOUNTER
----- Message from Aliya Gomez sent at 10/3/2019  8:13 AM CDT -----  Contact: self  Jessi Linton  MRN: [id number]  : 1965  PCP: David Allred  Home Phone      [phone]  Work Phone      Not on file.  Mobile          [phone]      MESSAGE:   Patient leg calf and ankle is swollen, would like to get a call from nurse.       [phone]

## 2019-10-09 ENCOUNTER — OFFICE VISIT (OUTPATIENT)
Dept: FAMILY MEDICINE | Facility: CLINIC | Age: 54
End: 2019-10-09
Payer: MEDICAID

## 2019-10-09 VITALS
HEIGHT: 55 IN | BODY MASS INDEX: 51.74 KG/M2 | RESPIRATION RATE: 16 BRPM | WEIGHT: 223.56 LBS | SYSTOLIC BLOOD PRESSURE: 118 MMHG | DIASTOLIC BLOOD PRESSURE: 82 MMHG | HEART RATE: 64 BPM

## 2019-10-09 DIAGNOSIS — R60.9 EDEMA, UNSPECIFIED TYPE: Primary | ICD-10-CM

## 2019-10-09 DIAGNOSIS — Z12.39 SCREENING FOR BREAST CANCER: ICD-10-CM

## 2019-10-09 LAB
ANION GAP SERPL CALC-SCNC: 11 MMOL/L (ref 8–16)
BUN SERPL-MCNC: 18 MG/DL (ref 6–20)
CALCIUM SERPL-MCNC: 9.9 MG/DL (ref 8.7–10.5)
CHLORIDE SERPL-SCNC: 100 MMOL/L (ref 95–110)
CO2 SERPL-SCNC: 32 MMOL/L (ref 23–29)
CREAT SERPL-MCNC: 0.9 MG/DL (ref 0.5–1.4)
EST. GFR  (AFRICAN AMERICAN): >60 ML/MIN/1.73 M^2
EST. GFR  (NON AFRICAN AMERICAN): >60 ML/MIN/1.73 M^2
GLUCOSE SERPL-MCNC: 81 MG/DL (ref 70–110)
POTASSIUM SERPL-SCNC: 3.9 MMOL/L (ref 3.5–5.1)
SODIUM SERPL-SCNC: 143 MMOL/L (ref 136–145)

## 2019-10-09 PROCEDURE — 36415 COLL VENOUS BLD VENIPUNCTURE: CPT | Mod: PBBFAC

## 2019-10-09 PROCEDURE — 99999 PR PBB SHADOW E&M-EST. PATIENT-LVL IV: ICD-10-PCS | Mod: PBBFAC,,, | Performed by: FAMILY MEDICINE

## 2019-10-09 PROCEDURE — 99213 OFFICE O/P EST LOW 20 MIN: CPT | Mod: S$PBB,,, | Performed by: FAMILY MEDICINE

## 2019-10-09 PROCEDURE — 99999 PR PBB SHADOW E&M-EST. PATIENT-LVL IV: CPT | Mod: PBBFAC,,, | Performed by: FAMILY MEDICINE

## 2019-10-09 PROCEDURE — 99213 PR OFFICE/OUTPT VISIT, EST, LEVL III, 20-29 MIN: ICD-10-PCS | Mod: S$PBB,,, | Performed by: FAMILY MEDICINE

## 2019-10-09 PROCEDURE — 80048 BASIC METABOLIC PNL TOTAL CA: CPT

## 2019-10-09 PROCEDURE — 99214 OFFICE O/P EST MOD 30 MIN: CPT | Mod: PBBFAC | Performed by: FAMILY MEDICINE

## 2019-10-09 NOTE — PROGRESS NOTES
Subjective:       Patient ID: Jessi Linton is a 53 y.o. female.    Chief Complaint: Follow-up (1 week)    Pt is a 53 y.o. female who presents for evaluation and management of   Encounter Diagnosis   Name Primary?    Edema, unspecified type Yes   .  Doing well on current meds. Denies any side effects. Prevention is up to date.    Review of Systems   Respiratory: Negative for shortness of breath.    Cardiovascular: Positive for leg swelling.   Musculoskeletal:        Shoulders improved   Discharged from PT        Objective:      Physical Exam   Constitutional: She is oriented to person, place, and time. She appears well-developed and well-nourished.   HENT:   Head: Normocephalic and atraumatic.   Right Ear: External ear normal.   Left Ear: External ear normal.   Nose: Nose normal.   Mouth/Throat: Oropharynx is clear and moist.   Eyes: Pupils are equal, round, and reactive to light. EOM are normal.   Neck: Normal range of motion. Neck supple. No JVD present. No tracheal deviation present. No thyromegaly present.   Cardiovascular: Normal rate, normal heart sounds and intact distal pulses.   No murmur heard.  Pulmonary/Chest: Effort normal and breath sounds normal. No respiratory distress. She has no wheezes. She has no rales. She exhibits no tenderness.   Abdominal: Soft. Bowel sounds are normal. She exhibits no distension and no mass. There is no tenderness. There is no rebound and no guarding.   Musculoskeletal: Normal range of motion. She exhibits no edema or tenderness.   Lymphadenopathy:     She has no cervical adenopathy.   Neurological: She is alert and oriented to person, place, and time. She has normal reflexes. She displays normal reflexes. No cranial nerve deficit. She exhibits normal muscle tone. Coordination normal.   Skin: Skin is warm and dry. No rash noted. No erythema. No pallor.   Psychiatric: She has a normal mood and affect. Her behavior is normal. Judgment and thought content normal.        Assessment:       1. Edema, unspecified type        Plan:   Jessi Conway was seen today for follow-up.    Diagnoses and all orders for this visit:    Edema, unspecified type  -     Basic metabolic panel; Future      Problem List Items Addressed This Visit     None      Visit Diagnoses     Edema, unspecified type    -  Primary    Relevant Orders    Basic metabolic panel      continue low dose lasix   Labs today   Lose weight   The patient is asked to make an attempt to improve diet and exercise patterns to aid in medical management of this problem.  5 small meals a day. Cut out white carbs: bread, rice, pasta, potatoes. Exercise/walk 5x/week for at least 30 minutes  .  She is trying nutrisystem     RTC 3 months     No follow-ups on file.

## 2019-10-11 ENCOUNTER — TELEPHONE (OUTPATIENT)
Dept: FAMILY MEDICINE | Facility: CLINIC | Age: 54
End: 2019-10-11

## 2019-10-11 RX ORDER — PROMETHAZINE HYDROCHLORIDE AND DEXTROMETHORPHAN HYDROBROMIDE 6.25; 15 MG/5ML; MG/5ML
5 SYRUP ORAL 3 TIMES DAILY PRN
Qty: 150 ML | Refills: 3 | Status: SHIPPED | OUTPATIENT
Start: 2019-10-11 | End: 2019-10-21

## 2019-10-11 NOTE — TELEPHONE ENCOUNTER
Pt C/O sinus and cough for 1 week. Denies fever.  Pt has been taking Singulair 10 mg once daily, mucinex OTC, and tessalon Rx that was prescribed in the past. Pt has 2 tessalon pills remaining and wants to know if provider can send something in for symptoms.    Please advise.    Phone: [phone]

## 2019-11-06 DIAGNOSIS — R05.9 COUGH: ICD-10-CM

## 2019-11-06 RX ORDER — BENZONATATE 100 MG/1
CAPSULE ORAL
Qty: 30 CAPSULE | Refills: 1 | Status: SHIPPED | OUTPATIENT
Start: 2019-11-06 | End: 2020-12-03 | Stop reason: SDUPTHER

## 2019-11-11 DIAGNOSIS — R60.9 EDEMA, UNSPECIFIED TYPE: ICD-10-CM

## 2019-11-11 RX ORDER — FUROSEMIDE 20 MG/1
20 TABLET ORAL 2 TIMES DAILY
Qty: 60 TABLET | Refills: 0 | Status: SHIPPED | OUTPATIENT
Start: 2019-11-11 | End: 2019-12-30

## 2019-11-11 NOTE — TELEPHONE ENCOUNTER
----- Message from Kisha Richards sent at 2019 12:41 PM CST -----  Contact: Self  Jessi Linton  MRN: [id number]  : 1965  PCP: David Allred  Home Phone      [phone]  Work Phone      Not on file.  Mobile          [phone]      MESSAGE:   Pt requesting refill or new Rx.   Is this a refill or new RX:  refill  RX name and strength: furosemide   Last office visit:   Is this a 30-day or 90-day RX:  30  [facility] [place]  Comments:      Phone:  [phone]

## 2019-11-11 NOTE — TELEPHONE ENCOUNTER
LOV: 10/09/2019    Patient requesting refill on:  1.) Lasix 20mg  Last fill: 10/03/2019 #60 with 0 refills    Ph[facility]acy[facility] [place]

## 2019-11-12 ENCOUNTER — HOSPITAL ENCOUNTER (OUTPATIENT)
Dept: RADIOLOGY | Facility: HOSPITAL | Age: 54
Discharge: HOME | End: 2019-11-12
Attending: FAMILY MEDICINE
Payer: MEDICAID

## 2019-11-12 VITALS — BODY MASS INDEX: 51.61 KG/M2 | HEIGHT: 55 IN | WEIGHT: 223 LBS

## 2019-11-12 DIAGNOSIS — Z12.39 SCREENING FOR BREAST CANCER: ICD-10-CM

## 2019-11-12 PROCEDURE — 77067 SCR MAMMO BI INCL CAD: CPT | Mod: 26,,, | Performed by: RADIOLOGY

## 2019-11-12 PROCEDURE — 77063 BREAST TOMOSYNTHESIS BI: CPT | Mod: 26,,, | Performed by: RADIOLOGY

## 2019-11-12 PROCEDURE — 77067 SCR MAMMO BI INCL CAD: CPT | Mod: TC

## 2019-11-12 PROCEDURE — 77063 MAMMO DIGITAL SCREENING BILAT WITH TOMOSYNTHESIS_CAD: ICD-10-PCS | Mod: 26,,, | Performed by: RADIOLOGY

## 2019-11-12 PROCEDURE — 77067 MAMMO DIGITAL SCREENING BILAT WITH TOMOSYNTHESIS_CAD: ICD-10-PCS | Mod: 26,,, | Performed by: RADIOLOGY

## 2019-11-16 DIAGNOSIS — E89.40 SURGICAL MENOPAUSE: ICD-10-CM

## 2019-11-17 RX ORDER — ESTROGENS, CONJUGATED 0.3 MG/1
TABLET, FILM COATED ORAL
Qty: 30 TABLET | Refills: 5 | Status: SHIPPED | OUTPATIENT
Start: 2019-11-17 | End: 2020-05-10

## 2019-11-18 ENCOUNTER — TELEPHONE (OUTPATIENT)
Dept: FAMILY MEDICINE | Facility: CLINIC | Age: 54
End: 2019-11-18

## 2019-11-18 DIAGNOSIS — R60.9 EDEMA, UNSPECIFIED TYPE: Primary | ICD-10-CM

## 2019-11-18 NOTE — TELEPHONE ENCOUNTER
----- Message from Aliya Gomez sent at 2019  8:23 AM CST -----  Contact: self  Jessi Linton  MRN: [id number]  : 1965  PCP: David Allred  Home Phone      [phone]  Work Phone      Not on file.  Mobile          [phone]      MESSAGE:   Pt requests a sooner appointment than the  can schedule.  Does patient feel like they need to be seen today:  Yes  What is the nature of the appointment:  6 month check up  What visit type:  est  Did you check other providers/department schedules for availability:   Only wants dup  Comments:     Phone:  [phone]

## 2019-11-18 NOTE — TELEPHONE ENCOUNTER
Patient would like to know when she needs to return to clinic, last noted on office visit from 10/2019 to return in 3 months.     Patient states she had started Lasix 20 mg at that visit and is still taking, is she due to return sooner or additional labs?  States she does remain with bilateral lower leg swelling, states slight improvement with fluid pills but not much.     Phone:  [phone]

## 2019-11-19 ENCOUNTER — CLINICAL SUPPORT (OUTPATIENT)
Dept: FAMILY MEDICINE | Facility: CLINIC | Age: 54
End: 2019-11-19
Payer: MEDICAID

## 2019-11-19 DIAGNOSIS — R60.9 EDEMA, UNSPECIFIED TYPE: ICD-10-CM

## 2019-11-19 LAB
ANION GAP SERPL CALC-SCNC: 8 MMOL/L (ref 8–16)
BUN SERPL-MCNC: 16 MG/DL (ref 6–20)
CALCIUM SERPL-MCNC: 9.7 MG/DL (ref 8.7–10.5)
CHLORIDE SERPL-SCNC: 100 MMOL/L (ref 95–110)
CO2 SERPL-SCNC: 33 MMOL/L (ref 23–29)
CREAT SERPL-MCNC: 0.8 MG/DL (ref 0.5–1.4)
EST. GFR  (AFRICAN AMERICAN): >60 ML/MIN/1.73 M^2
EST. GFR  (NON AFRICAN AMERICAN): >60 ML/MIN/1.73 M^2
GLUCOSE SERPL-MCNC: 87 MG/DL (ref 70–110)
POTASSIUM SERPL-SCNC: 3.9 MMOL/L (ref 3.5–5.1)
SODIUM SERPL-SCNC: 141 MMOL/L (ref 136–145)

## 2019-11-19 PROCEDURE — 36415 COLL VENOUS BLD VENIPUNCTURE: CPT | Mod: PBBFAC

## 2019-11-19 PROCEDURE — 80048 BASIC METABOLIC PNL TOTAL CA: CPT

## 2019-11-19 RX ORDER — OXYBUTYNIN CHLORIDE 5 MG/1
5 TABLET, EXTENDED RELEASE ORAL DAILY
Qty: 30 TABLET | Refills: 4 | Status: SHIPPED | OUTPATIENT
Start: 2019-11-19 | End: 2019-12-20 | Stop reason: SDUPTHER

## 2019-11-19 NOTE — TELEPHONE ENCOUNTER
----- Message from Ephraim Cowart sent at 2019  3:25 PM CST -----  Contact: Patient  Jessi Linton  MRN: [id number]  : 1965  PCP: David Allred  Home Phone      [phone]  Work Phone      Not on file.  Mobile          [phone]      MESSAGE:   Pt requesting refill or new Rx.   Is this a refill or new RX:  refills  RX name and strength: Oxybutinin  & Premarin 0.3 mg  Last office visit: 10/9/19  Is this a 30-day or 90-day RX:  30 day  [facility] [place]  Comments:      Phone:  [phone]      PCP: Brigida

## 2019-11-19 NOTE — TELEPHONE ENCOUNTER
LOV:10/09/2019  Please advise        Notified pt that Other RX is at the pharmacy already with refills. Pt verbalized understanding  Can we fill pending one?

## 2019-12-14 DIAGNOSIS — N32.81 OAB (OVERACTIVE BLADDER): ICD-10-CM

## 2019-12-15 RX ORDER — OXYBUTYNIN CHLORIDE 15 MG/1
TABLET, EXTENDED RELEASE ORAL
Qty: 30 TABLET | Refills: 5 | Status: SHIPPED | OUTPATIENT
Start: 2019-12-15 | End: 2019-12-20 | Stop reason: SDUPTHER

## 2019-12-20 DIAGNOSIS — N32.81 OAB (OVERACTIVE BLADDER): ICD-10-CM

## 2019-12-20 RX ORDER — OXYBUTYNIN CHLORIDE 5 MG/1
5 TABLET, EXTENDED RELEASE ORAL DAILY
Qty: 30 TABLET | Refills: 5 | Status: SHIPPED | OUTPATIENT
Start: 2019-12-20 | End: 2020-07-16 | Stop reason: SDUPTHER

## 2019-12-20 RX ORDER — OXYBUTYNIN CHLORIDE 15 MG/1
15 TABLET, EXTENDED RELEASE ORAL DAILY
Qty: 30 TABLET | Refills: 5 | Status: SHIPPED | OUTPATIENT
Start: 2019-12-20 | End: 2020-07-16 | Stop reason: SDUPTHER

## 2019-12-20 NOTE — TELEPHONE ENCOUNTER
Dr Allred patient.  RX name and strength: Oxybutinin 15 mg & Oxybutinin 5 mg  Last office visit: 10/9/19  Is this a 30-day or 90-day RX:  Both 30 day  [facility] [place]  Comments: states medication needed today -- please route to Dr in clinic today

## 2019-12-20 NOTE — TELEPHONE ENCOUNTER
----- Message from Ephraim Cowart sent at 2019  8:33 AM CST -----  Contact: Patient  Jessi Linton  MRN: [id number]  : 1965  PCP: David Allred  Home Phone      [phone]  Work Phone      Not on file.  Mobile          [phone]      MESSAGE:   Pt requesting refill or new Rx.   Is this a refill or new RX:  Refills  RX name and strength: Oxybutinin 15 mg & Oxybutinin 5 mg  Last office visit: 10/9/19  Is this a 30-day or 90-day RX:  Both 30 day  [facility] [place]  Comments: states medication needed today -- please route to Dr in clinic today     Phone:  [phone]    PCP: Brigida

## 2019-12-30 DIAGNOSIS — G43.009 MIGRAINE WITHOUT AURA AND WITHOUT STATUS MIGRAINOSUS, NOT INTRACTABLE: ICD-10-CM

## 2019-12-30 DIAGNOSIS — R60.9 EDEMA, UNSPECIFIED TYPE: ICD-10-CM

## 2019-12-30 RX ORDER — FUROSEMIDE 20 MG/1
TABLET ORAL
Qty: 60 TABLET | Refills: 0 | Status: SHIPPED | OUTPATIENT
Start: 2019-12-30 | End: 2020-02-18

## 2019-12-30 RX ORDER — MONTELUKAST SODIUM 10 MG/1
TABLET ORAL
Qty: 30 TABLET | Refills: 5 | Status: SHIPPED | OUTPATIENT
Start: 2019-12-30 | End: 2020-06-23

## 2019-12-30 RX ORDER — PROPRANOLOL HYDROCHLORIDE 20 MG/1
TABLET ORAL
Qty: 60 TABLET | Refills: 5 | Status: SHIPPED | OUTPATIENT
Start: 2019-12-30 | End: 2020-06-15

## 2020-01-09 ENCOUNTER — OFFICE VISIT (OUTPATIENT)
Dept: FAMILY MEDICINE | Facility: CLINIC | Age: 55
End: 2020-01-09
Payer: MEDICAID

## 2020-01-09 VITALS
OXYGEN SATURATION: 97 % | HEIGHT: 55 IN | RESPIRATION RATE: 16 BRPM | SYSTOLIC BLOOD PRESSURE: 130 MMHG | HEART RATE: 84 BPM | DIASTOLIC BLOOD PRESSURE: 78 MMHG | BODY MASS INDEX: 51.74 KG/M2 | WEIGHT: 223.56 LBS

## 2020-01-09 DIAGNOSIS — R60.9 DEPENDENT EDEMA: ICD-10-CM

## 2020-01-09 DIAGNOSIS — G47.33 OSA (OBSTRUCTIVE SLEEP APNEA): ICD-10-CM

## 2020-01-09 DIAGNOSIS — E66.01 CLASS 3 SEVERE OBESITY DUE TO EXCESS CALORIES WITHOUT SERIOUS COMORBIDITY WITH BODY MASS INDEX (BMI) OF 40.0 TO 44.9 IN ADULT: Primary | ICD-10-CM

## 2020-01-09 PROCEDURE — 99213 OFFICE O/P EST LOW 20 MIN: CPT | Mod: PBBFAC | Performed by: FAMILY MEDICINE

## 2020-01-09 PROCEDURE — 99213 OFFICE O/P EST LOW 20 MIN: CPT | Mod: S$PBB,,, | Performed by: FAMILY MEDICINE

## 2020-01-09 PROCEDURE — 99999 PR PBB SHADOW E&M-EST. PATIENT-LVL III: ICD-10-PCS | Mod: PBBFAC,,, | Performed by: FAMILY MEDICINE

## 2020-01-09 PROCEDURE — 99999 PR PBB SHADOW E&M-EST. PATIENT-LVL III: CPT | Mod: PBBFAC,,, | Performed by: FAMILY MEDICINE

## 2020-01-09 PROCEDURE — 99213 PR OFFICE/OUTPT VISIT, EST, LEVL III, 20-29 MIN: ICD-10-PCS | Mod: S$PBB,,, | Performed by: FAMILY MEDICINE

## 2020-01-09 NOTE — PROGRESS NOTES
Subjective:       Patient ID: Jessi Linton is a 54 y.o. female.    Chief Complaint: Follow-up ( 3month )    Pt is a 54 y.o. female who presents for evaluation and management of   Encounter Diagnoses   Name Primary?    Class 3 severe obesity due to excess calories without serious comorbidity with body mass index (BMI) of 40.0 to 44.9 in adult Yes    Dependent edema    .She hasnt gained weight but she hasn't lost any either   Not on nutrasystem anymore   Going to gym sporadically  Eating fast food too frequently     Doing well on current meds. Denies any side effects. Prevention is up to date.  Review of Systems   Respiratory: Positive for apnea and cough.         Compliant with cpap    Cardiovascular: Positive for leg swelling.       Objective:      Physical Exam   Constitutional: She is oriented to person, place, and time. She appears well-developed and well-nourished.   Truncal obesity      HENT:   Head: Normocephalic and atraumatic.   Right Ear: External ear normal.   Left Ear: External ear normal.   Nose: Nose normal.   Mouth/Throat: Oropharynx is clear and moist.   Eyes: Pupils are equal, round, and reactive to light. EOM are normal.   Neck: Normal range of motion. Neck supple. No JVD present. No tracheal deviation present. No thyromegaly present.   Cardiovascular: Normal rate, normal heart sounds and intact distal pulses.   No murmur heard.  Pulmonary/Chest: Effort normal and breath sounds normal. No respiratory distress. She has no wheezes. She has no rales. She exhibits no tenderness.   Abdominal: Soft. Bowel sounds are normal. She exhibits no distension and no mass. There is no tenderness. There is no rebound and no guarding.   Musculoskeletal: Normal range of motion. She exhibits edema. She exhibits no tenderness.   2 PLUS EDEMA BLE  NO CALF TTP. NO PALP CORD    Lymphadenopathy:     She has no cervical adenopathy.   Neurological: She is alert and oriented to person, place, and time. She has normal  reflexes. She displays normal reflexes. No cranial nerve deficit. She exhibits normal muscle tone. Coordination normal.   Skin: Skin is warm and dry. No rash noted. No erythema. No pallor.   Psychiatric: She has a normal mood and affect. Her behavior is normal. Judgment and thought content normal.       Assessment:       1. Class 3 severe obesity due to excess calories without serious comorbidity with body mass index (BMI) of 40.0 to 44.9 in adult    2. Dependent edema        Plan:   Jessi Conway was seen today for follow-up.    Diagnoses and all orders for this visit:    Class 3 severe obesity due to excess calories without serious comorbidity with body mass index (BMI) of 40.0 to 44.9 in adult    Dependent edema    MAME (obstructive sleep apnea)      Problem List Items Addressed This Visit     Dependent edema    Obesity - Primary      She has to lose weight for her edema to improve. I will not prescribe more diuretics     The patient is asked to make an attempt to improve diet and exercise patterns to aid in medical management of this problem.  5 small meals a day. Cut out white carbs: bread, rice, pasta, potatoes. Exercise/walk 5x/week for at least 30 minutes  .    Low salt diet     No follow-ups on file.

## 2020-02-10 DIAGNOSIS — J45.41 MODERATE PERSISTENT ASTHMA WITH ACUTE EXACERBATION: ICD-10-CM

## 2020-02-10 RX ORDER — ALBUTEROL SULFATE 90 UG/1
2 AEROSOL, METERED RESPIRATORY (INHALATION) EVERY 6 HOURS PRN
Qty: 18 G | Refills: 5 | Status: SHIPPED | OUTPATIENT
Start: 2020-02-10 | End: 2021-02-09

## 2020-02-18 DIAGNOSIS — R60.9 EDEMA, UNSPECIFIED TYPE: ICD-10-CM

## 2020-02-18 RX ORDER — FUROSEMIDE 20 MG/1
TABLET ORAL
Qty: 60 TABLET | Refills: 0 | Status: SHIPPED | OUTPATIENT
Start: 2020-02-18 | End: 2020-03-15

## 2020-03-15 DIAGNOSIS — R60.9 EDEMA, UNSPECIFIED TYPE: ICD-10-CM

## 2020-03-15 RX ORDER — FUROSEMIDE 20 MG/1
TABLET ORAL
Qty: 60 TABLET | Refills: 0 | Status: SHIPPED | OUTPATIENT
Start: 2020-03-15 | End: 2020-04-09

## 2020-04-09 DIAGNOSIS — R60.9 EDEMA, UNSPECIFIED TYPE: ICD-10-CM

## 2020-04-09 RX ORDER — FUROSEMIDE 20 MG/1
TABLET ORAL
Qty: 60 TABLET | Refills: 0 | Status: SHIPPED | OUTPATIENT
Start: 2020-04-09 | End: 2020-05-08

## 2020-05-08 DIAGNOSIS — R60.9 EDEMA, UNSPECIFIED TYPE: ICD-10-CM

## 2020-05-08 RX ORDER — FUROSEMIDE 20 MG/1
TABLET ORAL
Qty: 60 TABLET | Refills: 0 | Status: SHIPPED | OUTPATIENT
Start: 2020-05-08 | End: 2020-06-03

## 2020-05-10 DIAGNOSIS — E89.40 SURGICAL MENOPAUSE: ICD-10-CM

## 2020-05-10 RX ORDER — ESTROGENS, CONJUGATED 0.3 MG/1
TABLET, FILM COATED ORAL
Qty: 30 TABLET | Refills: 5 | Status: SHIPPED | OUTPATIENT
Start: 2020-05-10 | End: 2020-07-16 | Stop reason: SDUPTHER

## 2020-07-08 ENCOUNTER — OFFICE VISIT (OUTPATIENT)
Dept: FAMILY MEDICINE | Facility: CLINIC | Age: 55
End: 2020-07-08
Payer: MEDICAID

## 2020-07-08 VITALS
WEIGHT: 227.5 LBS | RESPIRATION RATE: 16 BRPM | DIASTOLIC BLOOD PRESSURE: 80 MMHG | TEMPERATURE: 98 F | OXYGEN SATURATION: 98 % | HEIGHT: 55 IN | BODY MASS INDEX: 52.65 KG/M2 | SYSTOLIC BLOOD PRESSURE: 116 MMHG | HEART RATE: 68 BPM

## 2020-07-08 DIAGNOSIS — G89.29 CHRONIC NONINTRACTABLE HEADACHE, UNSPECIFIED HEADACHE TYPE: ICD-10-CM

## 2020-07-08 DIAGNOSIS — R73.02 GLUCOSE INTOLERANCE (IMPAIRED GLUCOSE TOLERANCE): ICD-10-CM

## 2020-07-08 DIAGNOSIS — E66.01 CLASS 3 SEVERE OBESITY DUE TO EXCESS CALORIES WITHOUT SERIOUS COMORBIDITY WITH BODY MASS INDEX (BMI) OF 40.0 TO 44.9 IN ADULT: Primary | ICD-10-CM

## 2020-07-08 DIAGNOSIS — R51.9 CHRONIC NONINTRACTABLE HEADACHE, UNSPECIFIED HEADACHE TYPE: ICD-10-CM

## 2020-07-08 DIAGNOSIS — R60.9 DEPENDENT EDEMA: ICD-10-CM

## 2020-07-08 DIAGNOSIS — G47.33 OSA (OBSTRUCTIVE SLEEP APNEA): ICD-10-CM

## 2020-07-08 PROBLEM — M25.512 CHRONIC PAIN IN LEFT SHOULDER: Status: RESOLVED | Noted: 2019-06-28 | Resolved: 2020-07-08

## 2020-07-08 PROBLEM — J20.9 COPD WITH ACUTE BRONCHITIS: Status: RESOLVED | Noted: 2017-12-08 | Resolved: 2020-07-08

## 2020-07-08 PROBLEM — J44.0 COPD WITH ACUTE BRONCHITIS: Status: RESOLVED | Noted: 2017-12-08 | Resolved: 2020-07-08

## 2020-07-08 PROCEDURE — 99214 PR OFFICE/OUTPT VISIT, EST, LEVL IV, 30-39 MIN: ICD-10-PCS | Mod: S$PBB,,, | Performed by: FAMILY MEDICINE

## 2020-07-08 PROCEDURE — 99213 OFFICE O/P EST LOW 20 MIN: CPT | Mod: PBBFAC | Performed by: FAMILY MEDICINE

## 2020-07-08 PROCEDURE — 99999 PR PBB SHADOW E&M-EST. PATIENT-LVL III: CPT | Mod: PBBFAC,,, | Performed by: FAMILY MEDICINE

## 2020-07-08 PROCEDURE — 99999 PR PBB SHADOW E&M-EST. PATIENT-LVL III: ICD-10-PCS | Mod: PBBFAC,,, | Performed by: FAMILY MEDICINE

## 2020-07-08 PROCEDURE — 99214 OFFICE O/P EST MOD 30 MIN: CPT | Mod: S$PBB,,, | Performed by: FAMILY MEDICINE

## 2020-07-08 NOTE — PROGRESS NOTES
Subjective:       Patient ID: Jessi Linton is a 54 y.o. female.    Chief Complaint: Follow-up (6 month)    Pt is a 54 y.o. female who presents for evaluation and management of   Encounter Diagnoses   Name Primary?    Class 3 severe obesity due to excess calories without serious comorbidity with body mass index (BMI) of 40.0 to 44.9 in adult Yes    MAME (obstructive sleep apnea)     Glucose intolerance (impaired glucose tolerance)     Dependent edema     Chronic nonintractable headache, unspecified headache type    .  Doing well on current meds. Denies any side effects. Prevention is up to date.    Review of Systems   Constitutional: Negative for chills and fever.   Respiratory: Positive for apnea. Negative for cough and shortness of breath.         Compliant with cpap    Cardiovascular: Positive for leg swelling. Negative for chest pain and palpitations.   Gastrointestinal: Negative for abdominal pain, blood in stool, constipation and nausea.   Genitourinary: Negative for difficulty urinating.   Psychiatric/Behavioral: Negative for dysphoric mood, sleep disturbance and suicidal ideas. The patient is not nervous/anxious.        Objective:      Physical Exam  Constitutional:       Appearance: She is well-developed.      Comments: Truncal obesity      HENT:      Head: Normocephalic and atraumatic.      Right Ear: External ear normal.      Left Ear: External ear normal.      Nose: Nose normal.   Eyes:      Pupils: Pupils are equal, round, and reactive to light.   Neck:      Musculoskeletal: Normal range of motion and neck supple.      Thyroid: No thyromegaly.      Vascular: No JVD.      Trachea: No tracheal deviation.   Cardiovascular:      Rate and Rhythm: Normal rate.      Heart sounds: Normal heart sounds. No murmur.   Pulmonary:      Effort: Pulmonary effort is normal. No respiratory distress.      Breath sounds: Normal breath sounds. No wheezing or rales.   Chest:      Chest wall: No tenderness.    Abdominal:      General: Bowel sounds are normal. There is no distension.      Palpations: Abdomen is soft. There is no mass.      Tenderness: There is no abdominal tenderness. There is no guarding or rebound.   Musculoskeletal: Normal range of motion.         General: No tenderness.      Right lower leg: Edema present.      Left lower leg: Edema present.      Comments: 2 PLUS EDEMA BLE  NO CALF TTP. NO PALP CORD    Lymphadenopathy:      Cervical: No cervical adenopathy.   Skin:     General: Skin is warm and dry.      Coloration: Skin is not pale.      Findings: No erythema or rash.   Neurological:      Mental Status: She is alert and oriented to person, place, and time.      Cranial Nerves: No cranial nerve deficit.      Motor: No abnormal muscle tone.      Coordination: Coordination normal.      Deep Tendon Reflexes: Reflexes are normal and symmetric. Reflexes normal.   Psychiatric:         Behavior: Behavior normal.         Thought Content: Thought content normal.         Judgment: Judgment normal.         Assessment:       1. Class 3 severe obesity due to excess calories without serious comorbidity with body mass index (BMI) of 40.0 to 44.9 in adult    2. MAME (obstructive sleep apnea)    3. Glucose intolerance (impaired glucose tolerance)    4. Dependent edema    5. Chronic nonintractable headache, unspecified headache type        Plan:   Jessi Conway was seen today for follow-up.    Diagnoses and all orders for this visit:    Class 3 severe obesity due to excess calories without serious comorbidity with body mass index (BMI) of 40.0 to 44.9 in adult  -     Comprehensive metabolic panel; Future  -     Lipid Panel; Future  -     TSH; Future    MAME (obstructive sleep apnea)    Glucose intolerance (impaired glucose tolerance)  -     Comprehensive metabolic panel; Future  -     Lipid Panel; Future  -     Hemoglobin A1C; Future  -     TSH; Future    Dependent edema  -     Comprehensive metabolic panel; Future  -      TSH; Future    Chronic nonintractable headache, unspecified headache type      Problem List Items Addressed This Visit     Dependent edema    Relevant Orders    Comprehensive metabolic panel    TSH    Glucose intolerance (impaired glucose tolerance)    Relevant Orders    Comprehensive metabolic panel    Lipid Panel    Hemoglobin A1C    TSH    Obesity - Primary    Relevant Orders    Comprehensive metabolic panel    Lipid Panel    TSH    MAME (obstructive sleep apnea)    Overview     Compliant with cpap   benefitting from cpap            Other Visit Diagnoses     Chronic nonintractable headache, unspecified headache type            No follow-ups on file.

## 2020-07-16 DIAGNOSIS — N32.81 OAB (OVERACTIVE BLADDER): ICD-10-CM

## 2020-07-16 DIAGNOSIS — E89.40 SURGICAL MENOPAUSE: ICD-10-CM

## 2020-07-16 RX ORDER — OXYBUTYNIN CHLORIDE 5 MG/1
5 TABLET, EXTENDED RELEASE ORAL DAILY
Qty: 30 TABLET | Refills: 5 | Status: SHIPPED | OUTPATIENT
Start: 2020-07-16 | End: 2021-01-14 | Stop reason: SDUPTHER

## 2020-07-16 RX ORDER — OXYBUTYNIN CHLORIDE 15 MG/1
15 TABLET, EXTENDED RELEASE ORAL DAILY
Qty: 30 TABLET | Refills: 5 | Status: SHIPPED | OUTPATIENT
Start: 2020-07-16 | End: 2021-01-14 | Stop reason: SDUPTHER

## 2020-07-16 NOTE — TELEPHONE ENCOUNTER
----- Message from Aliya Gomez sent at 2020  8:57 AM CDT -----  Jessi Linton  MRN: [id number]  : 1965  PCP: David Allred  Home Phone      [phone]  Work Phone      Not on file.  Mobile          [phone]      MESSAGE:   Pt requesting refill or new Rx.   Is this a refill or new RX:  refill  RX name and strength: oxybutynin (DITROPAN XL) 15 MG Tr24    oxybutynin (DITROPAN-XL) 5 MG Tr24    PREMARIN 0.3 mg tablet    Last office visit: 20  Is this a 30-day or 90-day RX:  30  [facility] [place]  Comments:      Phone:  [phone]

## 2020-07-28 ENCOUNTER — TELEPHONE (OUTPATIENT)
Dept: FAMILY MEDICINE | Facility: CLINIC | Age: 55
End: 2020-07-28

## 2020-07-28 ENCOUNTER — CLINICAL SUPPORT (OUTPATIENT)
Dept: FAMILY MEDICINE | Facility: CLINIC | Age: 55
End: 2020-07-28
Payer: MEDICAID

## 2020-07-28 DIAGNOSIS — E66.01 CLASS 3 SEVERE OBESITY DUE TO EXCESS CALORIES WITHOUT SERIOUS COMORBIDITY WITH BODY MASS INDEX (BMI) OF 40.0 TO 44.9 IN ADULT: ICD-10-CM

## 2020-07-28 DIAGNOSIS — R73.02 GLUCOSE INTOLERANCE (IMPAIRED GLUCOSE TOLERANCE): ICD-10-CM

## 2020-07-28 DIAGNOSIS — R60.9 DEPENDENT EDEMA: ICD-10-CM

## 2020-07-28 LAB
ALBUMIN SERPL BCP-MCNC: 3.5 G/DL (ref 3.5–5.2)
ALP SERPL-CCNC: 84 U/L (ref 55–135)
ALT SERPL W/O P-5'-P-CCNC: 42 U/L (ref 10–44)
ANION GAP SERPL CALC-SCNC: 9 MMOL/L (ref 8–16)
AST SERPL-CCNC: 25 U/L (ref 10–40)
BILIRUB SERPL-MCNC: 0.6 MG/DL (ref 0.1–1)
BUN SERPL-MCNC: 19 MG/DL (ref 6–20)
CALCIUM SERPL-MCNC: 9.7 MG/DL (ref 8.7–10.5)
CHLORIDE SERPL-SCNC: 102 MMOL/L (ref 95–110)
CHOLEST SERPL-MCNC: 192 MG/DL (ref 120–199)
CHOLEST/HDLC SERPL: 3.8 {RATIO} (ref 2–5)
CO2 SERPL-SCNC: 30 MMOL/L (ref 23–29)
CREAT SERPL-MCNC: 0.8 MG/DL (ref 0.5–1.4)
EST. GFR  (AFRICAN AMERICAN): >60 ML/MIN/1.73 M^2
EST. GFR  (NON AFRICAN AMERICAN): >60 ML/MIN/1.73 M^2
ESTIMATED AVG GLUCOSE: 117 MG/DL (ref 68–131)
GLUCOSE SERPL-MCNC: 99 MG/DL (ref 70–110)
HBA1C MFR BLD HPLC: 5.7 % (ref 4–5.6)
HDLC SERPL-MCNC: 50 MG/DL (ref 40–75)
HDLC SERPL: 26 % (ref 20–50)
LDLC SERPL CALC-MCNC: 117 MG/DL (ref 63–159)
NONHDLC SERPL-MCNC: 142 MG/DL
POTASSIUM SERPL-SCNC: 4.6 MMOL/L (ref 3.5–5.1)
PROT SERPL-MCNC: 7.3 G/DL (ref 6–8.4)
SODIUM SERPL-SCNC: 141 MMOL/L (ref 136–145)
TRIGL SERPL-MCNC: 125 MG/DL (ref 30–150)
TSH SERPL DL<=0.005 MIU/L-ACNC: 2.39 UIU/ML (ref 0.4–4)

## 2020-07-28 PROCEDURE — 80053 COMPREHEN METABOLIC PANEL: CPT

## 2020-07-28 PROCEDURE — 83036 HEMOGLOBIN GLYCOSYLATED A1C: CPT

## 2020-07-28 PROCEDURE — 84443 ASSAY THYROID STIM HORMONE: CPT

## 2020-07-28 PROCEDURE — 36415 COLL VENOUS BLD VENIPUNCTURE: CPT | Mod: PBBFAC

## 2020-07-28 PROCEDURE — 80061 LIPID PANEL: CPT

## 2020-07-28 NOTE — TELEPHONE ENCOUNTER
----- Message from Aliya Gomez sent at 2020  8:51 AM CDT -----  Contact: self  Jessi Linton  MRN: [id number]  : 1965  PCP: David Allred  Home Phone      [phone]  Work Phone      Not on file.  Mobile          [phone]      MESSAGE:   Patient talked to Ning this morning and asked to get a 2 year handicap paper. She would like to see if she can get a permanent one.     [phone]

## 2020-10-13 DIAGNOSIS — R60.9 EDEMA, UNSPECIFIED TYPE: ICD-10-CM

## 2020-10-13 RX ORDER — FUROSEMIDE 20 MG/1
20 TABLET ORAL DAILY
Qty: 30 TABLET | Refills: 2 | Status: SHIPPED | OUTPATIENT
Start: 2020-10-13 | End: 2021-01-12

## 2020-10-13 NOTE — TELEPHONE ENCOUNTER
----- Message from Brandee Avilez sent at 10/13/2020 11:29 AM CDT -----  Regarding: med refill  Contact: Self  Jessi Linton  MRN: [id number]  : 1965  PCP: David Allred  Home Phone      [phone]  Work Phone      Not on file.  Mobile          [phone]      MESSAGE:   Pt requesting refill or new Rx.   Is this a refill or new RX:  refill  RX name and strength: furosemide (LASIX) 20 MG tablet  Last office visit:   Is this a 30-day or 90-day RX:  30-day  [facility] [place]  Comments:      Phone:  [phone]

## 2021-01-08 ENCOUNTER — TELEPHONE (OUTPATIENT)
Dept: FAMILY MEDICINE | Facility: CLINIC | Age: 56
End: 2021-01-08

## 2021-01-08 DIAGNOSIS — R73.09 IMPAIRED GLUCOSE METABOLISM: Primary | ICD-10-CM

## 2021-01-11 ENCOUNTER — CLINICAL SUPPORT (OUTPATIENT)
Dept: FAMILY MEDICINE | Facility: CLINIC | Age: 56
End: 2021-01-11
Payer: MEDICAID

## 2021-01-11 DIAGNOSIS — R73.09 IMPAIRED GLUCOSE METABOLISM: ICD-10-CM

## 2021-01-11 LAB
ALBUMIN SERPL BCP-MCNC: 3.7 G/DL (ref 3.5–5.2)
ALP SERPL-CCNC: 80 U/L (ref 55–135)
ALT SERPL W/O P-5'-P-CCNC: 34 U/L (ref 10–44)
ANION GAP SERPL CALC-SCNC: 12 MMOL/L (ref 8–16)
AST SERPL-CCNC: 22 U/L (ref 10–40)
BILIRUB SERPL-MCNC: 0.6 MG/DL (ref 0.1–1)
BUN SERPL-MCNC: 21 MG/DL (ref 6–20)
CALCIUM SERPL-MCNC: 9.3 MG/DL (ref 8.7–10.5)
CHLORIDE SERPL-SCNC: 100 MMOL/L (ref 95–110)
CO2 SERPL-SCNC: 28 MMOL/L (ref 23–29)
CREAT SERPL-MCNC: 0.8 MG/DL (ref 0.5–1.4)
EST. GFR  (AFRICAN AMERICAN): >60 ML/MIN/1.73 M^2
EST. GFR  (NON AFRICAN AMERICAN): >60 ML/MIN/1.73 M^2
GLUCOSE SERPL-MCNC: 88 MG/DL (ref 70–110)
POTASSIUM SERPL-SCNC: 4.2 MMOL/L (ref 3.5–5.1)
PROT SERPL-MCNC: 7.6 G/DL (ref 6–8.4)
SODIUM SERPL-SCNC: 140 MMOL/L (ref 136–145)
TSH SERPL DL<=0.005 MIU/L-ACNC: 2.95 UIU/ML (ref 0.4–4)

## 2021-01-11 PROCEDURE — 83036 HEMOGLOBIN GLYCOSYLATED A1C: CPT

## 2021-01-11 PROCEDURE — 84443 ASSAY THYROID STIM HORMONE: CPT

## 2021-01-11 PROCEDURE — 36415 COLL VENOUS BLD VENIPUNCTURE: CPT | Mod: PBBFAC

## 2021-01-11 PROCEDURE — 80053 COMPREHEN METABOLIC PANEL: CPT

## 2021-01-12 LAB
ESTIMATED AVG GLUCOSE: 120 MG/DL (ref 68–131)
HBA1C MFR BLD HPLC: 5.8 % (ref 4–5.6)

## 2021-01-14 ENCOUNTER — OFFICE VISIT (OUTPATIENT)
Dept: FAMILY MEDICINE | Facility: CLINIC | Age: 56
End: 2021-01-14
Payer: MEDICAID

## 2021-01-14 VITALS
HEART RATE: 70 BPM | DIASTOLIC BLOOD PRESSURE: 82 MMHG | BODY MASS INDEX: 53.75 KG/M2 | HEIGHT: 55 IN | OXYGEN SATURATION: 98 % | TEMPERATURE: 97 F | WEIGHT: 232.25 LBS | SYSTOLIC BLOOD PRESSURE: 130 MMHG | RESPIRATION RATE: 16 BRPM

## 2021-01-14 DIAGNOSIS — G47.33 OSA (OBSTRUCTIVE SLEEP APNEA): Primary | ICD-10-CM

## 2021-01-14 DIAGNOSIS — E66.01 CLASS 3 SEVERE OBESITY DUE TO EXCESS CALORIES WITHOUT SERIOUS COMORBIDITY WITH BODY MASS INDEX (BMI) OF 40.0 TO 44.9 IN ADULT: ICD-10-CM

## 2021-01-14 DIAGNOSIS — G43.009 MIGRAINE WITHOUT AURA AND WITHOUT STATUS MIGRAINOSUS, NOT INTRACTABLE: ICD-10-CM

## 2021-01-14 DIAGNOSIS — N32.81 OAB (OVERACTIVE BLADDER): ICD-10-CM

## 2021-01-14 DIAGNOSIS — R60.9 EDEMA, UNSPECIFIED TYPE: ICD-10-CM

## 2021-01-14 DIAGNOSIS — R73.02 GLUCOSE INTOLERANCE (IMPAIRED GLUCOSE TOLERANCE): ICD-10-CM

## 2021-01-14 DIAGNOSIS — Z12.39 ENCOUNTER FOR SCREENING FOR MALIGNANT NEOPLASM OF BREAST, UNSPECIFIED SCREENING MODALITY: ICD-10-CM

## 2021-01-14 DIAGNOSIS — E89.40 SURGICAL MENOPAUSE: ICD-10-CM

## 2021-01-14 PROCEDURE — 99214 PR OFFICE/OUTPT VISIT, EST, LEVL IV, 30-39 MIN: ICD-10-PCS | Mod: S$PBB,,, | Performed by: FAMILY MEDICINE

## 2021-01-14 PROCEDURE — 99213 OFFICE O/P EST LOW 20 MIN: CPT | Mod: PBBFAC | Performed by: FAMILY MEDICINE

## 2021-01-14 PROCEDURE — 99999 PR PBB SHADOW E&M-EST. PATIENT-LVL III: CPT | Mod: PBBFAC,,, | Performed by: FAMILY MEDICINE

## 2021-01-14 PROCEDURE — 90686 IIV4 VACC NO PRSV 0.5 ML IM: CPT | Mod: PBBFAC

## 2021-01-14 PROCEDURE — 99214 OFFICE O/P EST MOD 30 MIN: CPT | Mod: S$PBB,,, | Performed by: FAMILY MEDICINE

## 2021-01-14 PROCEDURE — 99999 PR PBB SHADOW E&M-EST. PATIENT-LVL III: ICD-10-PCS | Mod: PBBFAC,,, | Performed by: FAMILY MEDICINE

## 2021-01-14 RX ORDER — OXYBUTYNIN CHLORIDE 15 MG/1
15 TABLET, EXTENDED RELEASE ORAL DAILY
Qty: 30 TABLET | Refills: 5 | Status: SHIPPED | OUTPATIENT
Start: 2021-01-14 | End: 2021-12-20

## 2021-01-14 RX ORDER — FUROSEMIDE 20 MG/1
20 TABLET ORAL DAILY
Qty: 30 TABLET | Refills: 2 | Status: SHIPPED | OUTPATIENT
Start: 2021-01-14 | End: 2021-07-08

## 2021-01-14 RX ORDER — FLUTICASONE PROPIONATE 50 MCG
2 SPRAY, SUSPENSION (ML) NASAL DAILY
Qty: 16 G | Refills: 5 | Status: SHIPPED | OUTPATIENT
Start: 2021-01-14 | End: 2021-02-13

## 2021-01-14 RX ORDER — OXYBUTYNIN CHLORIDE 5 MG/1
5 TABLET, EXTENDED RELEASE ORAL DAILY
Qty: 30 TABLET | Refills: 5 | Status: SHIPPED | OUTPATIENT
Start: 2021-01-14 | End: 2021-06-15 | Stop reason: SDUPTHER

## 2021-01-14 RX ORDER — AZELASTINE 1 MG/ML
1 SPRAY, METERED NASAL 2 TIMES DAILY
Qty: 30 ML | Refills: 5 | Status: SHIPPED | OUTPATIENT
Start: 2021-01-14 | End: 2022-07-25

## 2021-01-15 ENCOUNTER — TELEPHONE (OUTPATIENT)
Dept: FAMILY MEDICINE | Facility: CLINIC | Age: 56
End: 2021-01-15

## 2021-01-21 RX ORDER — AZITHROMYCIN 500 MG/1
500 TABLET, FILM COATED ORAL DAILY
Qty: 3 TABLET | Refills: 0 | Status: SHIPPED | OUTPATIENT
Start: 2021-01-21 | End: 2021-01-24

## 2021-02-26 ENCOUNTER — HOSPITAL ENCOUNTER (OUTPATIENT)
Dept: RADIOLOGY | Facility: HOSPITAL | Age: 56
Discharge: HOME | End: 2021-02-26
Attending: FAMILY MEDICINE
Payer: MEDICAID

## 2021-02-26 VITALS — HEIGHT: 55 IN | WEIGHT: 232 LBS | BODY MASS INDEX: 53.69 KG/M2

## 2021-02-26 DIAGNOSIS — Z12.39 ENCOUNTER FOR SCREENING FOR MALIGNANT NEOPLASM OF BREAST, UNSPECIFIED SCREENING MODALITY: ICD-10-CM

## 2021-02-26 PROCEDURE — 77063 MAMMO DIGITAL SCREENING BILAT WITH TOMO: ICD-10-PCS | Mod: 26,,, | Performed by: RADIOLOGY

## 2021-02-26 PROCEDURE — 77067 SCR MAMMO BI INCL CAD: CPT | Mod: 26,,, | Performed by: RADIOLOGY

## 2021-02-26 PROCEDURE — 77063 BREAST TOMOSYNTHESIS BI: CPT | Mod: 26,,, | Performed by: RADIOLOGY

## 2021-02-26 PROCEDURE — 77067 MAMMO DIGITAL SCREENING BILAT WITH TOMO: ICD-10-PCS | Mod: 26,,, | Performed by: RADIOLOGY

## 2021-02-26 PROCEDURE — 77067 SCR MAMMO BI INCL CAD: CPT | Mod: TC

## 2021-06-07 ENCOUNTER — TELEPHONE (OUTPATIENT)
Dept: FAMILY MEDICINE | Facility: CLINIC | Age: 56
End: 2021-06-07

## 2021-06-28 ENCOUNTER — TELEPHONE (OUTPATIENT)
Dept: FAMILY MEDICINE | Facility: CLINIC | Age: 56
End: 2021-06-28

## 2021-07-09 ENCOUNTER — TELEPHONE (OUTPATIENT)
Dept: FAMILY MEDICINE | Facility: CLINIC | Age: 56
End: 2021-07-09

## 2021-07-09 DIAGNOSIS — R73.02 IMPAIRED GLUCOSE TOLERANCE: Primary | ICD-10-CM

## 2021-07-15 ENCOUNTER — OFFICE VISIT (OUTPATIENT)
Dept: FAMILY MEDICINE | Facility: CLINIC | Age: 56
End: 2021-07-15
Payer: MEDICAID

## 2021-07-15 ENCOUNTER — CLINICAL SUPPORT (OUTPATIENT)
Dept: FAMILY MEDICINE | Facility: CLINIC | Age: 56
End: 2021-07-15
Payer: MEDICAID

## 2021-07-15 ENCOUNTER — TELEPHONE (OUTPATIENT)
Dept: FAMILY MEDICINE | Facility: CLINIC | Age: 56
End: 2021-07-15

## 2021-07-15 VITALS
BODY MASS INDEX: 54.92 KG/M2 | DIASTOLIC BLOOD PRESSURE: 86 MMHG | RESPIRATION RATE: 16 BRPM | HEIGHT: 55 IN | HEART RATE: 78 BPM | OXYGEN SATURATION: 98 % | WEIGHT: 237.31 LBS | TEMPERATURE: 98 F | SYSTOLIC BLOOD PRESSURE: 132 MMHG

## 2021-07-15 DIAGNOSIS — G43.009 MIGRAINE WITHOUT AURA AND WITHOUT STATUS MIGRAINOSUS, NOT INTRACTABLE: ICD-10-CM

## 2021-07-15 DIAGNOSIS — G47.33 OSA (OBSTRUCTIVE SLEEP APNEA): ICD-10-CM

## 2021-07-15 DIAGNOSIS — R73.02 IMPAIRED GLUCOSE TOLERANCE: ICD-10-CM

## 2021-07-15 DIAGNOSIS — E66.01 CLASS 3 SEVERE OBESITY DUE TO EXCESS CALORIES WITHOUT SERIOUS COMORBIDITY WITH BODY MASS INDEX (BMI) OF 40.0 TO 44.9 IN ADULT: ICD-10-CM

## 2021-07-15 DIAGNOSIS — K21.9 GASTROESOPHAGEAL REFLUX DISEASE WITHOUT ESOPHAGITIS: Primary | ICD-10-CM

## 2021-07-15 DIAGNOSIS — R73.02 GLUCOSE INTOLERANCE (IMPAIRED GLUCOSE TOLERANCE): ICD-10-CM

## 2021-07-15 LAB
ALBUMIN SERPL BCP-MCNC: 3.5 G/DL (ref 3.5–5.2)
ALP SERPL-CCNC: 75 U/L (ref 55–135)
ALT SERPL W/O P-5'-P-CCNC: 47 U/L (ref 10–44)
ANION GAP SERPL CALC-SCNC: 12 MMOL/L (ref 8–16)
AST SERPL-CCNC: 29 U/L (ref 10–40)
BILIRUB SERPL-MCNC: 0.6 MG/DL (ref 0.1–1)
BUN SERPL-MCNC: 20 MG/DL (ref 6–20)
CALCIUM SERPL-MCNC: 10.1 MG/DL (ref 8.7–10.5)
CHLORIDE SERPL-SCNC: 101 MMOL/L (ref 95–110)
CHOLEST SERPL-MCNC: 186 MG/DL (ref 120–199)
CHOLEST/HDLC SERPL: 4 {RATIO} (ref 2–5)
CO2 SERPL-SCNC: 28 MMOL/L (ref 23–29)
CREAT SERPL-MCNC: 0.8 MG/DL (ref 0.5–1.4)
EST. GFR  (AFRICAN AMERICAN): >60 ML/MIN/1.73 M^2
EST. GFR  (NON AFRICAN AMERICAN): >60 ML/MIN/1.73 M^2
GLUCOSE SERPL-MCNC: 102 MG/DL (ref 70–110)
HDLC SERPL-MCNC: 47 MG/DL (ref 40–75)
HDLC SERPL: 25.3 % (ref 20–50)
LDLC SERPL CALC-MCNC: 107.2 MG/DL (ref 63–159)
NONHDLC SERPL-MCNC: 139 MG/DL
POTASSIUM SERPL-SCNC: 4.3 MMOL/L (ref 3.5–5.1)
PROT SERPL-MCNC: 7.3 G/DL (ref 6–8.4)
SODIUM SERPL-SCNC: 141 MMOL/L (ref 136–145)
TRIGL SERPL-MCNC: 159 MG/DL (ref 30–150)

## 2021-07-15 PROCEDURE — 80061 LIPID PANEL: CPT | Performed by: FAMILY MEDICINE

## 2021-07-15 PROCEDURE — 99999 PR PBB SHADOW E&M-EST. PATIENT-LVL V: CPT | Mod: PBBFAC,,, | Performed by: FAMILY MEDICINE

## 2021-07-15 PROCEDURE — 99999 PR PBB SHADOW E&M-EST. PATIENT-LVL V: ICD-10-PCS | Mod: PBBFAC,,, | Performed by: FAMILY MEDICINE

## 2021-07-15 PROCEDURE — 83036 HEMOGLOBIN GLYCOSYLATED A1C: CPT | Performed by: FAMILY MEDICINE

## 2021-07-15 PROCEDURE — 80053 COMPREHEN METABOLIC PANEL: CPT | Performed by: FAMILY MEDICINE

## 2021-07-15 PROCEDURE — 99215 OFFICE O/P EST HI 40 MIN: CPT | Mod: PBBFAC | Performed by: FAMILY MEDICINE

## 2021-07-15 PROCEDURE — 99214 OFFICE O/P EST MOD 30 MIN: CPT | Mod: S$PBB,,, | Performed by: FAMILY MEDICINE

## 2021-07-15 PROCEDURE — 36415 COLL VENOUS BLD VENIPUNCTURE: CPT | Mod: PBBFAC

## 2021-07-15 PROCEDURE — 99214 PR OFFICE/OUTPT VISIT, EST, LEVL IV, 30-39 MIN: ICD-10-PCS | Mod: S$PBB,,, | Performed by: FAMILY MEDICINE

## 2021-07-15 RX ORDER — FOLIC ACID 0.4 MG
400 TABLET ORAL 2 TIMES DAILY
COMMUNITY
End: 2023-08-07

## 2021-07-15 RX ORDER — FLUTICASONE PROPIONATE 50 MCG
2 SPRAY, SUSPENSION (ML) NASAL DAILY
COMMUNITY
Start: 2021-05-25 | End: 2022-07-25

## 2021-07-16 LAB
ESTIMATED AVG GLUCOSE: 128 MG/DL (ref 68–131)
HBA1C MFR BLD: 6.1 % (ref 4–5.6)

## 2021-11-05 ENCOUNTER — IMMUNIZATION (OUTPATIENT)
Dept: FAMILY MEDICINE | Facility: CLINIC | Age: 56
End: 2021-11-05
Payer: MEDICAID

## 2021-11-05 DIAGNOSIS — Z23 NEED FOR VACCINATION: Primary | ICD-10-CM

## 2021-11-05 PROCEDURE — 0004A COVID-19, MRNA, LNP-S, PF, 30 MCG/0.3 ML DOSE VACCINE: CPT | Mod: PBBFAC

## 2021-11-22 ENCOUNTER — TELEPHONE (OUTPATIENT)
Dept: FAMILY MEDICINE | Facility: CLINIC | Age: 56
End: 2021-11-22
Payer: MEDICAID

## 2021-12-10 ENCOUNTER — PATIENT OUTREACH (OUTPATIENT)
Dept: ADMINISTRATIVE | Facility: HOSPITAL | Age: 56
End: 2021-12-10
Payer: MEDICAID

## 2021-12-19 DIAGNOSIS — G43.009 MIGRAINE WITHOUT AURA AND WITHOUT STATUS MIGRAINOSUS, NOT INTRACTABLE: ICD-10-CM

## 2021-12-19 DIAGNOSIS — N32.81 OAB (OVERACTIVE BLADDER): ICD-10-CM

## 2021-12-20 RX ORDER — PROPRANOLOL HYDROCHLORIDE 20 MG/1
TABLET ORAL
Qty: 60 TABLET | Refills: 5 | Status: SHIPPED | OUTPATIENT
Start: 2021-12-20 | End: 2022-06-27

## 2021-12-20 RX ORDER — OXYBUTYNIN CHLORIDE 15 MG/1
TABLET, EXTENDED RELEASE ORAL
Qty: 30 TABLET | Refills: 5 | Status: SHIPPED | OUTPATIENT
Start: 2021-12-20 | End: 2022-06-16

## 2022-01-10 ENCOUNTER — TELEPHONE (OUTPATIENT)
Dept: FAMILY MEDICINE | Facility: CLINIC | Age: 57
End: 2022-01-10
Payer: MEDICAID

## 2022-01-10 DIAGNOSIS — E11.9 TYPE 2 DIABETES MELLITUS WITHOUT COMPLICATION, WITHOUT LONG-TERM CURRENT USE OF INSULIN: Primary | ICD-10-CM

## 2022-01-10 NOTE — TELEPHONE ENCOUNTER
----- Message from Ephraim Cowart sent at 1/10/2022  4:50 PM CST -----  Contact: Patient  Jessi Linton  MRN: [id number]  : 1965  PCP: David Allred  Home Phone      [phone]  Work Phone      Not on file.  Mobile          [phone]      MESSAGE: has appt scheduled for 22 -- wants to know if labs are needed- if so, will need to come tomorrow    Call [phone]    PCP: Brigida

## 2022-01-11 NOTE — TELEPHONE ENCOUNTER
Spoke to pt's mom (Esther) to scheduled lab appt. States pt has already eaten and would rather wait for appt on 1/13 to have labs taken.

## 2022-01-13 ENCOUNTER — CLINICAL SUPPORT (OUTPATIENT)
Dept: FAMILY MEDICINE | Facility: CLINIC | Age: 57
End: 2022-01-13
Payer: MEDICAID

## 2022-01-13 ENCOUNTER — OFFICE VISIT (OUTPATIENT)
Dept: FAMILY MEDICINE | Facility: CLINIC | Age: 57
End: 2022-01-13
Payer: MEDICAID

## 2022-01-13 VITALS
HEART RATE: 67 BPM | WEIGHT: 235.25 LBS | BODY MASS INDEX: 54.44 KG/M2 | HEIGHT: 55 IN | DIASTOLIC BLOOD PRESSURE: 84 MMHG | RESPIRATION RATE: 18 BRPM | OXYGEN SATURATION: 98 % | SYSTOLIC BLOOD PRESSURE: 132 MMHG

## 2022-01-13 DIAGNOSIS — E11.9 TYPE 2 DIABETES MELLITUS WITHOUT COMPLICATION, WITHOUT LONG-TERM CURRENT USE OF INSULIN: ICD-10-CM

## 2022-01-13 DIAGNOSIS — G43.009 MIGRAINE WITHOUT AURA AND WITHOUT STATUS MIGRAINOSUS, NOT INTRACTABLE: ICD-10-CM

## 2022-01-13 DIAGNOSIS — E66.01 CLASS 3 SEVERE OBESITY DUE TO EXCESS CALORIES WITHOUT SERIOUS COMORBIDITY WITH BODY MASS INDEX (BMI) OF 50.0 TO 59.9 IN ADULT: ICD-10-CM

## 2022-01-13 DIAGNOSIS — K21.9 GASTROESOPHAGEAL REFLUX DISEASE WITHOUT ESOPHAGITIS: Primary | ICD-10-CM

## 2022-01-13 DIAGNOSIS — R73.02 GLUCOSE INTOLERANCE (IMPAIRED GLUCOSE TOLERANCE): ICD-10-CM

## 2022-01-13 DIAGNOSIS — G47.33 OSA (OBSTRUCTIVE SLEEP APNEA): ICD-10-CM

## 2022-01-13 LAB
ALBUMIN SERPL BCP-MCNC: 3.6 G/DL (ref 3.5–5.2)
ALP SERPL-CCNC: 80 U/L (ref 55–135)
ALT SERPL W/O P-5'-P-CCNC: 36 U/L (ref 10–44)
ANION GAP SERPL CALC-SCNC: 8 MMOL/L (ref 8–16)
AST SERPL-CCNC: 23 U/L (ref 10–40)
BILIRUB SERPL-MCNC: 0.6 MG/DL (ref 0.1–1)
BUN SERPL-MCNC: 18 MG/DL (ref 6–20)
CALCIUM SERPL-MCNC: 9.6 MG/DL (ref 8.7–10.5)
CHLORIDE SERPL-SCNC: 101 MMOL/L (ref 95–110)
CO2 SERPL-SCNC: 31 MMOL/L (ref 23–29)
CREAT SERPL-MCNC: 0.8 MG/DL (ref 0.5–1.4)
EST. GFR  (AFRICAN AMERICAN): >60 ML/MIN/1.73 M^2
EST. GFR  (NON AFRICAN AMERICAN): >60 ML/MIN/1.73 M^2
ESTIMATED AVG GLUCOSE: 131 MG/DL (ref 68–131)
GLUCOSE SERPL-MCNC: 89 MG/DL (ref 70–110)
HBA1C MFR BLD: 6.2 % (ref 4–5.6)
POTASSIUM SERPL-SCNC: 4 MMOL/L (ref 3.5–5.1)
PROT SERPL-MCNC: 7.4 G/DL (ref 6–8.4)
SODIUM SERPL-SCNC: 140 MMOL/L (ref 136–145)

## 2022-01-13 PROCEDURE — 99214 OFFICE O/P EST MOD 30 MIN: CPT | Mod: S$PBB,,, | Performed by: FAMILY MEDICINE

## 2022-01-13 PROCEDURE — 99214 PR OFFICE/OUTPT VISIT, EST, LEVL IV, 30-39 MIN: ICD-10-PCS | Mod: S$PBB,,, | Performed by: FAMILY MEDICINE

## 2022-01-13 PROCEDURE — 3075F SYST BP GE 130 - 139MM HG: CPT | Mod: CPTII,,, | Performed by: FAMILY MEDICINE

## 2022-01-13 PROCEDURE — 90686 IIV4 VACC NO PRSV 0.5 ML IM: CPT | Mod: PBBFAC

## 2022-01-13 PROCEDURE — 1159F MED LIST DOCD IN RCRD: CPT | Mod: CPTII,,, | Performed by: FAMILY MEDICINE

## 2022-01-13 PROCEDURE — 3079F DIAST BP 80-89 MM HG: CPT | Mod: CPTII,,, | Performed by: FAMILY MEDICINE

## 2022-01-13 PROCEDURE — 1159F PR MEDICATION LIST DOCUMENTED IN MEDICAL RECORD: ICD-10-PCS | Mod: CPTII,,, | Performed by: FAMILY MEDICINE

## 2022-01-13 PROCEDURE — 3008F PR BODY MASS INDEX (BMI) DOCUMENTED: ICD-10-PCS | Mod: CPTII,,, | Performed by: FAMILY MEDICINE

## 2022-01-13 PROCEDURE — 99999 PR PBB SHADOW E&M-EST. PATIENT-LVL V: ICD-10-PCS | Mod: PBBFAC,,, | Performed by: FAMILY MEDICINE

## 2022-01-13 PROCEDURE — 3079F PR MOST RECENT DIASTOLIC BLOOD PRESSURE 80-89 MM HG: ICD-10-PCS | Mod: CPTII,,, | Performed by: FAMILY MEDICINE

## 2022-01-13 PROCEDURE — 3008F BODY MASS INDEX DOCD: CPT | Mod: CPTII,,, | Performed by: FAMILY MEDICINE

## 2022-01-13 PROCEDURE — 83036 HEMOGLOBIN GLYCOSYLATED A1C: CPT | Performed by: FAMILY MEDICINE

## 2022-01-13 PROCEDURE — 99999 PR PBB SHADOW E&M-EST. PATIENT-LVL V: CPT | Mod: PBBFAC,,, | Performed by: FAMILY MEDICINE

## 2022-01-13 PROCEDURE — 1160F PR REVIEW ALL MEDS BY PRESCRIBER/CLIN PHARMACIST DOCUMENTED: ICD-10-PCS | Mod: CPTII,,, | Performed by: FAMILY MEDICINE

## 2022-01-13 PROCEDURE — 36415 COLL VENOUS BLD VENIPUNCTURE: CPT | Mod: PBBFAC

## 2022-01-13 PROCEDURE — 3075F PR MOST RECENT SYSTOLIC BLOOD PRESS GE 130-139MM HG: ICD-10-PCS | Mod: CPTII,,, | Performed by: FAMILY MEDICINE

## 2022-01-13 PROCEDURE — 99215 OFFICE O/P EST HI 40 MIN: CPT | Mod: PBBFAC | Performed by: FAMILY MEDICINE

## 2022-01-13 PROCEDURE — 80053 COMPREHEN METABOLIC PANEL: CPT | Performed by: FAMILY MEDICINE

## 2022-01-13 PROCEDURE — 1160F RVW MEDS BY RX/DR IN RCRD: CPT | Mod: CPTII,,, | Performed by: FAMILY MEDICINE

## 2022-01-13 RX ORDER — SODIUM FLUORIDE1.1%, POTASSIUM NITRATE 5% 5.8; 57.5 MG/ML; MG/ML
GEL, DENTIFRICE DENTAL
COMMUNITY
Start: 2021-11-23

## 2022-01-13 NOTE — PROGRESS NOTES
Subjective:       Patient ID: Jessi Linton is a 56 y.o. female.    Chief Complaint: Follow-up    Pt is a 56 y.o. female who presents for evaluation and management of   Encounter Diagnoses   Name Primary?    Gastroesophageal reflux disease without esophagitis Yes    Glucose intolerance (impaired glucose tolerance)     Class 3 severe obesity due to excess calories without serious comorbidity with body mass index (BMI) of 50.0 to 59.9 in adult     MAME (obstructive sleep apnea)     Migraine without aura and without status migrainosus, not intractable    .  Doing well on current meds. Denies any side effects. Prevention is up to date.    Review of Systems   Constitutional: Negative for chills and fever.   Respiratory: Negative for shortness of breath.    Cardiovascular: Positive for leg swelling. Negative for chest pain and palpitations.   Gastrointestinal: Negative for abdominal pain, blood in stool, constipation and nausea.   Genitourinary: Negative for difficulty urinating.        Stress and urgency incontinence    Psychiatric/Behavioral: Negative for dysphoric mood, sleep disturbance and suicidal ideas. The patient is not nervous/anxious.        Objective:      Physical Exam  Constitutional:       Appearance: She is well-developed. She is obese.   HENT:      Head: Normocephalic and atraumatic.      Right Ear: External ear normal.      Left Ear: External ear normal.      Nose: Nose normal.   Eyes:      Pupils: Pupils are equal, round, and reactive to light.   Neck:      Thyroid: No thyromegaly.      Vascular: No JVD.      Trachea: No tracheal deviation.   Cardiovascular:      Rate and Rhythm: Normal rate.      Heart sounds: Normal heart sounds. No murmur heard.      Pulmonary:      Effort: Pulmonary effort is normal. No respiratory distress.      Breath sounds: Normal breath sounds. No wheezing or rales.   Chest:      Chest wall: No tenderness.   Abdominal:      General: Bowel sounds are normal. There is no  distension.      Palpations: Abdomen is soft. There is no mass.      Tenderness: There is no abdominal tenderness. There is no guarding or rebound.   Musculoskeletal:         General: No tenderness. Normal range of motion.      Cervical back: Normal range of motion and neck supple.      Right lower leg: Edema present.      Left lower leg: Edema present.   Lymphadenopathy:      Cervical: No cervical adenopathy.   Skin:     General: Skin is warm and dry.      Coloration: Skin is not pale.      Findings: No erythema or rash.   Neurological:      Mental Status: She is alert and oriented to person, place, and time.      Cranial Nerves: No cranial nerve deficit.      Motor: No abnormal muscle tone.      Coordination: Coordination normal.      Deep Tendon Reflexes: Reflexes are normal and symmetric. Reflexes normal.   Psychiatric:         Behavior: Behavior normal.         Thought Content: Thought content normal.         Judgment: Judgment normal.         Assessment:       1. Gastroesophageal reflux disease without esophagitis    2. Glucose intolerance (impaired glucose tolerance)    3. Class 3 severe obesity due to excess calories without serious comorbidity with body mass index (BMI) of 50.0 to 59.9 in adult    4. MAME (obstructive sleep apnea)    5. Migraine without aura and without status migrainosus, not intractable        Plan:   Jessi Conway was seen today for follow-up.    Diagnoses and all orders for this visit:    Gastroesophageal reflux disease without esophagitis    Glucose intolerance (impaired glucose tolerance)    Class 3 severe obesity due to excess calories without serious comorbidity with body mass index (BMI) of 50.0 to 59.9 in adult    MAME (obstructive sleep apnea)    Migraine without aura and without status migrainosus, not intractable      Problem List Items Addressed This Visit     GERD (gastroesophageal reflux disease) - Primary    Glucose intolerance (impaired glucose tolerance)    Migraine headache     Overview     Doing well on propranolol          Obesity    MAME (obstructive sleep apnea)    Overview     Compliant with cpap   benefitting from cpap            The patient is asked to make an attempt to improve diet and exercise patterns to aid in medical management of this problem.  5 small meals a day. Cut out white carbs: bread, rice, pasta, potatoes. Exercise/walk 5x/week for at least 30 minutes  .      No follow-ups on file.

## 2022-01-15 NOTE — TELEPHONE ENCOUNTER
No new care gaps identified.  Powered by [employer]. Reference number: [id number].   1/15/2022 9:23:57 AM CST

## 2022-01-20 ENCOUNTER — TELEPHONE (OUTPATIENT)
Dept: FAMILY MEDICINE | Facility: CLINIC | Age: 57
End: 2022-01-20
Payer: MEDICAID

## 2022-01-20 RX ORDER — MONTELUKAST SODIUM 10 MG/1
10 TABLET ORAL NIGHTLY
Qty: 90 TABLET | Refills: 3 | Status: SHIPPED | OUTPATIENT
Start: 2022-01-20 | End: 2023-01-26 | Stop reason: SDUPTHER

## 2022-01-20 NOTE — TELEPHONE ENCOUNTER
----- Message from James Bailon sent at 2022  9:08 AM CST -----  Contact: self  Jessi Linton  MRN: [id number]  : 1965  PCP: David Allred  Home Phone      [phone]  Work Phone      Not on file.  Mobile          [phone]      MESSAGE:     Pt returned Britni's call regarding results for BW.    Phone: [phone]

## 2022-01-20 NOTE — TELEPHONE ENCOUNTER
Refill Authorization Note   Jessi Linton  is requesting a refill authorization.  Brief Assessment and Rationale for Refill:  Approve     Medication Therapy Plan:       Medication Reconciliation Completed: No   Comments:   --->Care Gap information included below if applicable.       Requested Prescriptions   Pending Prescriptions Disp Refills    montelukast (SINGULAIR) 10 mg tablet [Pharmacy Med Name: MONTELUKAST SOD 10 MG TABLET] 90 tablet 3     Sig: Take 1 tablet (10 mg total) by mouth every evening.       Pulmonology:  Leukotriene Inhibitors Passed - 1/15/2022  9:23 AM        Passed - Patient is at least 18 years old        Passed - Valid encounter within last 15 months     Recent Visits  Date Type Provider Dept   01/13/22 Office Visit David Allred MD [facility]   07/15/21 Office Visit David Allred MD [facility]   01/14/21 Office Visit David Allred MD [facility]   07/08/20 Office Visit David Allred MD [facility]   Showing recent visits within past 720 days and meeting all other requirements  Future Appointments  No visits were found meeting these conditions.  Showing future appointments within next 150 days and meeting all other requirements      Future Appointments              In 5 months [facility]    In 5 months MD Kent[facility] [place]                    Appointments  past 12m or future 3m with PCP    Date Provider   Last Visit   1/13/2022 David Allred MD   Next Visit   1/20/2022 David Allred MD   ED visits in past 90 days: 0     Note composed:10:18 AM 01/20/2022

## 2022-01-20 NOTE — TELEPHONE ENCOUNTER
Spoke to pt, gave results, and verified understanding. Copy of labs sent by mail per pt's request. Pt verbalized understanding.

## 2022-01-22 DIAGNOSIS — R60.9 EDEMA, UNSPECIFIED TYPE: ICD-10-CM

## 2022-01-22 NOTE — TELEPHONE ENCOUNTER
No new care gaps identified.  Powered by [employer] by [employer]. Reference number: [id number].   1/22/2022 8:00:43 AM CST

## 2022-01-27 ENCOUNTER — LAB VISIT (OUTPATIENT)
Dept: FAMILY MEDICINE | Facility: CLINIC | Age: 57
End: 2022-01-27
Payer: MEDICAID

## 2022-01-27 DIAGNOSIS — Z11.52 ENCOUNTER FOR SCREENING FOR COVID-19: Primary | ICD-10-CM

## 2022-01-27 LAB
CTP QC/QA: YES
SARS-COV-2 AG RESP QL IA.RAPID: NEGATIVE

## 2022-01-27 PROCEDURE — 87811 SARS-COV-2 COVID19 W/OPTIC: CPT | Mod: PBBFAC

## 2022-01-27 RX ORDER — FUROSEMIDE 20 MG/1
TABLET ORAL
Qty: 90 TABLET | Refills: 3 | Status: SHIPPED | OUTPATIENT
Start: 2022-01-27 | End: 2023-04-25 | Stop reason: SDUPTHER

## 2022-01-27 NOTE — PROGRESS NOTES
Instructions for Patients with Confirmed or Suspected COVID-19    If you are awaiting your test result, you will either be called or it will be released to the patient portal.  If you have any questions about your test, please visit [email or web address] or call our COVID-19 information line at [phone].      Please isolate yourself at home.  You may leave home and/or return to work once the following conditions are met:    If you have symptoms and tested positive:   More than 5 days since symptoms first appeared AND   More than 24 hours fever free without medications AND       symptoms have improved   · For five days after ending isolation, masks are required.    If you had no symptoms but tested positive:   More than 5 days since the date of the first positive test. If you develop symptoms, then use the guidelines above  · For five days after ending isolation, masks are required.      Testing is not recommended if you are symptom free after completing isolation.

## 2022-01-28 NOTE — TELEPHONE ENCOUNTER
Refill Authorization Note   Jessi Linton  is requesting a refill authorization.  Brief Assessment and Rationale for Refill:  Approve     Medication Therapy Plan:       Medication Reconciliation Completed: No   Comments:   --->Care Gap information included below if applicable.   Orders Placed This Encounter    furosemide (LASIX) 20 MG tablet      Requested Prescriptions   Signed Prescriptions Disp Refills    furosemide (LASIX) 20 MG tablet 90 tablet 3     Sig: TAKE 1 TABLET BY MOUTH EVERY DAY       Cardiovascular:  Diuretics - Loop Passed - 1/22/2022  8:00 AM        Passed - Patient is at least 18 years old        Passed - Last BP in normal range within 360 days     BP Readings from Last 1 Encounters:   01/13/22 132/84               Passed - Valid encounter within last 15 months     Recent Visits  Date Type Provider Dept   01/13/22 Office Visit David Allred MD [facility]   07/15/21 Office Visit David Allred MD [facility]   01/14/21 Office Visit David Allred MD [facility]   07/08/20 Office Visit David Allred MD [facility]   Showing recent visits within past 720 days and meeting all other requirements  Future Appointments  No visits were found meeting these conditions.  Showing future appointments within next 150 days and meeting all other requirements      Future Appointments              [facility]    [facility]                Passed - K in normal range and within 360 days     Potassium   Date Value Ref Range Status   01/13/2022 4.0 3.5 - 5.1 mmol/L Final   07/15/2021 4.3 3.5 - 5.1 mmol/L Final   01/11/2021 4.2 3.5 - 5.1 mmol/L Final              Passed - Na is between 130 and 148 and within 360 days     Sodium   Date Value Ref Range Status   01/13/2022 140 136 - 145 mmol/L Final   07/15/2021 141 136 - 145 mmol/L Final   01/11/2021 140  136 - 145 mmol/L Final              Passed - Cr is 1.39 or below and within 360 days     Lab Results   Component Value Date    CREATININE 0.8 01/13/2022    CREATININE 0.8 07/15/2021    CREATININE 0.8 01/11/2021              Passed - eGFR within 360 days     Lab Results   Component Value Date    EGFRNONAA >60 01/13/2022    EGFRNONAA >60 07/15/2021    EGFRNONAA >60 01/11/2021                    Appointments  past 12m or future 3m with PCP    Date Provider   Last Visit   1/13/2022 David Allred MD   Next Visit   7/14/2022 David Allred MD   ED visits in past 90 days: 0     Note composed:7:10 PM 01/27/2022

## 2022-03-09 DIAGNOSIS — Z12.31 OTHER SCREENING MAMMOGRAM: ICD-10-CM

## 2022-04-11 DIAGNOSIS — E11.9 DIABETES MELLITUS WITHOUT COMPLICATION: ICD-10-CM

## 2022-04-11 RX ORDER — LANCETS
EACH MISCELLANEOUS
Qty: 100 EACH | Refills: 1 | Status: SHIPPED | OUTPATIENT
Start: 2022-04-11 | End: 2023-09-22 | Stop reason: SDUPTHER

## 2022-04-11 RX ORDER — DEXTROSE 4 G
1 TABLET,CHEWABLE ORAL 3 TIMES DAILY
Qty: 1 EACH | Refills: 1 | Status: SHIPPED | OUTPATIENT
Start: 2022-04-11

## 2022-04-11 NOTE — TELEPHONE ENCOUNTER
----- Message from Ephraim Cowart sent at 2022  2:10 PM CDT -----  Contact: Patient  Jessi Linton  MRN: [id number]  : 1965  PCP: David Allred  Home Phone      [phone]  Work Phone      Not on file.  Mobile          [phone]      MESSAGE: requesting Rx for new diabetes kit -- has One  Touch Delica  -- states needs meter, lancets, & test strips -- [facility] [place]    Call [phone]    PCP: Brigida

## 2022-04-11 NOTE — TELEPHONE ENCOUNTER
No new care gaps identified.  Powered by [employer] by [employer]. Reference number: [id number].   4/11/2022 3:54:55 PM CDT

## 2022-04-25 ENCOUNTER — TELEPHONE (OUTPATIENT)
Dept: FAMILY MEDICINE | Facility: CLINIC | Age: 57
End: 2022-04-25
Payer: MEDICAID

## 2022-04-25 NOTE — TELEPHONE ENCOUNTER
Pt is needing an excuse for jury duty due to not being competent enough to serve as a juror.     Please advise. Thanks

## 2022-04-25 NOTE — TELEPHONE ENCOUNTER
----- Message from Ashley Ochoa sent at 4/25/2022  8:28 AM CDT -----  Contact: self  Pt called asking for a note to be excused from jury duty.    Phone:  [phone]

## 2022-04-25 NOTE — LETTER
[address] [address]  Phone: [phone]  Fax: [phone] April 25, 2022    Jessi Linton  [address] [address]      To Whom It May Concern:    Jessi Linton is unable to participate in jury duty due to having an intellectual disability. She is not mentally competent to serve as a juror.    If you have any questions or concerns, please feel free to call my office.    Sincerely,        David Allred MD

## 2022-05-19 DIAGNOSIS — E89.40 SURGICAL MENOPAUSE: ICD-10-CM

## 2022-05-19 RX ORDER — ESTROGENS, CONJUGATED 0.3 MG/1
TABLET, FILM COATED ORAL
Qty: 30 TABLET | Refills: 5 | Status: SHIPPED | OUTPATIENT
Start: 2022-05-19 | End: 2022-11-16

## 2022-06-03 ENCOUNTER — TELEPHONE (OUTPATIENT)
Dept: FAMILY MEDICINE | Facility: CLINIC | Age: 57
End: 2022-06-03
Payer: MEDICAID

## 2022-07-08 ENCOUNTER — CLINICAL SUPPORT (OUTPATIENT)
Dept: FAMILY MEDICINE | Facility: CLINIC | Age: 57
End: 2022-07-08
Payer: MEDICAID

## 2022-07-08 DIAGNOSIS — E66.01 CLASS 3 SEVERE OBESITY DUE TO EXCESS CALORIES WITHOUT SERIOUS COMORBIDITY WITH BODY MASS INDEX (BMI) OF 50.0 TO 59.9 IN ADULT: ICD-10-CM

## 2022-07-08 DIAGNOSIS — E11.9 TYPE 2 DIABETES MELLITUS WITHOUT COMPLICATION, WITHOUT LONG-TERM CURRENT USE OF INSULIN: Primary | ICD-10-CM

## 2022-07-08 LAB
ALBUMIN SERPL BCP-MCNC: 3.6 G/DL (ref 3.5–5.2)
ALP SERPL-CCNC: 80 U/L (ref 55–135)
ALT SERPL W/O P-5'-P-CCNC: 37 U/L (ref 10–44)
ANION GAP SERPL CALC-SCNC: 9 MMOL/L (ref 8–16)
AST SERPL-CCNC: 23 U/L (ref 10–40)
BASOPHILS # BLD AUTO: 0.05 K/UL (ref 0–0.2)
BASOPHILS NFR BLD: 0.7 % (ref 0–1.9)
BILIRUB SERPL-MCNC: 0.6 MG/DL (ref 0.1–1)
BUN SERPL-MCNC: 21 MG/DL (ref 6–20)
CALCIUM SERPL-MCNC: 9.4 MG/DL (ref 8.7–10.5)
CHLORIDE SERPL-SCNC: 103 MMOL/L (ref 95–110)
CHOLEST SERPL-MCNC: 186 MG/DL (ref 120–199)
CHOLEST/HDLC SERPL: 4.2 {RATIO} (ref 2–5)
CO2 SERPL-SCNC: 30 MMOL/L (ref 23–29)
CREAT SERPL-MCNC: 0.8 MG/DL (ref 0.5–1.4)
DIFFERENTIAL METHOD: ABNORMAL
EOSINOPHIL # BLD AUTO: 0.1 K/UL (ref 0–0.5)
EOSINOPHIL NFR BLD: 1.8 % (ref 0–8)
ERYTHROCYTE [DISTWIDTH] IN BLOOD BY AUTOMATED COUNT: 13.2 % (ref 11.5–14.5)
EST. GFR  (AFRICAN AMERICAN): >60 ML/MIN/1.73 M^2
EST. GFR  (NON AFRICAN AMERICAN): >60 ML/MIN/1.73 M^2
ESTIMATED AVG GLUCOSE: 131 MG/DL (ref 68–131)
GLUCOSE SERPL-MCNC: 101 MG/DL (ref 70–110)
HBA1C MFR BLD: 6.2 % (ref 4–5.6)
HCT VFR BLD AUTO: 41.5 % (ref 37–48.5)
HDLC SERPL-MCNC: 44 MG/DL (ref 40–75)
HDLC SERPL: 23.7 % (ref 20–50)
HGB BLD-MCNC: 13.2 G/DL (ref 12–16)
IMM GRANULOCYTES # BLD AUTO: 0.02 K/UL (ref 0–0.04)
IMM GRANULOCYTES NFR BLD AUTO: 0.3 % (ref 0–0.5)
LDLC SERPL CALC-MCNC: 115.6 MG/DL (ref 63–159)
LYMPHOCYTES # BLD AUTO: 1.4 K/UL (ref 1–4.8)
LYMPHOCYTES NFR BLD: 18.7 % (ref 18–48)
MCH RBC QN AUTO: 32.4 PG (ref 27–31)
MCHC RBC AUTO-ENTMCNC: 31.8 G/DL (ref 32–36)
MCV RBC AUTO: 102 FL (ref 82–98)
MONOCYTES # BLD AUTO: 0.6 K/UL (ref 0.3–1)
MONOCYTES NFR BLD: 8.3 % (ref 4–15)
NEUTROPHILS # BLD AUTO: 5.1 K/UL (ref 1.8–7.7)
NEUTROPHILS NFR BLD: 70.2 % (ref 38–73)
NONHDLC SERPL-MCNC: 142 MG/DL
NRBC BLD-RTO: 0 /100 WBC
PLATELET # BLD AUTO: 233 K/UL (ref 150–450)
PMV BLD AUTO: 10.6 FL (ref 9.2–12.9)
POTASSIUM SERPL-SCNC: 4.5 MMOL/L (ref 3.5–5.1)
PROT SERPL-MCNC: 7 G/DL (ref 6–8.4)
RBC # BLD AUTO: 4.08 M/UL (ref 4–5.4)
SODIUM SERPL-SCNC: 142 MMOL/L (ref 136–145)
TRIGL SERPL-MCNC: 132 MG/DL (ref 30–150)
WBC # BLD AUTO: 7.27 K/UL (ref 3.9–12.7)

## 2022-07-08 PROCEDURE — 36415 COLL VENOUS BLD VENIPUNCTURE: CPT | Mod: PBBFAC

## 2022-07-08 PROCEDURE — 83036 HEMOGLOBIN GLYCOSYLATED A1C: CPT | Performed by: FAMILY MEDICINE

## 2022-07-08 PROCEDURE — 80053 COMPREHEN METABOLIC PANEL: CPT | Performed by: FAMILY MEDICINE

## 2022-07-08 PROCEDURE — 85025 COMPLETE CBC W/AUTO DIFF WBC: CPT | Performed by: FAMILY MEDICINE

## 2022-07-08 PROCEDURE — 80061 LIPID PANEL: CPT | Performed by: FAMILY MEDICINE

## 2022-07-23 NOTE — TELEPHONE ENCOUNTER
No new care gaps identified.  [facility] Embedded Care Gaps. Reference number: [id number]. 7/23/2022   11:53:02 AM CM

## 2022-07-25 RX ORDER — AZELASTINE 1 MG/ML
1 SPRAY, METERED NASAL 2 TIMES DAILY
Qty: 90 ML | Refills: 1 | Status: SHIPPED | OUTPATIENT
Start: 2022-07-25

## 2022-07-25 RX ORDER — FLUTICASONE PROPIONATE 50 MCG
SPRAY, SUSPENSION (ML) NASAL
Qty: 48 G | Refills: 1 | Status: SHIPPED | OUTPATIENT
Start: 2022-07-25

## 2022-07-25 NOTE — TELEPHONE ENCOUNTER
Refill Decision Note   Jessileandra Conway Shaila  is requesting a refill authorization.  Brief Assessment and Rationale for Refill:  Approve     Medication Therapy Plan:  FOV;    Medication Reconciliation Completed: No   Comments:     No Care Gaps recommended.     Note composed:11:30 AM 07/25/2022

## 2022-07-28 ENCOUNTER — IMMUNIZATION (OUTPATIENT)
Dept: FAMILY MEDICINE | Facility: CLINIC | Age: 57
End: 2022-07-28
Payer: MEDICAID

## 2022-07-28 ENCOUNTER — OFFICE VISIT (OUTPATIENT)
Dept: FAMILY MEDICINE | Facility: CLINIC | Age: 57
End: 2022-07-28
Payer: MEDICAID

## 2022-07-28 VITALS
WEIGHT: 233.25 LBS | RESPIRATION RATE: 18 BRPM | DIASTOLIC BLOOD PRESSURE: 86 MMHG | HEART RATE: 68 BPM | BODY MASS INDEX: 53.98 KG/M2 | OXYGEN SATURATION: 97 % | HEIGHT: 55 IN | SYSTOLIC BLOOD PRESSURE: 128 MMHG

## 2022-07-28 DIAGNOSIS — R73.02 GLUCOSE INTOLERANCE (IMPAIRED GLUCOSE TOLERANCE): Primary | ICD-10-CM

## 2022-07-28 DIAGNOSIS — E66.01 CLASS 3 SEVERE OBESITY DUE TO EXCESS CALORIES WITHOUT SERIOUS COMORBIDITY WITH BODY MASS INDEX (BMI) OF 50.0 TO 59.9 IN ADULT: ICD-10-CM

## 2022-07-28 DIAGNOSIS — Z23 NEED FOR VACCINATION: Primary | ICD-10-CM

## 2022-07-28 DIAGNOSIS — G47.33 OSA (OBSTRUCTIVE SLEEP APNEA): ICD-10-CM

## 2022-07-28 DIAGNOSIS — R60.9 DEPENDENT EDEMA: ICD-10-CM

## 2022-07-28 DIAGNOSIS — G43.009 MIGRAINE WITHOUT AURA AND WITHOUT STATUS MIGRAINOSUS, NOT INTRACTABLE: ICD-10-CM

## 2022-07-28 PROCEDURE — 3079F PR MOST RECENT DIASTOLIC BLOOD PRESSURE 80-89 MM HG: ICD-10-PCS | Mod: CPTII,,, | Performed by: FAMILY MEDICINE

## 2022-07-28 PROCEDURE — 3074F SYST BP LT 130 MM HG: CPT | Mod: CPTII,,, | Performed by: FAMILY MEDICINE

## 2022-07-28 PROCEDURE — 1159F MED LIST DOCD IN RCRD: CPT | Mod: CPTII,,, | Performed by: FAMILY MEDICINE

## 2022-07-28 PROCEDURE — 99214 OFFICE O/P EST MOD 30 MIN: CPT | Mod: PBBFAC,25 | Performed by: FAMILY MEDICINE

## 2022-07-28 PROCEDURE — 1160F RVW MEDS BY RX/DR IN RCRD: CPT | Mod: CPTII,,, | Performed by: FAMILY MEDICINE

## 2022-07-28 PROCEDURE — 3074F PR MOST RECENT SYSTOLIC BLOOD PRESSURE < 130 MM HG: ICD-10-PCS | Mod: CPTII,,, | Performed by: FAMILY MEDICINE

## 2022-07-28 PROCEDURE — 99999 PR PBB SHADOW E&M-EST. PATIENT-LVL IV: ICD-10-PCS | Mod: PBBFAC,,, | Performed by: FAMILY MEDICINE

## 2022-07-28 PROCEDURE — 3079F DIAST BP 80-89 MM HG: CPT | Mod: CPTII,,, | Performed by: FAMILY MEDICINE

## 2022-07-28 PROCEDURE — 3008F BODY MASS INDEX DOCD: CPT | Mod: CPTII,,, | Performed by: FAMILY MEDICINE

## 2022-07-28 PROCEDURE — 0054A COVID-19, MRNA, LNP-S, PF, 30 MCG/0.3 ML DOSE VACCINE (PFIZER): CPT | Mod: PBBFAC

## 2022-07-28 PROCEDURE — 3008F PR BODY MASS INDEX (BMI) DOCUMENTED: ICD-10-PCS | Mod: CPTII,,, | Performed by: FAMILY MEDICINE

## 2022-07-28 PROCEDURE — 99214 PR OFFICE/OUTPT VISIT, EST, LEVL IV, 30-39 MIN: ICD-10-PCS | Mod: S$PBB,,, | Performed by: FAMILY MEDICINE

## 2022-07-28 PROCEDURE — 99214 OFFICE O/P EST MOD 30 MIN: CPT | Mod: S$PBB,,, | Performed by: FAMILY MEDICINE

## 2022-07-28 PROCEDURE — 1159F PR MEDICATION LIST DOCUMENTED IN MEDICAL RECORD: ICD-10-PCS | Mod: CPTII,,, | Performed by: FAMILY MEDICINE

## 2022-07-28 PROCEDURE — 3044F HG A1C LEVEL LT 7.0%: CPT | Mod: CPTII,,, | Performed by: FAMILY MEDICINE

## 2022-07-28 PROCEDURE — 3044F PR MOST RECENT HEMOGLOBIN A1C LEVEL <7.0%: ICD-10-PCS | Mod: CPTII,,, | Performed by: FAMILY MEDICINE

## 2022-07-28 PROCEDURE — 1160F PR REVIEW ALL MEDS BY PRESCRIBER/CLIN PHARMACIST DOCUMENTED: ICD-10-PCS | Mod: CPTII,,, | Performed by: FAMILY MEDICINE

## 2022-07-28 PROCEDURE — 99999 PR PBB SHADOW E&M-EST. PATIENT-LVL IV: CPT | Mod: PBBFAC,,, | Performed by: FAMILY MEDICINE

## 2022-07-28 NOTE — PROGRESS NOTES
Subjective:       Patient ID: Jessi Linton is a 56 y.o. female.    Chief Complaint: Follow-up    Pt is a 56 y.o. female who presents for evaluation and management of   Encounter Diagnoses   Name Primary?    Glucose intolerance (impaired glucose tolerance) Yes    Migraine without aura and without status migrainosus, not intractable     MAME (obstructive sleep apnea)     Dependent edema     Class 3 severe obesity due to excess calories without serious comorbidity with body mass index (BMI) of 50.0 to 59.9 in adult    .  Doing well on current meds. Denies any side effects. Prevention is up to date.    Review of Systems   Constitutional: Negative for chills and fever.   Respiratory: Negative for shortness of breath.    Cardiovascular: Negative for chest pain and palpitations.   Gastrointestinal: Negative for abdominal pain, blood in stool, constipation and nausea.   Genitourinary: Negative for difficulty urinating.   Neurological: Negative for headaches.   Psychiatric/Behavioral: Negative for dysphoric mood, sleep disturbance and suicidal ideas. The patient is not nervous/anxious.        Objective:      Physical Exam  Constitutional:       Appearance: She is well-developed. She is obese.   HENT:      Head: Normocephalic and atraumatic.      Right Ear: External ear normal.      Left Ear: External ear normal.      Nose: Nose normal.   Eyes:      Pupils: Pupils are equal, round, and reactive to light.   Neck:      Thyroid: No thyromegaly.      Vascular: No JVD.      Trachea: No tracheal deviation.   Cardiovascular:      Rate and Rhythm: Normal rate.      Heart sounds: Normal heart sounds. No murmur heard.  Pulmonary:      Effort: Pulmonary effort is normal. No respiratory distress.      Breath sounds: Normal breath sounds. No wheezing or rales.   Chest:      Chest wall: No tenderness.   Abdominal:      General: Bowel sounds are normal. There is no distension.      Palpations: Abdomen is soft. There is no mass.       Tenderness: There is no abdominal tenderness. There is no guarding or rebound.   Musculoskeletal:         General: No tenderness. Normal range of motion.      Cervical back: Normal range of motion and neck supple.      Right lower leg: Edema present.      Left lower leg: Edema present.   Lymphadenopathy:      Cervical: No cervical adenopathy.   Skin:     General: Skin is warm and dry.      Coloration: Skin is not pale.      Findings: No erythema or rash.   Neurological:      Mental Status: She is alert and oriented to person, place, and time.      Cranial Nerves: No cranial nerve deficit.      Motor: No abnormal muscle tone.      Coordination: Coordination normal.      Deep Tendon Reflexes: Reflexes are normal and symmetric. Reflexes normal.   Psychiatric:         Behavior: Behavior normal.         Thought Content: Thought content normal.         Judgment: Judgment normal.         Assessment:       1. Glucose intolerance (impaired glucose tolerance)    2. Migraine without aura and without status migrainosus, not intractable    3. MAME (obstructive sleep apnea)    4. Dependent edema    5. Class 3 severe obesity due to excess calories without serious comorbidity with body mass index (BMI) of 50.0 to 59.9 in adult        Plan:   Jessi Conway was seen today for follow-up.    Diagnoses and all orders for this visit:    Glucose intolerance (impaired glucose tolerance)  -     Comprehensive Metabolic Panel; Future  -     Hemoglobin A1C; Future    Migraine without aura and without status migrainosus, not intractable    MAME (obstructive sleep apnea)    Dependent edema    Class 3 severe obesity due to excess calories without serious comorbidity with body mass index (BMI) of 50.0 to 59.9 in adult      Problem List Items Addressed This Visit     Dependent edema    Glucose intolerance (impaired glucose tolerance) - Primary    Overview     Lab Results   Component Value Date    HGBA1C 6.2 (H) 07/08/2022                Relevant  Orders    Comprehensive Metabolic Panel    Hemoglobin A1C    Migraine headache    Overview     Doing well on propranolol            Obesity    MAME (obstructive sleep apnea)    Overview     Compliant with cpap   benefitting from cpap                    No follow-ups on file.

## 2022-10-24 ENCOUNTER — TELEPHONE (OUTPATIENT)
Dept: FAMILY MEDICINE | Facility: CLINIC | Age: 57
End: 2022-10-24
Payer: MEDICAID

## 2022-10-24 NOTE — TELEPHONE ENCOUNTER
----- Message from Yulissa Jebleandra sent at 10/24/2022  1:19 PM CDT -----  Contact: bibi/mother  Jessi Linton  MRN: [id number]  : 1965  PCP: David Allred  Home Phone      [phone]  Work Phone      Not on file.  Mobile          [phone]      MESSAGE:   Pt mother stating [employer] stating that they need orders from us. When asked what they were trying to receive she was unclear--saying it may be the filters for her c-pap. Mother seems confused and lacking information. States maybe we should call them to find out.     [phone]

## 2022-10-26 NOTE — TELEPHONE ENCOUNTER
Contacted [employer] , states pt went yesterday to have cpap and supplies given.Notified pt, states she will contact clinic if needing order for any new supplies.

## 2022-12-06 DIAGNOSIS — N32.81 OAB (OVERACTIVE BLADDER): ICD-10-CM

## 2022-12-06 RX ORDER — OXYBUTYNIN CHLORIDE 15 MG/1
TABLET, EXTENDED RELEASE ORAL
Qty: 90 TABLET | Refills: 2 | Status: SHIPPED | OUTPATIENT
Start: 2022-12-06 | End: 2023-04-17

## 2022-12-06 NOTE — TELEPHONE ENCOUNTER
No new care gaps identified.  [facility] Embedded Care Gaps. Reference number: [id number]. 12/06/2022   12:14:38 AM CST

## 2022-12-06 NOTE — TELEPHONE ENCOUNTER
Refill Decision Note   Jessileandra Conway Shaila  is requesting a refill authorization.  Brief Assessment and Rationale for Refill:  Approve     Medication Therapy Plan:       Medication Reconciliation Completed: No   Comments:     No Care Gaps recommended.     Note composed:5:05 PM 12/06/2022

## 2022-12-29 DIAGNOSIS — G43.009 MIGRAINE WITHOUT AURA AND WITHOUT STATUS MIGRAINOSUS, NOT INTRACTABLE: ICD-10-CM

## 2022-12-29 RX ORDER — PROPRANOLOL HYDROCHLORIDE 20 MG/1
20 TABLET ORAL 2 TIMES DAILY
Qty: 60 TABLET | Refills: 5 | Status: SHIPPED | OUTPATIENT
Start: 2022-12-29 | End: 2023-07-17 | Stop reason: SDUPTHER

## 2022-12-29 NOTE — TELEPHONE ENCOUNTER
No new care gaps identified.  [facility] Embedded Care Gaps. Reference number: [id number]. 12/29/2022   3:03:44 PM CST

## 2022-12-29 NOTE — TELEPHONE ENCOUNTER
LOV:22  PT IS REQUESTING THE PENDED MEDICATION  PLEASE ADVISE              ----- Message from Ephraim Cowart sent at 2022  1:14 PM CST -----  Contact: Patient  Jessi Linton  MRN: [id number]  : 1965  PCP: David Allred  Home Phone      [phone]  Work Phone      Not on file.  Mobile          [phone]      MESSAGE:   Pt requesting refill or new Rx.   Is this a refill or new RX:  refill   RX name and strength: Propanolol  Last office visit: 22  Is this a 30-day or 90-day RX:  30 day  [facility] [place]  Comments:      Phone:  [phone]    PCP: Brigida

## 2023-01-12 ENCOUNTER — CLINICAL SUPPORT (OUTPATIENT)
Dept: FAMILY MEDICINE | Facility: CLINIC | Age: 58
End: 2023-01-12
Payer: MEDICAID

## 2023-01-12 DIAGNOSIS — R73.02 GLUCOSE INTOLERANCE (IMPAIRED GLUCOSE TOLERANCE): ICD-10-CM

## 2023-01-12 LAB
ALBUMIN SERPL BCP-MCNC: 3.5 G/DL (ref 3.5–5.2)
ALP SERPL-CCNC: 69 U/L (ref 55–135)
ALT SERPL W/O P-5'-P-CCNC: 49 U/L (ref 10–44)
ANION GAP SERPL CALC-SCNC: 12 MMOL/L (ref 8–16)
AST SERPL-CCNC: 29 U/L (ref 10–40)
BILIRUB SERPL-MCNC: 0.5 MG/DL (ref 0.1–1)
BUN SERPL-MCNC: 17 MG/DL (ref 6–20)
CALCIUM SERPL-MCNC: 9.9 MG/DL (ref 8.7–10.5)
CHLORIDE SERPL-SCNC: 101 MMOL/L (ref 95–110)
CO2 SERPL-SCNC: 28 MMOL/L (ref 23–29)
CREAT SERPL-MCNC: 0.8 MG/DL (ref 0.5–1.4)
EST. GFR  (NO RACE VARIABLE): >60 ML/MIN/1.73 M^2
ESTIMATED AVG GLUCOSE: 126 MG/DL (ref 68–131)
GLUCOSE SERPL-MCNC: 107 MG/DL (ref 70–110)
HBA1C MFR BLD: 6 % (ref 4–5.6)
POTASSIUM SERPL-SCNC: 4.5 MMOL/L (ref 3.5–5.1)
PROT SERPL-MCNC: 7.2 G/DL (ref 6–8.4)
SODIUM SERPL-SCNC: 141 MMOL/L (ref 136–145)

## 2023-01-12 PROCEDURE — 80053 COMPREHEN METABOLIC PANEL: CPT | Performed by: FAMILY MEDICINE

## 2023-01-12 PROCEDURE — 83036 HEMOGLOBIN GLYCOSYLATED A1C: CPT | Performed by: FAMILY MEDICINE

## 2023-01-12 PROCEDURE — 36415 COLL VENOUS BLD VENIPUNCTURE: CPT | Mod: PBBFAC

## 2023-01-18 ENCOUNTER — CLINICAL SUPPORT (OUTPATIENT)
Dept: FAMILY MEDICINE | Facility: CLINIC | Age: 58
End: 2023-01-18
Payer: MEDICAID

## 2023-01-18 ENCOUNTER — OFFICE VISIT (OUTPATIENT)
Dept: FAMILY MEDICINE | Facility: CLINIC | Age: 58
End: 2023-01-18
Payer: MEDICAID

## 2023-01-18 VITALS
WEIGHT: 234 LBS | HEART RATE: 70 BPM | HEIGHT: 55 IN | SYSTOLIC BLOOD PRESSURE: 128 MMHG | BODY MASS INDEX: 54.15 KG/M2 | DIASTOLIC BLOOD PRESSURE: 74 MMHG

## 2023-01-18 DIAGNOSIS — R60.9 EDEMA, UNSPECIFIED TYPE: ICD-10-CM

## 2023-01-18 DIAGNOSIS — K21.9 GASTROESOPHAGEAL REFLUX DISEASE WITHOUT ESOPHAGITIS: ICD-10-CM

## 2023-01-18 DIAGNOSIS — R06.02 SOB (SHORTNESS OF BREATH): ICD-10-CM

## 2023-01-18 DIAGNOSIS — G47.33 OSA (OBSTRUCTIVE SLEEP APNEA): ICD-10-CM

## 2023-01-18 DIAGNOSIS — R03.0 ELEVATED BLOOD PRESSURE, SITUATIONAL: Primary | ICD-10-CM

## 2023-01-18 DIAGNOSIS — R73.02 GLUCOSE INTOLERANCE (IMPAIRED GLUCOSE TOLERANCE): ICD-10-CM

## 2023-01-18 DIAGNOSIS — E66.01 CLASS 3 SEVERE OBESITY DUE TO EXCESS CALORIES WITHOUT SERIOUS COMORBIDITY WITH BODY MASS INDEX (BMI) OF 50.0 TO 59.9 IN ADULT: ICD-10-CM

## 2023-01-18 LAB — BNP SERPL-MCNC: 35 PG/ML (ref 0–99)

## 2023-01-18 PROCEDURE — 99999 PR PBB SHADOW E&M-EST. PATIENT-LVL IV: ICD-10-PCS | Mod: PBBFAC,,, | Performed by: FAMILY MEDICINE

## 2023-01-18 PROCEDURE — 1159F PR MEDICATION LIST DOCUMENTED IN MEDICAL RECORD: ICD-10-PCS | Mod: CPTII,,, | Performed by: FAMILY MEDICINE

## 2023-01-18 PROCEDURE — 3008F PR BODY MASS INDEX (BMI) DOCUMENTED: ICD-10-PCS | Mod: CPTII,,, | Performed by: FAMILY MEDICINE

## 2023-01-18 PROCEDURE — 1160F RVW MEDS BY RX/DR IN RCRD: CPT | Mod: CPTII,,, | Performed by: FAMILY MEDICINE

## 2023-01-18 PROCEDURE — 90686 IIV4 VACC NO PRSV 0.5 ML IM: CPT | Mod: PBBFAC

## 2023-01-18 PROCEDURE — 83880 ASSAY OF NATRIURETIC PEPTIDE: CPT | Performed by: FAMILY MEDICINE

## 2023-01-18 PROCEDURE — 3078F DIAST BP <80 MM HG: CPT | Mod: CPTII,,, | Performed by: FAMILY MEDICINE

## 2023-01-18 PROCEDURE — 99214 OFFICE O/P EST MOD 30 MIN: CPT | Mod: S$PBB,,, | Performed by: FAMILY MEDICINE

## 2023-01-18 PROCEDURE — 3078F PR MOST RECENT DIASTOLIC BLOOD PRESSURE < 80 MM HG: ICD-10-PCS | Mod: CPTII,,, | Performed by: FAMILY MEDICINE

## 2023-01-18 PROCEDURE — 3074F SYST BP LT 130 MM HG: CPT | Mod: CPTII,,, | Performed by: FAMILY MEDICINE

## 2023-01-18 PROCEDURE — 99999 PR PBB SHADOW E&M-EST. PATIENT-LVL IV: CPT | Mod: PBBFAC,,, | Performed by: FAMILY MEDICINE

## 2023-01-18 PROCEDURE — 3008F BODY MASS INDEX DOCD: CPT | Mod: CPTII,,, | Performed by: FAMILY MEDICINE

## 2023-01-18 PROCEDURE — 99214 PR OFFICE/OUTPT VISIT, EST, LEVL IV, 30-39 MIN: ICD-10-PCS | Mod: S$PBB,,, | Performed by: FAMILY MEDICINE

## 2023-01-18 PROCEDURE — 3044F HG A1C LEVEL LT 7.0%: CPT | Mod: CPTII,,, | Performed by: FAMILY MEDICINE

## 2023-01-18 PROCEDURE — 3044F PR MOST RECENT HEMOGLOBIN A1C LEVEL <7.0%: ICD-10-PCS | Mod: CPTII,,, | Performed by: FAMILY MEDICINE

## 2023-01-18 PROCEDURE — 1160F PR REVIEW ALL MEDS BY PRESCRIBER/CLIN PHARMACIST DOCUMENTED: ICD-10-PCS | Mod: CPTII,,, | Performed by: FAMILY MEDICINE

## 2023-01-18 PROCEDURE — 99214 OFFICE O/P EST MOD 30 MIN: CPT | Mod: PBBFAC | Performed by: FAMILY MEDICINE

## 2023-01-18 PROCEDURE — 1159F MED LIST DOCD IN RCRD: CPT | Mod: CPTII,,, | Performed by: FAMILY MEDICINE

## 2023-01-18 PROCEDURE — 3074F PR MOST RECENT SYSTOLIC BLOOD PRESSURE < 130 MM HG: ICD-10-PCS | Mod: CPTII,,, | Performed by: FAMILY MEDICINE

## 2023-01-18 PROCEDURE — 36415 COLL VENOUS BLD VENIPUNCTURE: CPT | Mod: PBBFAC

## 2023-01-18 NOTE — PROGRESS NOTES
Subjective:       Patient ID: Jessi Linton is a 57 y.o. female.    Chief Complaint: Follow-up (Pt here for 6 month follow up, Mom would like her L foot checked. She states it has been swelling ) and Foot Pain (L foot pain )    Pt is a 57 y.o. female who presents for evaluation and management of   Encounter Diagnoses   Name Primary?    Elevated blood pressure, situational Yes    Gastroesophageal reflux disease without esophagitis     Class 3 severe obesity due to excess calories without serious comorbidity with body mass index (BMI) of 50.0 to 59.9 in adult     MAME (obstructive sleep apnea)     Glucose intolerance (impaired glucose tolerance)     Edema, unspecified type     SOB (shortness of breath)    .  Doing well on current meds. Denies any side effects. Prevention is up to date.  Review of Systems   Constitutional:  Negative for chills and fever.   Respiratory:  Positive for shortness of breath.    Cardiovascular:  Positive for leg swelling. Negative for chest pain and palpitations.   Gastrointestinal:  Negative for abdominal pain, blood in stool, constipation and nausea.   Genitourinary:  Negative for difficulty urinating.   Psychiatric/Behavioral:  Negative for dysphoric mood, sleep disturbance and suicidal ideas. The patient is not nervous/anxious.      Objective:      Physical Exam  Constitutional:       Appearance: She is well-developed.   HENT:      Head: Normocephalic and atraumatic.      Right Ear: External ear normal.      Left Ear: External ear normal.      Nose: Nose normal.   Eyes:      Pupils: Pupils are equal, round, and reactive to light.   Neck:      Thyroid: No thyromegaly.      Vascular: No JVD.      Trachea: No tracheal deviation.   Cardiovascular:      Rate and Rhythm: Normal rate.      Heart sounds: Normal heart sounds. No murmur heard.  Pulmonary:      Effort: Pulmonary effort is normal. No respiratory distress.      Breath sounds: Normal breath sounds. No wheezing or rales.   Chest:       Chest wall: No tenderness.   Abdominal:      General: Bowel sounds are normal. There is no distension.      Palpations: Abdomen is soft. There is no mass.      Tenderness: There is no abdominal tenderness. There is no guarding or rebound.   Musculoskeletal:         General: No tenderness. Normal range of motion.      Cervical back: Normal range of motion and neck supple.   Lymphadenopathy:      Cervical: No cervical adenopathy.   Skin:     General: Skin is warm and dry.      Coloration: Skin is not pale.      Findings: No erythema or rash.   Neurological:      Mental Status: She is alert and oriented to person, place, and time.      Cranial Nerves: No cranial nerve deficit.      Motor: No abnormal muscle tone.      Coordination: Coordination normal.      Deep Tendon Reflexes: Reflexes are normal and symmetric. Reflexes normal.   Psychiatric:         Behavior: Behavior normal.         Thought Content: Thought content normal.         Judgment: Judgment normal.       Assessment:       1. Elevated blood pressure, situational    2. Gastroesophageal reflux disease without esophagitis    3. Class 3 severe obesity due to excess calories without serious comorbidity with body mass index (BMI) of 50.0 to 59.9 in adult    4. MAME (obstructive sleep apnea)    5. Glucose intolerance (impaired glucose tolerance)    6. Edema, unspecified type    7. SOB (shortness of breath)          Plan:   1. Elevated blood pressure, situational    2. Gastroesophageal reflux disease without esophagitis    3. Class 3 severe obesity due to excess calories without serious comorbidity with body mass index (BMI) of 50.0 to 59.9 in adult    4. MAME (obstructive sleep apnea)  Overview:  Compliant with cpap   benefitting from cpap       5. Glucose intolerance (impaired glucose tolerance)  Overview:  Lab Results   Component Value Date    HGBA1C 6.0 (H) 01/12/2023     The patient is asked to make an attempt to improve diet and exercise patterns to aid in  medical management of this problem.  Cut out white carbs: bread, rice, pasta, potatoes. Exercise/walk 5x/week for at least 30 minutes  .        6. Edema, unspecified type    7. SOB (shortness of breath)  -     BNP; Future; Expected date: 01/18/2023      No follow-ups on file.

## 2023-01-19 ENCOUNTER — TELEPHONE (OUTPATIENT)
Dept: FAMILY MEDICINE | Facility: CLINIC | Age: 58
End: 2023-01-19
Payer: MEDICAID

## 2023-01-19 NOTE — TELEPHONE ENCOUNTER
----- Message from David Allred MD sent at 1/18/2023  3:28 PM CST -----  Test results normal  No evidence of heart failure

## 2023-01-23 ENCOUNTER — IMMUNIZATION (OUTPATIENT)
Dept: FAMILY MEDICINE | Facility: CLINIC | Age: 58
End: 2023-01-23
Payer: MEDICAID

## 2023-01-23 DIAGNOSIS — Z23 NEED FOR VACCINATION: Primary | ICD-10-CM

## 2023-01-23 PROCEDURE — 91312 COVID-19, MRNA, LNP-S, BIVALENT BOOSTER, PF, 30 MCG/0.3 ML DOSE: CPT | Mod: PBBFAC

## 2023-01-23 PROCEDURE — 0124A COVID-19, MRNA, LNP-S, BIVALENT BOOSTER, PF, 30 MCG/0.3 ML DOSE: CPT | Mod: PBBFAC

## 2023-01-26 RX ORDER — MONTELUKAST SODIUM 10 MG/1
10 TABLET ORAL NIGHTLY
Qty: 90 TABLET | Refills: 3 | Status: SHIPPED | OUTPATIENT
Start: 2023-01-26 | End: 2024-01-17 | Stop reason: SDUPTHER

## 2023-01-26 NOTE — TELEPHONE ENCOUNTER
LOV: 01/18/23    Med refill request: montelukast (SINGULAIR) 10 mg tablet    Med pended. Thanks

## 2023-01-26 NOTE — TELEPHONE ENCOUNTER
No new care gaps identified.  [facility] Embedded Care Gaps. Reference number: [id number]. 1/26/2023   3:25:25 PM CST

## 2023-01-26 NOTE — TELEPHONE ENCOUNTER
----- Message from Ashley Ochoa sent at 2023  8:50 AM CST -----  Contact: self  Jessi Linton  MRN: [id number]  : 1965  PCP: David Allred  Home Phone      [phone]  Work Phone      Not on file.  Mobile          [phone]      MESSAGE:   Pt requesting refill or new Rx.   Is this a refill or new RX:  refill  RX name and strength:  montelukast (SINGULAIR) 10 mg tablet  Last office visit: 2023  Is this a 30-day or 90-day RX:  90  [facility]  Comments:      Phone:   [phone]

## 2023-01-28 ENCOUNTER — HOSPITAL ENCOUNTER (EMERGENCY)
Facility: HOSPITAL | Age: 58
Discharge: HOME | End: 2023-01-28
Attending: SURGERY
Payer: MEDICAID

## 2023-01-28 VITALS
BODY MASS INDEX: 54.15 KG/M2 | HEIGHT: 55 IN | WEIGHT: 234 LBS | DIASTOLIC BLOOD PRESSURE: 74 MMHG | SYSTOLIC BLOOD PRESSURE: 171 MMHG | TEMPERATURE: 98 F | HEART RATE: 76 BPM | RESPIRATION RATE: 16 BRPM | OXYGEN SATURATION: 95 %

## 2023-01-28 DIAGNOSIS — S42.292A CLOSED FRACTURE OF HEAD OF LEFT HUMERUS, INITIAL ENCOUNTER: ICD-10-CM

## 2023-01-28 DIAGNOSIS — F43.9 STRESS: Primary | ICD-10-CM

## 2023-01-28 PROCEDURE — 63600175 PHARM REV CODE 636 W HCPCS: Performed by: SURGERY

## 2023-01-28 PROCEDURE — 99284 EMERGENCY DEPT VISIT MOD MDM: CPT | Mod: 25

## 2023-01-28 PROCEDURE — 96372 THER/PROPH/DIAG INJ SC/IM: CPT | Performed by: SURGERY

## 2023-01-28 RX ORDER — ONDANSETRON 2 MG/ML
4 INJECTION INTRAMUSCULAR; INTRAVENOUS
Status: COMPLETED | OUTPATIENT
Start: 2023-01-28 | End: 2023-01-28

## 2023-01-28 RX ORDER — MEPERIDINE HYDROCHLORIDE 25 MG/ML
25 INJECTION INTRAMUSCULAR; INTRAVENOUS; SUBCUTANEOUS
Status: COMPLETED | OUTPATIENT
Start: 2023-01-28 | End: 2023-01-28

## 2023-01-28 RX ORDER — HYDROCODONE BITARTRATE AND ACETAMINOPHEN 5; 325 MG/1; MG/1
1 TABLET ORAL EVERY 4 HOURS PRN
Qty: 15 TABLET | Refills: 0 | Status: SHIPPED | OUTPATIENT
Start: 2023-01-28 | End: 2023-01-31

## 2023-01-28 RX ADMIN — MEPERIDINE HYDROCHLORIDE 25 MG: 25 INJECTION INTRAMUSCULAR; INTRAVENOUS; SUBCUTANEOUS at 09:01

## 2023-01-28 RX ADMIN — ONDANSETRON HYDROCHLORIDE 4 MG: 2 INJECTION, SOLUTION INTRAMUSCULAR; INTRAVENOUS at 09:01

## 2023-01-28 NOTE — ED PROVIDER NOTES
Encounter Date: 1/28/2023       History     Chief Complaint   Patient presents with    Fall     Pt to er following a trip and fall. Pt has a lac to the nose and is complaining of left shoulder pain. Denies loc. Denies blood thinners.     Jessi Linton is a 57 y.o. female with left shoulder pain in the ER today  Patient had a trip & fall with left shoulder pain afterwards, possible fracture  Patient denies any loss of consciousness, superficial abrasions on the nose  Alert, appropriate, not on blood thinners with no obvious severe head trauma  Patient with no neck pain, only complaint is left shoulder pain on ED interview  Patient able to ambulate without difficulty; good stable vital signs in the ER    Review of patient's allergies indicates:   Allergen Reactions    Tramadol Rash     Past Medical History:   Diagnosis Date    Asthma     COPD with acute bronchitis 12/8/2017    Diabetes mellitus     Pre-diabetes (monitors sugars 3 x week)    Elevated blood pressure, situational 6/28/2019    GERD (gastroesophageal reflux disease)     Hiatal hernia     with reflux    Kidney stone     Migraine headache     Screen for colon cancer     Sleep apnea      Past Surgical History:   Procedure Laterality Date    HYSTERECTOMY      MILA- BSO    OOPHORECTOMY       Family History   Problem Relation Age of Onset    Glaucoma Mother     Cancer Father     Heart disease Maternal Grandmother     Heart disease Maternal Grandfather     Diabetes Maternal Grandfather      Social History     Tobacco Use    Smoking status: Never    Smokeless tobacco: Never   Substance Use Topics    Alcohol use: No    Drug use: No     Review of Systems   Constitutional: Negative.    HENT: Negative.     Eyes: Negative.    Respiratory: Negative.     Cardiovascular: Negative.    Gastrointestinal: Negative.    Genitourinary:  Negative for dysuria, urgency and vaginal discharge.   Musculoskeletal:         (+) left shoulder pain   Skin:         (+) Superficial  abrasion to the bridge of the nose   Neurological: Negative.    Psychiatric/Behavioral: Negative.     All other systems reviewed and are negative.    Physical Exam     Initial Vitals [01/28/23 0901]   BP Pulse Resp Temp SpO2   (!) 171/74 76 17 98 °F (36.7 °C) 95 %      MAP       --         Physical Exam    Nursing note and vitals reviewed.  Constitutional: Vital signs are normal. She appears well-developed and well-nourished. She is cooperative.   HENT:   Head: Normocephalic and atraumatic.   Right Ear: External ear normal.   Left Ear: External ear normal.   Nose: Nose normal.   Mouth/Throat: Oropharynx is clear and moist.   Eyes: Conjunctivae, EOM and lids are normal. Pupils are equal, round, and reactive to light.   Neck: Trachea normal and phonation normal. Neck supple. No JVD present.   Normal range of motion.   Full passive range of motion without pain.     Cardiovascular:  Normal rate, regular rhythm, S1 normal, S2 normal, normal heart sounds, intact distal pulses and normal pulses.           Pulmonary/Chest: Effort normal and breath sounds normal.   Abdominal: Abdomen is soft and flat. Bowel sounds are normal.   Musculoskeletal:      Cervical back: Full passive range of motion without pain, normal range of motion and neck supple.      Comments: (+) limited range of motion left shoulder with no gross deformity       Neurological: She is alert and oriented to person, place, and time. She has normal strength.   Skin: Skin is warm, dry and intact. Capillary refill takes less than 2 seconds.       ED Course   Procedures  Labs Reviewed - No data to display       Imaging Results               X-Ray Shoulder 2 or More Views Left (Final result)  Result time 01/28/23 09:41:23      Final result by Juan C Ludwig MD (01/28/23 09:41:23)                   Impression:      Comminuted displaced oblique fracture of the humeral head and neck with mild valgus angulation.    This report was flagged in Epic as  abnormal.      Electronically signed by: Juan C Ludwig  Date:    01/28/2023  Time:    09:41               Narrative:    EXAMINATION:  XR SHOULDER COMPLETE 2 OR MORE VIEWS LEFT    CLINICAL HISTORY:  Left shoulder pain with fall;    TECHNIQUE:  Two or three views of the left shoulder were performed.    COMPARISON:  06/24/2019.    FINDINGS:  There is a comminuted displaced oblique fracture of the humeral head and neck.  There is mild valgus angulation.  There is adjacent soft tissue swelling.  Humeral head remains normally positioned within the glenoid cavity.    AC joint intact.                                    Laceration Repair  -- Performed by: YAMINI ARMANDO  -- Consent Done: Emergent Situation  -- Body area: Nasal bridge  -- Laceration length: 1 centimeter  -- Tendon involvement: none  -- Nerve involvement: none  -- Vascular damage: no  -- Amount of cleaning: standard  -- Skin closure: Dermabond        Medications   ondansetron injection 4 mg (has no administration in time range)   meperidine (PF) injection 25 mg (has no administration in time range)     Medical Decision Making  57-year-old female with an awkward fall hitting her left shoulder today  Obvious pain, suspect fracture with no obvious dislocation noted today  No head trauma, loss of consciousness with superficial nasal abrasion    Problems Addressed:  Closed fracture of head of left humerus, initial encounter: complicated acute illness or injury    Amount and/or Complexity of Data Reviewed  Radiology: ordered. Decision-making details documented in ED Course.    Risk  Risk Details: Left shoulder pain with obvious fracture on clinical evaluation today  Placed in shoulder immobilizer with ambulatory referral to orthopedics  Pain control today, orthopedic follow-up, mother voiced understanding   Return to ER with any concerning signs symptoms after discharge today                                 Clinical Impression:   Final diagnoses:  [N21.409W]  Closed fracture of head of left humerus, initial encounter (Primary)        ED Disposition Condition    Discharge Stable          ED Prescriptions       Medication Sig Dispense Start Date End Date Auth. Provider    HYDROcodone-acetaminophen (NORCO) 5-325 mg per tablet Take 1 tablet by mouth every 4 (four) hours as needed for Pain. 15 tablet 1/28/2023 1/31/2023 Dann Salmeron MD          Follow-up Information       Follow up With Specialties Details Why Contact Info    David Allred MD Family Medicine Schedule an appointment as soon as possible for a visit in 2 days  [address]  [facility] [address]  [phone]      Jaycee Adam PA-C Orthopedic Surgery Go in 2 days  [address]  [place] [address]  [phone]                 Dann Salmeron MD  01/28/23 0956

## 2023-01-30 ENCOUNTER — OFFICE VISIT (OUTPATIENT)
Dept: FAMILY MEDICINE | Facility: CLINIC | Age: 58
End: 2023-01-30
Payer: MEDICAID

## 2023-01-30 ENCOUNTER — HOSPITAL ENCOUNTER (OUTPATIENT)
Dept: RADIOLOGY | Facility: HOSPITAL | Age: 58
Discharge: HOME | End: 2023-01-30
Attending: FAMILY MEDICINE
Payer: MEDICAID

## 2023-01-30 VITALS
SYSTOLIC BLOOD PRESSURE: 160 MMHG | WEIGHT: 227.94 LBS | DIASTOLIC BLOOD PRESSURE: 96 MMHG | OXYGEN SATURATION: 98 % | BODY MASS INDEX: 52.75 KG/M2 | RESPIRATION RATE: 12 BRPM | HEIGHT: 55 IN | HEART RATE: 75 BPM

## 2023-01-30 DIAGNOSIS — G44.311 INTRACTABLE ACUTE POST-TRAUMATIC HEADACHE: Primary | ICD-10-CM

## 2023-01-30 DIAGNOSIS — G44.311 INTRACTABLE ACUTE POST-TRAUMATIC HEADACHE: ICD-10-CM

## 2023-01-30 DIAGNOSIS — S42.292A CLOSED FRACTURE OF HEAD OF LEFT HUMERUS, INITIAL ENCOUNTER: ICD-10-CM

## 2023-01-30 DIAGNOSIS — R11.2 NAUSEA AND VOMITING, UNSPECIFIED VOMITING TYPE: ICD-10-CM

## 2023-01-30 PROCEDURE — 3008F BODY MASS INDEX DOCD: CPT | Mod: CPTII,,, | Performed by: FAMILY MEDICINE

## 2023-01-30 PROCEDURE — 1159F MED LIST DOCD IN RCRD: CPT | Mod: CPTII,,, | Performed by: FAMILY MEDICINE

## 2023-01-30 PROCEDURE — 70450 CT HEAD/BRAIN W/O DYE: CPT | Mod: TC

## 2023-01-30 PROCEDURE — 70450 CT HEAD WITHOUT CONTRAST: ICD-10-PCS | Mod: 26,,, | Performed by: RADIOLOGY

## 2023-01-30 PROCEDURE — 1159F PR MEDICATION LIST DOCUMENTED IN MEDICAL RECORD: ICD-10-PCS | Mod: CPTII,,, | Performed by: FAMILY MEDICINE

## 2023-01-30 PROCEDURE — 3080F DIAST BP >= 90 MM HG: CPT | Mod: CPTII,,, | Performed by: FAMILY MEDICINE

## 2023-01-30 PROCEDURE — 3080F PR MOST RECENT DIASTOLIC BLOOD PRESSURE >= 90 MM HG: ICD-10-PCS | Mod: CPTII,,, | Performed by: FAMILY MEDICINE

## 2023-01-30 PROCEDURE — 99214 OFFICE O/P EST MOD 30 MIN: CPT | Mod: S$PBB,,, | Performed by: FAMILY MEDICINE

## 2023-01-30 PROCEDURE — 3044F PR MOST RECENT HEMOGLOBIN A1C LEVEL <7.0%: ICD-10-PCS | Mod: CPTII,,, | Performed by: FAMILY MEDICINE

## 2023-01-30 PROCEDURE — 99999 PR PBB SHADOW E&M-EST. PATIENT-LVL V: CPT | Mod: PBBFAC,,, | Performed by: FAMILY MEDICINE

## 2023-01-30 PROCEDURE — 99215 OFFICE O/P EST HI 40 MIN: CPT | Mod: PBBFAC,25 | Performed by: FAMILY MEDICINE

## 2023-01-30 PROCEDURE — 99214 PR OFFICE/OUTPT VISIT, EST, LEVL IV, 30-39 MIN: ICD-10-PCS | Mod: S$PBB,,, | Performed by: FAMILY MEDICINE

## 2023-01-30 PROCEDURE — 70450 CT HEAD/BRAIN W/O DYE: CPT | Mod: 26,,, | Performed by: RADIOLOGY

## 2023-01-30 PROCEDURE — 3044F HG A1C LEVEL LT 7.0%: CPT | Mod: CPTII,,, | Performed by: FAMILY MEDICINE

## 2023-01-30 PROCEDURE — 99999 PR PBB SHADOW E&M-EST. PATIENT-LVL V: ICD-10-PCS | Mod: PBBFAC,,, | Performed by: FAMILY MEDICINE

## 2023-01-30 PROCEDURE — 3077F PR MOST RECENT SYSTOLIC BLOOD PRESSURE >= 140 MM HG: ICD-10-PCS | Mod: CPTII,,, | Performed by: FAMILY MEDICINE

## 2023-01-30 PROCEDURE — 3008F PR BODY MASS INDEX (BMI) DOCUMENTED: ICD-10-PCS | Mod: CPTII,,, | Performed by: FAMILY MEDICINE

## 2023-01-30 PROCEDURE — 3077F SYST BP >= 140 MM HG: CPT | Mod: CPTII,,, | Performed by: FAMILY MEDICINE

## 2023-01-30 RX ORDER — ONDANSETRON 8 MG/1
8 TABLET, ORALLY DISINTEGRATING ORAL EVERY 8 HOURS PRN
Qty: 30 TABLET | Refills: 0 | Status: SHIPPED | OUTPATIENT
Start: 2023-01-30 | End: 2023-08-07 | Stop reason: SDUPTHER

## 2023-01-30 NOTE — PROGRESS NOTES
Subjective:       Patient ID: Jessi Linton is a 57 y.o. female.    Chief Complaint: Emesis (Pt taken a fall 1/28/2023. Pt has been having numbness in her hand. Pt states she has been vomiting and nausea.)    Pt is a 57 y.o. female who presents for evaluation and management of   Encounter Diagnoses   Name Primary?    Intractable acute post-traumatic headache Yes    Closed fracture of head of left humerus, initial encounter     Nausea and vomiting, unspecified vomiting type    .  Doing well on current meds. Denies any side effects. Prevention is up to date.  .  Review of Systems   Constitutional:  Negative for fever.   Respiratory:  Negative for shortness of breath.    Cardiovascular:  Negative for chest pain.   Gastrointestinal:  Positive for nausea and vomiting.   Musculoskeletal:         Left shoulder and arm pain    Neurological:  Positive for weakness, numbness and headaches.     Objective:      Physical Exam  Constitutional:       Appearance: She is well-developed. She is obese.   HENT:      Head: Normocephalic and atraumatic.      Right Ear: External ear normal.      Left Ear: External ear normal.      Nose: Nose normal.   Eyes:      Pupils: Pupils are equal, round, and reactive to light.      Comments: [place] eyes    Neck:      Thyroid: No thyromegaly.      Vascular: No JVD.      Trachea: No tracheal deviation.   Cardiovascular:      Rate and Rhythm: Normal rate.      Heart sounds: Normal heart sounds. No murmur heard.  Pulmonary:      Effort: Pulmonary effort is normal. No respiratory distress.      Breath sounds: Normal breath sounds. No wheezing or rales.   Chest:      Chest wall: No tenderness.   Abdominal:      General: Bowel sounds are normal. There is no distension.      Palpations: Abdomen is soft. There is no mass.      Tenderness: There is no abdominal tenderness. There is no guarding or rebound.   Musculoskeletal:         General: Tenderness present.      Cervical back: Normal range of motion  and neck supple.      Comments: Left UE in sling      Lymphadenopathy:      Cervical: No cervical adenopathy.   Skin:     General: Skin is warm and dry.      Coloration: Skin is not pale.      Findings: No erythema or rash.   Neurological:      Mental Status: She is alert and oriented to person, place, and time.      Cranial Nerves: No cranial nerve deficit.      Motor: No abnormal muscle tone.      Coordination: Coordination normal.      Deep Tendon Reflexes: Reflexes are normal and symmetric. Reflexes normal.   Psychiatric:         Behavior: Behavior normal.         Thought Content: Thought content normal.         Judgment: Judgment normal.       Assessment:       1. Intractable acute post-traumatic headache    2. Closed fracture of head of left humerus, initial encounter    3. Nausea and vomiting, unspecified vomiting type          Plan:   1. Intractable acute post-traumatic headache  -     CT Head Without Contrast; Future; Expected date: 01/30/2023  -     ondansetron (ZOFRAN-ODT) 8 MG TbDL; Take 1 tablet (8 mg total) by mouth every 8 (eight) hours as needed (nausea).  Dispense: 30 tablet; Refill: 0    2. Closed fracture of head of left humerus, initial encounter  -     Ambulatory referral/consult to Orthopedics; Future; Expected date: 02/06/2023    3. Nausea and vomiting, unspecified vomiting type  -     ondansetron (ZOFRAN-ODT) 8 MG TbDL; Take 1 tablet (8 mg total) by mouth every 8 (eight) hours as needed (nausea).  Dispense: 30 tablet; Refill: 0      No follow-ups on file.

## 2023-02-01 ENCOUNTER — OFFICE VISIT (OUTPATIENT)
Dept: ORTHOPEDICS | Facility: CLINIC | Age: 58
End: 2023-02-01
Payer: MEDICAID

## 2023-02-01 VITALS
WEIGHT: 227.06 LBS | OXYGEN SATURATION: 99 % | SYSTOLIC BLOOD PRESSURE: 122 MMHG | BODY MASS INDEX: 52.55 KG/M2 | HEIGHT: 55 IN | DIASTOLIC BLOOD PRESSURE: 82 MMHG | HEART RATE: 76 BPM

## 2023-02-01 DIAGNOSIS — S42.292A CLOSED FRACTURE OF HEAD OF LEFT HUMERUS, INITIAL ENCOUNTER: ICD-10-CM

## 2023-02-01 PROCEDURE — 1159F MED LIST DOCD IN RCRD: CPT | Mod: CPTII,,, | Performed by: PHYSICIAN ASSISTANT

## 2023-02-01 PROCEDURE — 99999 PR PBB SHADOW E&M-EST. PATIENT-LVL V: CPT | Mod: PBBFAC,,, | Performed by: PHYSICIAN ASSISTANT

## 2023-02-01 PROCEDURE — 99999 PR PBB SHADOW E&M-EST. PATIENT-LVL V: ICD-10-PCS | Mod: PBBFAC,,, | Performed by: PHYSICIAN ASSISTANT

## 2023-02-01 PROCEDURE — 3074F PR MOST RECENT SYSTOLIC BLOOD PRESSURE < 130 MM HG: ICD-10-PCS | Mod: CPTII,,, | Performed by: PHYSICIAN ASSISTANT

## 2023-02-01 PROCEDURE — 99204 PR OFFICE/OUTPT VISIT, NEW, LEVL IV, 45-59 MIN: ICD-10-PCS | Mod: S$PBB,,, | Performed by: PHYSICIAN ASSISTANT

## 2023-02-01 PROCEDURE — 1160F PR REVIEW ALL MEDS BY PRESCRIBER/CLIN PHARMACIST DOCUMENTED: ICD-10-PCS | Mod: CPTII,,, | Performed by: PHYSICIAN ASSISTANT

## 2023-02-01 PROCEDURE — 3074F SYST BP LT 130 MM HG: CPT | Mod: CPTII,,, | Performed by: PHYSICIAN ASSISTANT

## 2023-02-01 PROCEDURE — 3079F DIAST BP 80-89 MM HG: CPT | Mod: CPTII,,, | Performed by: PHYSICIAN ASSISTANT

## 2023-02-01 PROCEDURE — 3044F HG A1C LEVEL LT 7.0%: CPT | Mod: CPTII,,, | Performed by: PHYSICIAN ASSISTANT

## 2023-02-01 PROCEDURE — 3044F PR MOST RECENT HEMOGLOBIN A1C LEVEL <7.0%: ICD-10-PCS | Mod: CPTII,,, | Performed by: PHYSICIAN ASSISTANT

## 2023-02-01 PROCEDURE — 1159F PR MEDICATION LIST DOCUMENTED IN MEDICAL RECORD: ICD-10-PCS | Mod: CPTII,,, | Performed by: PHYSICIAN ASSISTANT

## 2023-02-01 PROCEDURE — 1160F RVW MEDS BY RX/DR IN RCRD: CPT | Mod: CPTII,,, | Performed by: PHYSICIAN ASSISTANT

## 2023-02-01 PROCEDURE — 3079F PR MOST RECENT DIASTOLIC BLOOD PRESSURE 80-89 MM HG: ICD-10-PCS | Mod: CPTII,,, | Performed by: PHYSICIAN ASSISTANT

## 2023-02-01 PROCEDURE — 99204 OFFICE O/P NEW MOD 45 MIN: CPT | Mod: S$PBB,,, | Performed by: PHYSICIAN ASSISTANT

## 2023-02-01 PROCEDURE — 3008F PR BODY MASS INDEX (BMI) DOCUMENTED: ICD-10-PCS | Mod: CPTII,,, | Performed by: PHYSICIAN ASSISTANT

## 2023-02-01 PROCEDURE — 3008F BODY MASS INDEX DOCD: CPT | Mod: CPTII,,, | Performed by: PHYSICIAN ASSISTANT

## 2023-02-01 PROCEDURE — 99215 OFFICE O/P EST HI 40 MIN: CPT | Mod: PBBFAC | Performed by: PHYSICIAN ASSISTANT

## 2023-02-01 NOTE — PROGRESS NOTES
Subjective:      Patient ID: Jessi Linton is a 57 y.o. female.    Chief Complaint: Pain, Injury, and Swelling of the Left Shoulder; Numbness and Swelling of the Left Hand; and Injury and Pain of the Left Upper Arm    Review of patient's allergies indicates:   Allergen Reactions    Tramadol Rash      58 yo RHD F presents to clinic with parents with c/o left shoulder pain.  She tripped and fell at home a few days ago, landed on her left shooulder.  Was seen in ED after injury and diagnosed with proximal humerus fx.  Has been wearing sling since.  Mom noticed bruising to pt's upper arm.  She has tried taking pain medication prescribed by ED but makes her feel sick so has been taking tylenol arthritis with good relief of pain.  Occasional numbness/tingling to fingers when wearing sling.      Review of Systems   Constitutional: Negative for chills, diaphoresis and fever.   HENT:  Negative for congestion, ear discharge and ear pain.    Eyes:  Negative for blurred vision, discharge, double vision and pain.   Cardiovascular:  Negative for chest pain, claudication and cyanosis.   Respiratory:  Negative for cough, hemoptysis and shortness of breath.    Endocrine: Negative for cold intolerance and heat intolerance.   Skin:  Negative for color change, dry skin, itching and rash.   Musculoskeletal:  Positive for falls and joint pain. Negative for arthritis, back pain, gout, joint swelling, muscle weakness and neck pain.   Gastrointestinal:  Negative for abdominal pain and change in bowel habit.   Neurological:  Negative for brief paralysis, disturbances in coordination and dizziness.   Psychiatric/Behavioral:  Negative for altered mental status and depression.        Objective:          General    Constitutional: She is oriented to person, place, and time. She appears well-developed and well-nourished. No distress.   HENT:   Head: Atraumatic.   Eyes: EOM are normal. Right eye exhibits no discharge. Left eye exhibits no  discharge.   Cardiovascular:  Normal rate.            Pulmonary/Chest: Effort normal. No respiratory distress.   Abdominal: Soft.   Neurological: She is alert and oriented to person, place, and time.   Psychiatric: She has a normal mood and affect. Her behavior is normal.         Right Shoulder Exam     Inspection/Observation   Swelling: absent  Bruising: absent  Scars: absent  Deformity: absent    Other   Sensation: normal    Left Shoulder Exam     Inspection/Observation   Swelling: absent  Bruising: present (upper arm)  Scars: absent  Deformity: absent    Other   Sensation: normal     Comments:  Tenderness to anterior shoulder    Limited exam due to fx and decreased ROM      Vascular Exam     Right Pulses      Radial:                    2+      Left Pulses      Radial:                    2+      Capillary Refill  Right Hand: normal capillary refill  Left Hand: normal capillary refill                Assessment:         Xray Left Shoulder 1/28/23:  Comminuted displaced oblique fracture of the humeral head and neck with mild valgus angulation.      Encounter Diagnosis   Name Primary?    Closed fracture of head of left humerus, initial encounter     Closed fracture of head of left humerus, initial encounter  -     Ambulatory referral/consult to Orthopedics  -     X-Ray Shoulder 2 or More Views Left; Future; Expected date: 02/01/2023               Plan:         Discussed diagnosis and treatment plan with pt and her parents. Xrays reviewed by Dr. Guerra who recommends treatment in sling for now and start PT in 3-4 weeks.    Sling as directed.  Continue tylenol as directed as needed.  Ice compresses as directed.  RTC in 2-3 weeks for follow up with repeat xrays, sooner if needed.    Patient voices understanding of and agreement with treatment plan. All of the patient's questions were answered and the patient will contact us if she has any questions or concerns in the interim.

## 2023-02-13 ENCOUNTER — TELEPHONE (OUTPATIENT)
Dept: ORTHOPEDICS | Facility: CLINIC | Age: 58
End: 2023-02-13

## 2023-02-13 ENCOUNTER — TELEPHONE (OUTPATIENT)
Dept: FAMILY MEDICINE | Facility: CLINIC | Age: 58
End: 2023-02-13
Payer: MEDICAID

## 2023-02-13 NOTE — TELEPHONE ENCOUNTER
iS SHE VOMITING? HEADACHE?   What does she mean it makes her sick. What are her symptoms?  Thanks!

## 2023-02-13 NOTE — TELEPHONE ENCOUNTER
----- Message from Nayeli Greenfield sent at 2023 11:31 AM CST -----  Contact: Kassandra/mother  Jessi Linton  MRN: [id number]  : 1965  PCP: David Allred  Home Phone      [phone]  Work Phone      Not on file.  Mobile          [phone]      MESSAGE: Have question and concerns asking for someone to return her  call      Phone [phone]

## 2023-02-13 NOTE — TELEPHONE ENCOUNTER
Pt states had a fall on 01/28/23 and has been experiencing nausea since then. Has tried zofran but that also makes her sick.

## 2023-02-14 NOTE — TELEPHONE ENCOUNTER
Spoke to pt, states no vomiting, headache or other s/s. States only gagging sensation yesterday but states it was only in the morning after she swallowed Zofran whole instead of sublingual per usual. States pt is taking Tylenol, Zofran, and slept well last night. States no s/s this morning. Advised to contact office if s/s occur again. Pt verbalized understanding.

## 2023-02-15 ENCOUNTER — HOSPITAL ENCOUNTER (EMERGENCY)
Facility: HOSPITAL | Age: 58
Discharge: HOME | End: 2023-02-15
Attending: STUDENT IN AN ORGANIZED HEALTH CARE EDUCATION/TRAINING PROGRAM
Payer: MEDICAID

## 2023-02-15 ENCOUNTER — TELEPHONE (OUTPATIENT)
Dept: FAMILY MEDICINE | Facility: CLINIC | Age: 58
End: 2023-02-15
Payer: MEDICAID

## 2023-02-15 VITALS
TEMPERATURE: 99 F | HEART RATE: 65 BPM | WEIGHT: 223.75 LBS | OXYGEN SATURATION: 99 % | DIASTOLIC BLOOD PRESSURE: 70 MMHG | SYSTOLIC BLOOD PRESSURE: 156 MMHG | BODY MASS INDEX: 52.01 KG/M2 | RESPIRATION RATE: 18 BRPM

## 2023-02-15 DIAGNOSIS — R07.9 ACUTE CHEST PAIN: Primary | ICD-10-CM

## 2023-02-15 DIAGNOSIS — R11.0 NAUSEA: ICD-10-CM

## 2023-02-15 LAB
ALBUMIN SERPL BCP-MCNC: 3.5 G/DL (ref 3.5–5.2)
ALP SERPL-CCNC: 114 U/L (ref 55–135)
ALT SERPL W/O P-5'-P-CCNC: 34 U/L (ref 10–44)
ANION GAP SERPL CALC-SCNC: 12 MMOL/L (ref 8–16)
AST SERPL-CCNC: 22 U/L (ref 10–40)
BASOPHILS # BLD AUTO: 0.05 K/UL (ref 0–0.2)
BASOPHILS NFR BLD: 0.6 % (ref 0–1.9)
BILIRUB SERPL-MCNC: 0.5 MG/DL (ref 0.1–1)
BNP SERPL-MCNC: 23 PG/ML (ref 0–99)
BUN SERPL-MCNC: 24 MG/DL (ref 6–20)
CALCIUM SERPL-MCNC: 9.8 MG/DL (ref 8.7–10.5)
CHLORIDE SERPL-SCNC: 103 MMOL/L (ref 95–110)
CO2 SERPL-SCNC: 26 MMOL/L (ref 23–29)
CREAT SERPL-MCNC: 0.9 MG/DL (ref 0.5–1.4)
DIFFERENTIAL METHOD: ABNORMAL
EOSINOPHIL # BLD AUTO: 0.1 K/UL (ref 0–0.5)
EOSINOPHIL NFR BLD: 1.5 % (ref 0–8)
ERYTHROCYTE [DISTWIDTH] IN BLOOD BY AUTOMATED COUNT: 13.7 % (ref 11.5–14.5)
EST. GFR  (NO RACE VARIABLE): >60 ML/MIN/1.73 M^2
GLUCOSE SERPL-MCNC: 121 MG/DL (ref 70–110)
HCT VFR BLD AUTO: 40.9 % (ref 37–48.5)
HGB BLD-MCNC: 12.9 G/DL (ref 12–16)
IMM GRANULOCYTES # BLD AUTO: 0.03 K/UL (ref 0–0.04)
IMM GRANULOCYTES NFR BLD AUTO: 0.4 % (ref 0–0.5)
LYMPHOCYTES # BLD AUTO: 1.3 K/UL (ref 1–4.8)
LYMPHOCYTES NFR BLD: 15.3 % (ref 18–48)
MCH RBC QN AUTO: 32.7 PG (ref 27–31)
MCHC RBC AUTO-ENTMCNC: 31.5 G/DL (ref 32–36)
MCV RBC AUTO: 104 FL (ref 82–98)
MONOCYTES # BLD AUTO: 0.6 K/UL (ref 0.3–1)
MONOCYTES NFR BLD: 7 % (ref 4–15)
NEUTROPHILS # BLD AUTO: 6.3 K/UL (ref 1.8–7.7)
NEUTROPHILS NFR BLD: 75.2 % (ref 38–73)
NRBC BLD-RTO: 0 /100 WBC
PLATELET # BLD AUTO: 287 K/UL (ref 150–450)
PMV BLD AUTO: 9.4 FL (ref 9.2–12.9)
POTASSIUM SERPL-SCNC: 4.7 MMOL/L (ref 3.5–5.1)
PROT SERPL-MCNC: 7.4 G/DL (ref 6–8.4)
RBC # BLD AUTO: 3.95 M/UL (ref 4–5.4)
SODIUM SERPL-SCNC: 141 MMOL/L (ref 136–145)
TROPONIN I SERPL DL<=0.01 NG/ML-MCNC: <0.006 NG/ML (ref 0–0.03)
WBC # BLD AUTO: 8.39 K/UL (ref 3.9–12.7)

## 2023-02-15 PROCEDURE — 93005 ELECTROCARDIOGRAM TRACING: CPT

## 2023-02-15 PROCEDURE — 36415 COLL VENOUS BLD VENIPUNCTURE: CPT | Performed by: STUDENT IN AN ORGANIZED HEALTH CARE EDUCATION/TRAINING PROGRAM

## 2023-02-15 PROCEDURE — 93010 ELECTROCARDIOGRAM REPORT: CPT | Mod: ,,, | Performed by: INTERNAL MEDICINE

## 2023-02-15 PROCEDURE — 85025 COMPLETE CBC W/AUTO DIFF WBC: CPT | Performed by: STUDENT IN AN ORGANIZED HEALTH CARE EDUCATION/TRAINING PROGRAM

## 2023-02-15 PROCEDURE — 83880 ASSAY OF NATRIURETIC PEPTIDE: CPT | Performed by: STUDENT IN AN ORGANIZED HEALTH CARE EDUCATION/TRAINING PROGRAM

## 2023-02-15 PROCEDURE — 99900035 HC TECH TIME PER 15 MIN (STAT)

## 2023-02-15 PROCEDURE — 96372 THER/PROPH/DIAG INJ SC/IM: CPT | Performed by: STUDENT IN AN ORGANIZED HEALTH CARE EDUCATION/TRAINING PROGRAM

## 2023-02-15 PROCEDURE — 25000003 PHARM REV CODE 250: Performed by: STUDENT IN AN ORGANIZED HEALTH CARE EDUCATION/TRAINING PROGRAM

## 2023-02-15 PROCEDURE — 84484 ASSAY OF TROPONIN QUANT: CPT | Performed by: STUDENT IN AN ORGANIZED HEALTH CARE EDUCATION/TRAINING PROGRAM

## 2023-02-15 PROCEDURE — 80053 COMPREHEN METABOLIC PANEL: CPT | Performed by: STUDENT IN AN ORGANIZED HEALTH CARE EDUCATION/TRAINING PROGRAM

## 2023-02-15 PROCEDURE — 93010 EKG 12-LEAD: ICD-10-PCS | Mod: ,,, | Performed by: INTERNAL MEDICINE

## 2023-02-15 PROCEDURE — 99285 EMERGENCY DEPT VISIT HI MDM: CPT | Mod: 25

## 2023-02-15 PROCEDURE — 99900031 HC PATIENT EDUCATION (STAT)

## 2023-02-15 PROCEDURE — 63600175 PHARM REV CODE 636 W HCPCS: Performed by: STUDENT IN AN ORGANIZED HEALTH CARE EDUCATION/TRAINING PROGRAM

## 2023-02-15 RX ORDER — ONDANSETRON 8 MG/1
8 TABLET, ORALLY DISINTEGRATING ORAL
Status: COMPLETED | OUTPATIENT
Start: 2023-02-15 | End: 2023-02-15

## 2023-02-15 RX ORDER — KETOROLAC TROMETHAMINE 30 MG/ML
15 INJECTION, SOLUTION INTRAMUSCULAR; INTRAVENOUS
Status: COMPLETED | OUTPATIENT
Start: 2023-02-15 | End: 2023-02-15

## 2023-02-15 RX ADMIN — ONDANSETRON 8 MG: 8 TABLET, ORALLY DISINTEGRATING ORAL at 04:02

## 2023-02-15 RX ADMIN — KETOROLAC TROMETHAMINE 15 MG: 30 INJECTION, SOLUTION INTRAMUSCULAR; INTRAVENOUS at 04:02

## 2023-02-15 NOTE — ED PROVIDER NOTES
"Encounter Date: 2/15/2023    This document was partially completed using speech recognition software and may contain misspellings, grammatical errors, and/or unexpected word substitutions.       History     Chief Complaint   Patient presents with    Nausea     Pt c/o off and on nausea for 2 days.  Pt fell  2 weeks ago and Fx left shoulder.  Pt states nausea has been worsening.  Denies fever, and vomiting.   Pt Notified  dr. Allred of situation over the past 2 days.       57 year old female with a PMHx of asthma, COPD presents to the ED with her mother and father with chest pain, left shoulder pain, nausea. Has a fall 2 weeks ago and fractured her left humerus. Reports over the past 2 days, she's had nausea intermittent. No sick contacts, no suspicious foods. Denies abdominal pain, diarrhea. Started having chest pain this morning so came to the ED. Reports the chest pain as a "pain" without specifying much more. Constant, worse with palpation. Mom denies 1st degree relatives with heart disease. Was told by her PCP that nausea may be a concussion symptom. Hasn't taken any nausea medication today.    1/28/23 Left shoulder x-ray: Comminuted displaced oblique fracture of the humeral head and neck with mild valgus angulation    1/30/23 CT head: 1. No CT evidence of acute intracranial findings. 2. Generalized age concordant cerebral atrophy with evidence of superimposed chronic deep white matter ischemia. 3. Small polypoid mucous retention cyst arising from the inferior floor right maxillary sinus with supplementary chronic mucosal thickening along the inferior floor the right maxillary antrum.      Review of patient's allergies indicates:   Allergen Reactions    Tramadol Rash     Past Medical History:   Diagnosis Date    Asthma     COPD with acute bronchitis 12/8/2017    Diabetes mellitus     Pre-diabetes (monitors sugars 3 x week)    Elevated blood pressure, situational 6/28/2019    GERD (gastroesophageal reflux " disease)     Hiatal hernia     with reflux    Kidney stone     Migraine headache     Screen for colon cancer     Sleep apnea      Past Surgical History:   Procedure Laterality Date    HYSTERECTOMY      MILA- BSO    OOPHORECTOMY       Family History   Problem Relation Age of Onset    Glaucoma Mother     Cancer Father     Heart disease Maternal Grandmother     Heart disease Maternal Grandfather     Diabetes Maternal Grandfather      Social History     Tobacco Use    Smoking status: Never    Smokeless tobacco: Never   Substance Use Topics    Alcohol use: No    Drug use: No     Review of Systems   Constitutional:  Negative for chills and fever.   HENT:  Negative for congestion, rhinorrhea and sneezing.    Eyes:  Negative for discharge and redness.   Respiratory:  Negative for cough and shortness of breath.    Cardiovascular:  Positive for chest pain. Negative for palpitations.   Gastrointestinal:  Positive for nausea. Negative for abdominal pain, diarrhea and vomiting.   Genitourinary:  Negative for dysuria, frequency, vaginal bleeding and vaginal discharge.   Musculoskeletal:  Positive for arthralgias (left shoulder). Negative for back pain and neck pain.   Skin:  Negative for rash and wound.   Neurological:  Negative for weakness, numbness and headaches.     Physical Exam     Initial Vitals [02/15/23 0345]   BP Pulse Resp Temp SpO2   (!) 178/77 67 18 98.6 °F (37 °C) 98 %      MAP       --         Physical Exam    Nursing note and vitals reviewed.  Constitutional: She appears well-developed. She is not diaphoretic. No distress.   HENT:   Head: Normocephalic and atraumatic.   Right Ear: External ear normal.   Left Ear: External ear normal.   Eyes: Right eye exhibits no discharge. Left eye exhibits no discharge. No scleral icterus.   Neck: Neck supple.   Cardiovascular:  Normal rate and regular rhythm.           Pulmonary/Chest: Breath sounds normal. No stridor. No respiratory distress. She has no wheezes. She has no  rhonchi. She has no rales. She exhibits tenderness.   Abdominal: Abdomen is soft. There is no abdominal tenderness. There is no guarding.   Musculoskeletal:         General: No edema.      Left shoulder: Tenderness and bony tenderness present. Decreased range of motion.      Cervical back: Neck supple.     Neurological: She is alert and oriented to person, place, and time.   Skin: Skin is warm and dry. Capillary refill takes less than 2 seconds.   Psychiatric: She has a normal mood and affect.       ED Course   Procedures  Labs Reviewed   CBC W/ AUTO DIFFERENTIAL - Abnormal; Notable for the following components:       Result Value    RBC 3.95 (*)      (*)     MCH 32.7 (*)     MCHC 31.5 (*)     Gran % 75.2 (*)     Lymph % 15.3 (*)     All other components within normal limits   COMPREHENSIVE METABOLIC PANEL - Abnormal; Notable for the following components:    Glucose 121 (*)     BUN 24 (*)     All other components within normal limits   TROPONIN I   B-TYPE NATRIURETIC PEPTIDE     EKG Readings: (Independently Interpreted)   Previous EKG: Compared with most recent EKG Previous EKG Date: 10/26/2016.   NSR at 68 bpm. Normal axis. Normal intervals. Low voltage QRS. No STEMI.     Imaging Results              X-Ray Chest 1 View (In process)                   X-Rays:   Independently Interpreted Readings:   Chest X-Ray: Normal heart size.  No infiltrates.  No acute abnormalities.   Medications   ondansetron disintegrating tablet 8 mg (8 mg Oral Given 2/15/23 7986)   ketorolac injection 15 mg (15 mg Intramuscular Given 2/15/23 3515)     Medical Decision Making:   Differential Diagnosis:   Ddx: costochondritis, atypical ACS, PTX, PNA, concussion  ED Management:  Based on the patient's evaluation - patient appears well for discharge home. Negative CXR, Trop, ECG. Reproducible chest wall pain on palpation. Suspect MSK/costochondritis. Treating with NSAIDs. Still has had nausea intermittently since head injury - possible  concussion. Educated mom and patient concussion symptoms can vary from days to weeks. Will d/c home with PCP f/u. Patient and mom are in agreement.                        Clinical Impression:   Final diagnoses:  [R07.9] Acute chest pain (Primary)  [R11.0] Nausea        ED Disposition Condition    Discharge Stable          ED Prescriptions    None       Follow-up Information       Follow up With Specialties Details Why Contact Info    David Allred MD Family Medicine Schedule an appointment as soon as possible for a visit in 2 days As needed [address]  [facility] [address]  [phone]               Steve Yates DO  02/15/23 7576

## 2023-02-15 NOTE — TELEPHONE ENCOUNTER
----- Message from Ephraim Cowart sent at 2/15/2023  8:09 AM CST -----  Contact: Mom - Kassandra Linton  MRN: [id number]  : 1965  PCP: David Allred  Home Phone      [phone]  Work Phone      Not on file.  Mobile          [phone]      MESSAGE: seen in ER last night -- concussion -- requesting appt with Dr Allred Friday     Call Kassandra @ [phone]    PCP: Brigida

## 2023-02-16 ENCOUNTER — TELEPHONE (OUTPATIENT)
Dept: FAMILY MEDICINE | Facility: CLINIC | Age: 58
End: 2023-02-16
Payer: MEDICAID

## 2023-02-16 NOTE — TELEPHONE ENCOUNTER
----- Message from Ephraim Cowart sent at 2023  9:39 AM CST -----  Contact: Mom - Kassandra Linton  MRN: [id number]  : 1965  PCP: David Allred  Home Phone      [phone]  Work Phone      Not on file.  Mobile          [phone]      MESSAGE: concussion - another bout of nausea - medication states every 8 hrs -- requesting something else -- please advise    Call Kasasndra @ [phone]    PCP: Brigida

## 2023-02-16 NOTE — TELEPHONE ENCOUNTER
Spoke to pt's mother (Kassandra), states Zofran 8 mg is not helping pt with nausea. No other s/s. Requesting if something else can be sent in. Also requesting if a med can be given to relax pt down due to having trouble sleeping and feeling uncomfortable throughout  the day. Pt was seen in ER on 02/15.     Please advise. Thanks     Pharm: [facility]

## 2023-02-17 RX ORDER — PROCHLORPERAZINE MALEATE 10 MG
10 TABLET ORAL EVERY 8 HOURS PRN
Qty: 30 TABLET | Refills: 1 | Status: SHIPPED | OUTPATIENT
Start: 2023-02-17

## 2023-02-20 ENCOUNTER — OFFICE VISIT (OUTPATIENT)
Dept: FAMILY MEDICINE | Facility: CLINIC | Age: 58
End: 2023-02-20
Payer: MEDICAID

## 2023-02-20 VITALS
WEIGHT: 228.38 LBS | OXYGEN SATURATION: 97 % | SYSTOLIC BLOOD PRESSURE: 126 MMHG | HEART RATE: 68 BPM | RESPIRATION RATE: 12 BRPM | DIASTOLIC BLOOD PRESSURE: 82 MMHG | HEIGHT: 55 IN | BODY MASS INDEX: 52.85 KG/M2

## 2023-02-20 DIAGNOSIS — S06.0X0D CONCUSSION WITHOUT LOSS OF CONSCIOUSNESS, SUBSEQUENT ENCOUNTER: ICD-10-CM

## 2023-02-20 DIAGNOSIS — B37.2 INTERTRIGINOUS CANDIDIASIS: ICD-10-CM

## 2023-02-20 DIAGNOSIS — R11.0 NAUSEA: Primary | ICD-10-CM

## 2023-02-20 DIAGNOSIS — R07.89 OTHER CHEST PAIN: ICD-10-CM

## 2023-02-20 PROCEDURE — 1159F PR MEDICATION LIST DOCUMENTED IN MEDICAL RECORD: ICD-10-PCS | Mod: CPTII,,, | Performed by: FAMILY MEDICINE

## 2023-02-20 PROCEDURE — 3044F PR MOST RECENT HEMOGLOBIN A1C LEVEL <7.0%: ICD-10-PCS | Mod: CPTII,,, | Performed by: FAMILY MEDICINE

## 2023-02-20 PROCEDURE — 3044F HG A1C LEVEL LT 7.0%: CPT | Mod: CPTII,,, | Performed by: FAMILY MEDICINE

## 2023-02-20 PROCEDURE — 1159F MED LIST DOCD IN RCRD: CPT | Mod: CPTII,,, | Performed by: FAMILY MEDICINE

## 2023-02-20 PROCEDURE — 3008F PR BODY MASS INDEX (BMI) DOCUMENTED: ICD-10-PCS | Mod: CPTII,,, | Performed by: FAMILY MEDICINE

## 2023-02-20 PROCEDURE — 99999 PR PBB SHADOW E&M-EST. PATIENT-LVL IV: ICD-10-PCS | Mod: PBBFAC,,, | Performed by: FAMILY MEDICINE

## 2023-02-20 PROCEDURE — 3079F DIAST BP 80-89 MM HG: CPT | Mod: CPTII,,, | Performed by: FAMILY MEDICINE

## 2023-02-20 PROCEDURE — 3074F SYST BP LT 130 MM HG: CPT | Mod: CPTII,,, | Performed by: FAMILY MEDICINE

## 2023-02-20 PROCEDURE — 3074F PR MOST RECENT SYSTOLIC BLOOD PRESSURE < 130 MM HG: ICD-10-PCS | Mod: CPTII,,, | Performed by: FAMILY MEDICINE

## 2023-02-20 PROCEDURE — 99214 OFFICE O/P EST MOD 30 MIN: CPT | Mod: PBBFAC | Performed by: FAMILY MEDICINE

## 2023-02-20 PROCEDURE — 3008F BODY MASS INDEX DOCD: CPT | Mod: CPTII,,, | Performed by: FAMILY MEDICINE

## 2023-02-20 PROCEDURE — 3079F PR MOST RECENT DIASTOLIC BLOOD PRESSURE 80-89 MM HG: ICD-10-PCS | Mod: CPTII,,, | Performed by: FAMILY MEDICINE

## 2023-02-20 PROCEDURE — 99214 PR OFFICE/OUTPT VISIT, EST, LEVL IV, 30-39 MIN: ICD-10-PCS | Mod: S$PBB,,, | Performed by: FAMILY MEDICINE

## 2023-02-20 PROCEDURE — 99999 PR PBB SHADOW E&M-EST. PATIENT-LVL IV: CPT | Mod: PBBFAC,,, | Performed by: FAMILY MEDICINE

## 2023-02-20 PROCEDURE — 99214 OFFICE O/P EST MOD 30 MIN: CPT | Mod: S$PBB,,, | Performed by: FAMILY MEDICINE

## 2023-02-20 RX ORDER — NEOMYCIN SULFATE, POLYMYXIN B SULFATE AND DEXAMETHASONE 3.5; 10000; 1 MG/ML; [USP'U]/ML; MG/ML
1 SUSPENSION/ DROPS OPHTHALMIC 4 TIMES DAILY
COMMUNITY
Start: 2023-02-08

## 2023-02-20 RX ORDER — NYSTATIN 100000 U/G
CREAM TOPICAL 2 TIMES DAILY
Qty: 30 G | Refills: 2 | Status: SHIPPED | OUTPATIENT
Start: 2023-02-20 | End: 2024-02-01 | Stop reason: SDUPTHER

## 2023-02-20 NOTE — PROGRESS NOTES
Subjective:       Patient ID: Jessi Linton is a 57 y.o. female.    Chief Complaint: Hospital Follow Up (Pt was seen in the ER 2/15/2023 for chest pains and SOB.)    Pt is a 57 y.o. female who presents for evaluation and management of   Encounter Diagnoses   Name Primary?    Nausea Yes    Concussion without loss of consciousness, subsequent encounter     Other chest pain     Intertriginous candidiasis    .recent ED visit for CP   Normal TROP, EKG CXR     Doing well on current meds. Denies any side effects. Prevention is up to date.  Review of Systems   Cardiovascular:  Negative for chest pain.   Gastrointestinal:  Positive for nausea and vomiting.   Neurological:  Negative for headaches.     Objective:      Physical Exam  Constitutional:       Appearance: She is well-developed. She is obese.   HENT:      Head: Normocephalic and atraumatic.      Right Ear: External ear normal.      Left Ear: External ear normal.      Nose: Nose normal.   Eyes:      Pupils: Pupils are equal, round, and reactive to light.      Comments: [place] eyes resolving    Neck:      Thyroid: No thyromegaly.      Vascular: No JVD.      Trachea: No tracheal deviation.   Cardiovascular:      Rate and Rhythm: Normal rate.      Heart sounds: Normal heart sounds. No murmur heard.  Pulmonary:      Effort: Pulmonary effort is normal. No respiratory distress.      Breath sounds: Normal breath sounds. No wheezing or rales.   Chest:      Chest wall: No tenderness.   Abdominal:      General: Bowel sounds are normal. There is no distension.      Palpations: Abdomen is soft. There is no mass.      Tenderness: There is no abdominal tenderness. There is no guarding or rebound.   Musculoskeletal:         General: Tenderness present.      Cervical back: Normal range of motion and neck supple.      Comments: Left UE in sling   Ecchymosis of left arm      Lymphadenopathy:      Cervical: No cervical adenopathy.   Skin:     General: Skin is warm and dry.       Coloration: Skin is not pale.      Findings: No erythema or rash.   Neurological:      Mental Status: She is alert and oriented to person, place, and time.      Cranial Nerves: No cranial nerve deficit.      Motor: No abnormal muscle tone.      Coordination: Coordination normal.      Deep Tendon Reflexes: Reflexes are normal and symmetric. Reflexes normal.   Psychiatric:         Behavior: Behavior normal.         Thought Content: Thought content normal.         Judgment: Judgment normal.       Assessment:       1. Nausea    2. Concussion without loss of consciousness, subsequent encounter    3. Other chest pain    4. Intertriginous candidiasis          Plan:   1. Nausea    2. Concussion without loss of consciousness, subsequent encounter    3. Other chest pain    4. Intertriginous candidiasis  -     nystatin (MYCOSTATIN) cream; Apply topically 2 (two) times daily.  Dispense: 30 g; Refill: 2    MI ruled out in ED. Her CP is atypical and reproducible with palpation.   Brain rest   She has compazine for nausea, hasn't taken it much     RTC if condition acutely worsens or any other concerns, otherwise RTC as scheduled      No follow-ups on file.

## 2023-02-22 ENCOUNTER — TELEPHONE (OUTPATIENT)
Dept: FAMILY MEDICINE | Facility: CLINIC | Age: 58
End: 2023-02-22
Payer: MEDICAID

## 2023-02-22 NOTE — TELEPHONE ENCOUNTER
----- Message from Ephraim Cowart sent at 2023  1:07 PM CST -----  Contact: Mom - Kassandra Linton  MRN: [id number]  : 1965  PCP: David Allred  Home Phone      [phone]  Work Phone      Not on file.  Mobile          [phone]      MESSAGE: seen by eye dr today ([facility] -Dr Toan Sierra) - problem with left eye,  states may be caused by concussion -- mom would like Dr Allred to review the visit -- his office states we need to request the note -- call [phone] or fax # [phone]    Call  Kassandra @ [phone]    PCP: Brigida

## 2023-02-22 NOTE — PROGRESS NOTES
Subjective:      Patient ID: Jessi Linton is a 57 y.o. female.    Chief Complaint: No chief complaint on file.    Review of patient's allergies indicates:   Allergen Reactions    Tramadol Rash      Pt returns to clinic for follow up of left shoulder pain/proximal humerus fx.  She reports some improvement of pain    2/1/23:  58 yo RHD F presents to clinic with parents with c/o left shoulder pain.  She tripped and fell at home a few days ago, landed on her left shooulder.  Was seen in ED after injury and diagnosed with proximal humerus fx.  Has been wearing sling since.  Mom noticed bruising to pt's upper arm.  She has tried taking pain medication prescribed by ED but makes her feel sick so has been taking tylenol arthritis with good relief of pain.  Occasional numbness/tingling to fingers when wearing sling.      Review of Systems   Constitutional: Negative for chills, diaphoresis and fever.   HENT:  Negative for congestion, ear discharge and ear pain.    Eyes:  Negative for blurred vision, discharge, double vision and pain.   Cardiovascular:  Negative for chest pain, claudication and cyanosis.   Respiratory:  Negative for cough, hemoptysis and shortness of breath.    Endocrine: Negative for cold intolerance and heat intolerance.   Skin:  Negative for color change, dry skin, itching and rash.   Musculoskeletal:  Positive for falls and joint pain. Negative for arthritis, back pain, gout, joint swelling, muscle weakness and neck pain.   Gastrointestinal:  Negative for abdominal pain and change in bowel habit.   Neurological:  Negative for brief paralysis, disturbances in coordination and dizziness.   Psychiatric/Behavioral:  Negative for altered mental status and depression.        Objective:          General    Constitutional: She is oriented to person, place, and time. She appears well-developed and well-nourished. No distress.   HENT:   Head: Atraumatic.   Eyes: EOM are normal. Right eye exhibits no discharge.  Left eye exhibits no discharge.   Cardiovascular:  Normal rate.            Pulmonary/Chest: Effort normal. No respiratory distress.   Abdominal: Soft.   Neurological: She is alert and oriented to person, place, and time.   Psychiatric: She has a normal mood and affect. Her behavior is normal.         Right Shoulder Exam     Inspection/Observation   Swelling: absent  Bruising: absent  Scars: absent  Deformity: absent    Other   Sensation: normal    Left Shoulder Exam     Inspection/Observation   Swelling: absent  Bruising: present (upper arm)  Scars: absent  Deformity: absent    Other   Sensation: normal     Comments:  Tenderness to anterior shoulder    Limited exam due to fx and decreased ROM      Vascular Exam     Right Pulses      Radial:                    2+      Left Pulses      Radial:                    2+      Capillary Refill  Right Hand: normal capillary refill  Left Hand: normal capillary refill                Assessment:         Xray Left Shoulder 1/28/23:  Comminuted displaced oblique fracture of the humeral head and neck with mild valgus angulation.      No diagnosis found.   There are no diagnoses linked to this encounter.             Plan:         Discussed diagnosis and treatment plan with pt and her parents. Xrays previously reviewed by Dr. Guerra who recommended treatment in sling and start PT in 3-4 weeks at last visit.    Sling as directed.  Referral to occupational therapy, start in 1 week  Continue tylenol as directed as needed.  Ice compresses as directed.  RTC in 2-3 weeks for follow up with repeat xrays, sooner if needed.    Patient voices understanding of and agreement with treatment plan. All of the patient's questions were answered and the patient will contact us if she has any questions or concerns in the interim.

## 2023-02-23 ENCOUNTER — HOSPITAL ENCOUNTER (OUTPATIENT)
Dept: RADIOLOGY | Facility: HOSPITAL | Age: 58
Discharge: HOME | End: 2023-02-23
Attending: PHYSICIAN ASSISTANT
Payer: MEDICAID

## 2023-02-23 ENCOUNTER — TELEPHONE (OUTPATIENT)
Dept: ORTHOPEDICS | Facility: CLINIC | Age: 58
End: 2023-02-23
Payer: MEDICAID

## 2023-02-23 ENCOUNTER — OFFICE VISIT (OUTPATIENT)
Dept: ORTHOPEDICS | Facility: CLINIC | Age: 58
End: 2023-02-23
Payer: MEDICAID

## 2023-02-23 VITALS
SYSTOLIC BLOOD PRESSURE: 122 MMHG | DIASTOLIC BLOOD PRESSURE: 72 MMHG | WEIGHT: 228.38 LBS | HEIGHT: 55 IN | BODY MASS INDEX: 52.85 KG/M2 | OXYGEN SATURATION: 96 % | HEART RATE: 65 BPM

## 2023-02-23 DIAGNOSIS — S42.292A CLOSED FRACTURE OF HEAD OF LEFT HUMERUS, INITIAL ENCOUNTER: Primary | ICD-10-CM

## 2023-02-23 DIAGNOSIS — S42.292A CLOSED FRACTURE OF HEAD OF LEFT HUMERUS, INITIAL ENCOUNTER: ICD-10-CM

## 2023-02-23 DIAGNOSIS — M25.512 LEFT SHOULDER PAIN, UNSPECIFIED CHRONICITY: Primary | ICD-10-CM

## 2023-02-23 PROCEDURE — 73030 X-RAY EXAM OF SHOULDER: CPT | Mod: 26,LT,, | Performed by: RADIOLOGY

## 2023-02-23 PROCEDURE — 3074F SYST BP LT 130 MM HG: CPT | Mod: CPTII,,, | Performed by: PHYSICIAN ASSISTANT

## 2023-02-23 PROCEDURE — 1159F PR MEDICATION LIST DOCUMENTED IN MEDICAL RECORD: ICD-10-PCS | Mod: CPTII,,, | Performed by: PHYSICIAN ASSISTANT

## 2023-02-23 PROCEDURE — 99213 PR OFFICE/OUTPT VISIT, EST, LEVL III, 20-29 MIN: ICD-10-PCS | Mod: S$PBB,,, | Performed by: PHYSICIAN ASSISTANT

## 2023-02-23 PROCEDURE — 1159F MED LIST DOCD IN RCRD: CPT | Mod: CPTII,,, | Performed by: PHYSICIAN ASSISTANT

## 2023-02-23 PROCEDURE — 1160F RVW MEDS BY RX/DR IN RCRD: CPT | Mod: CPTII,,, | Performed by: PHYSICIAN ASSISTANT

## 2023-02-23 PROCEDURE — 73030 X-RAY EXAM OF SHOULDER: CPT | Mod: TC,LT

## 2023-02-23 PROCEDURE — 99999 PR PBB SHADOW E&M-EST. PATIENT-LVL V: ICD-10-PCS | Mod: PBBFAC,,, | Performed by: PHYSICIAN ASSISTANT

## 2023-02-23 PROCEDURE — 3008F BODY MASS INDEX DOCD: CPT | Mod: CPTII,,, | Performed by: PHYSICIAN ASSISTANT

## 2023-02-23 PROCEDURE — 3074F PR MOST RECENT SYSTOLIC BLOOD PRESSURE < 130 MM HG: ICD-10-PCS | Mod: CPTII,,, | Performed by: PHYSICIAN ASSISTANT

## 2023-02-23 PROCEDURE — 99999 PR PBB SHADOW E&M-EST. PATIENT-LVL V: CPT | Mod: PBBFAC,,, | Performed by: PHYSICIAN ASSISTANT

## 2023-02-23 PROCEDURE — 3044F PR MOST RECENT HEMOGLOBIN A1C LEVEL <7.0%: ICD-10-PCS | Mod: CPTII,,, | Performed by: PHYSICIAN ASSISTANT

## 2023-02-23 PROCEDURE — 99213 OFFICE O/P EST LOW 20 MIN: CPT | Mod: S$PBB,,, | Performed by: PHYSICIAN ASSISTANT

## 2023-02-23 PROCEDURE — 3044F HG A1C LEVEL LT 7.0%: CPT | Mod: CPTII,,, | Performed by: PHYSICIAN ASSISTANT

## 2023-02-23 PROCEDURE — 3008F PR BODY MASS INDEX (BMI) DOCUMENTED: ICD-10-PCS | Mod: CPTII,,, | Performed by: PHYSICIAN ASSISTANT

## 2023-02-23 PROCEDURE — 3078F PR MOST RECENT DIASTOLIC BLOOD PRESSURE < 80 MM HG: ICD-10-PCS | Mod: CPTII,,, | Performed by: PHYSICIAN ASSISTANT

## 2023-02-23 PROCEDURE — 3078F DIAST BP <80 MM HG: CPT | Mod: CPTII,,, | Performed by: PHYSICIAN ASSISTANT

## 2023-02-23 PROCEDURE — 73030 XR SHOULDER COMPLETE 2 OR MORE VIEWS LEFT: ICD-10-PCS | Mod: 26,LT,, | Performed by: RADIOLOGY

## 2023-02-23 PROCEDURE — 99215 OFFICE O/P EST HI 40 MIN: CPT | Mod: PBBFAC | Performed by: PHYSICIAN ASSISTANT

## 2023-02-23 PROCEDURE — 1160F PR REVIEW ALL MEDS BY PRESCRIBER/CLIN PHARMACIST DOCUMENTED: ICD-10-PCS | Mod: CPTII,,, | Performed by: PHYSICIAN ASSISTANT

## 2023-03-01 ENCOUNTER — TELEPHONE (OUTPATIENT)
Dept: FAMILY MEDICINE | Facility: CLINIC | Age: 58
End: 2023-03-01
Payer: MEDICAID

## 2023-03-01 ENCOUNTER — CLINICAL SUPPORT (OUTPATIENT)
Dept: REHABILITATION | Facility: HOSPITAL | Age: 58
End: 2023-03-01
Payer: MEDICAID

## 2023-03-01 DIAGNOSIS — S42.292A CLOSED FRACTURE OF HEAD OF LEFT HUMERUS, INITIAL ENCOUNTER: ICD-10-CM

## 2023-03-01 DIAGNOSIS — R29.898 DECREASED GRIP STRENGTH OF LEFT HAND: ICD-10-CM

## 2023-03-01 DIAGNOSIS — M25.622 DECREASED RANGE OF MOTION OF ELBOW, LEFT: ICD-10-CM

## 2023-03-01 DIAGNOSIS — M25.612 DECREASED RANGE OF MOTION OF LEFT SHOULDER: ICD-10-CM

## 2023-03-01 DIAGNOSIS — M79.602 ARM PAIN, ANTERIOR, LEFT: ICD-10-CM

## 2023-03-01 PROCEDURE — 97165 OT EVAL LOW COMPLEX 30 MIN: CPT | Mod: PN

## 2023-03-01 NOTE — TELEPHONE ENCOUNTER
----- Message from Ephraim Cowart sent at 2023  8:25 AM CST -----  Contact: Patient  Jessi Linton  MRN: [id number]  : 1965  PCP: David Allred  Home Phone      [phone]  Work Phone      Not on file.  Mobile          [phone]      MESSAGE: was seen on 23 -- next appt in July -- do you want to see her before then --please advise    Call [phone] or Kassandra @ [phone]    PCP: Brigida

## 2023-03-01 NOTE — PROGRESS NOTES
[facility]  Occupational Therapy Initial Evaluation    Date: 3/1/2023  Name: Jessi Linton  Clinic Number: [id number]    Therapy Diagnosis:   Encounter Diagnoses   Name Primary?    Closed fracture of head of left humerus, initial encounter     Arm pain, anterior, left     Decreased range of motion of left shoulder     Decreased range of motion of elbow, left     Decreased  strength of left hand      Physician: Jaycee Adam PA-C    Physician Orders: OT evaluate and treat  Medical Diagnosis: Closed fracture of head of left humerus.  Surgical Procedure and Date: NA  Evaluation Date: 3/1/2023  Insurance Authorization Period Expiration: 2/23/2023 - 2/23/2024  Plan of Care Certification Period: 3/1/2023 - 4/28/2023  Progress Note Due: 3/31/2023   Date of Return to MD: 3/16/2023    Visit # / Visits authorized: Evaluation  FOTO: See media section.    Precautions:  Standard and Weightbearing    Time In:10:30  Time Out: 11:15  Total Appointment Time (timed & untimed codes): 45 minutes    SUBJECTIVE     Date of Onset: January 28    History of Current Condition/Mechanism of Injury: Jessi reports: Pt tripped in home over a crate of limes and fell onto left shoulder. Mother called 911 with EMS bringing her to [facility]. Pt suffered left humeral Fx with concussion. Pt presents with mother to help answer questions.    Falls: Yes    Involved Side: L  Dominant Side: Right  Imaging: X-ray (2/23/2023)    FINDINGS:  Again noted is a comminuted fracture involving the left humeral head and neck, minimally displaced.  No significant healing as compared to the previous study of 01/28/2023.       Prior Therapy: None   Occupation:  [employer]  Working presently: disability    Functional Limitations/Social History:    Previous functional status includes: Independent with all ADLs.     Current Functional Status   Home/Living environment: lives with their family      Limitation of Functional Status as  "follows:   ADLs/IADLs:     - Feeding: I    - Bathing: Total assist from mother.    - Dressing/Grooming: Total assist from mother, Max toilet hygiene. Independent to brush teeth, Total assist brush hair.    - Driving: Currently not driving due to concussion.    Pain:  Functional Pain Scale Rating 0-10:     Rest 0/10,   With movement 6/10,   worst 6/10     *Pt with difficulty articulating pain level, looking to mother to answer question.    Location: Anterior shoulder  Description: "It just hurts" Pt unable to appropriately describe pain.  Aggravating Factors: "When me or my mom moves my arm."  Easing Factors: pain medication and rest (Tylenol Arthritis)    Patient's Goals for Therapy: Reduce pain and be able to move arm again.    Medical History:   Past Medical History:   Diagnosis Date    Asthma     COPD with acute bronchitis 12/8/2017    Diabetes mellitus     Pre-diabetes (monitors sugars 3 x week)    Elevated blood pressure, situational 6/28/2019    GERD (gastroesophageal reflux disease)     Hiatal hernia     with reflux    Kidney stone     Migraine headache     Screen for colon cancer     Sleep apnea        Surgical History:    has a past surgical history that includes Hysterectomy and Oophorectomy.    Medications:   has a current medication list which includes the following prescription(s): albuterol, ascorbic acid (vitamin c), aspirin, azelastine, benzonatate, blood sugar diagnostic, blood-glucose meter, calcium carbonate, cinnamon bark, clotrimazole, flaxseed, fluticasone propionate, folic acid, furosemide, ibuprofen, lancets, magnesium, meloxicam, montelukast, multivitamin with minerals, naproxen, neomycin-polymyxin-dexamethasone, nystatin, ondansetron, ondansetron, onetouch delica lancets, onetouch ultra test, onetouch ultramini, oxybutynin, oxybutynin, premarin, prochlorperazine, propranolol, sodium fluoride-pot nitrate, triamcinolone acetonide 0.1%, and walker.    Allergies:   Review of patient's allergies " indicates:   Allergen Reactions    Tramadol Rash          OBJECTIVE     Scapular function and position: Forward posture with protracted shoulders. Slight resting shoulder elevation on left. Slow active scapular elevation with reduced scapular retraction due to pain on left.      Active Range of Motion:  (Measured in degrees)   Left   Shld Flexion 0   Shld Extension 5   Shld Abduction (Arm rests at 55 degrees abduction at Pt side) reached 60 actively   Shld Adduction 0   Shld Ext Rotation (Arm rests at ~30 degrees internal rotation against Pt body) external rotation to neutral.   Shld Int Rotation 30 degrees internal rotation at rest   Elbow Flexion (Pt elbow resting at 80 degrees flexion) reached 90 actively.   Elbow Extension 68       Manual Muscle Test within available range of motion    Left   Shld Flexion NT   Shld Extension NT   Shld Abduction NT   Shld Adduction NT   Shld Ext Rotation NT   Shld Int Rotation NT   Elbow Flexion 3/5   Elbow Extension 3/5   Elbow Supination -4/5   Elbow Pronation -4/5        Strength (Dynamometer/Measured in pounds on Rung II) and Pinch Strength (Pinch Gauge)   3/2/2023 3/2/2023    Right Left   Composite 30 8   Lateral Pinch 13 10   Tripod Pinch 13           5       Opposition (Fine Motor Skills/Dexterity): WNL     Wound/Scar management: Bruising noted at medial forearm, elbow, and upper arm at bicep belly.     Sensation: Pt reports no numbness or loss of sensation at this time.     Edema: None    Limitation/Restriction for FOTO Survey    Therapist reviewed FOTO scores for Jessi Linton on 3/1/2023.   FOTO documents entered into [employer] - see Media section.    Limitation Score: See media section         Patient Education and Home Exercises      Education provided:     Patient/Family Education: role of OT, goals for OT, scheduling/cancellations - pt verbalized understanding. Discussed insurance limitations with patient.      ASSESSMENT     Jessi Linton is a 57 y.o.  female referred to outpatient occupational therapy and presents with a medical diagnosis of Closed fracture of head of left humerus. Patient presents with the following therapy deficits: Decreased ROM, Decreased  strength, Decreased pinch strength, Decreased muscle strength, Decreased functional hand use, and Increased pain and demonstrates limitations as described in the chart below. Following medical record review it is determined that pt will benefit from occupational therapy services in order to maximize pain free and/or functional use of left shoulder and arm. The following goals were discussed with the patient and patient is in agreement with them as to be addressed in the treatment plan. The patient's rehab potential is Good.     Anticipated barriers to occupational therapy: None  Pt has no cultural, educational or language barriers to learning provided.    Profile and History Assessment of Occupational Performance Level of Clinical Decision Making Complexity Score   Occupational Profile:   Jessi Linton is a 57 y.o. female who lives with their family and is  on SSI  Jessi Linton has difficulty with  ADLs and IADLs as listed previously, which  Affecting herdaily functional abilities.      Comorbidities:    has a past medical history of Asthma, COPD with acute bronchitis, Diabetes mellitus, Elevated blood pressure, situational, GERD (gastroesophageal reflux disease), Hiatal hernia, Kidney stone, Migraine headache, Screen for colon cancer, and Sleep apnea.    Medical and Therapy History Review:   Brief               Performance Deficits    Physical:  Joint Mobility  Muscle Power/Strength   Strength  Pinch Strength  Gross Motor Coordination  Pain    Cognitive:  Pt presented with concussion, turning to mother to answer questions regarding Pt pain levels and function. Pt with good insight into safety awareness and limitations.    Psychosocial:    No Deficits     Clinical Decision  Making:  low    Assessment Process:  Problem-Focused Assessments    Modification/Need for Assistance:  Not Necessary    Intervention Selection:  Limited Treatment Options       low  Based on PMHX, co morbidities , data from assessments and functional level of assistance required with task and clinical presentation directly impacting function.       The following goals were discussed with the patient and patient is in agreement with them as to be addressed in the treatment plan.       Goals:   The following goals were discussed with the patient and patient is in agreement with them as to be addressed in the treatment plan.     Short Term Goals (STGs); to be met within 4 weeks.  STG #1: Pt will report 4 out of 10 pain level with with movement of left upper extremity.  STG #2: Pt will demonstrate increased ROM of ~80 degrees shoulder flexion and abduction in order increase functional use of UE  STG #3: Pt will increase elbow MMT to 3+/5 in elbow flexion/extension in order to improve functional use of UE.  STG #4: Pt will demonstrate independence with issued HEP.   STG #5: Pt will demonstrate increased  strength of 10 #s in order to increase functional use of UE.  STG #6 Pt to have shoulder MMT tested when appropriate.  STG #7: Pt to improve active range of motion external rotation to 20 degrees for improved functional use of left upper extremity.    Long Term Goals (LTGs); to be met by discharge.  LTG #1: Pt will report a pain level of 2 out of 10 with movement of left upper extremity.   LTG #2: Pt will demo improved FOTO score to predicted level.  LTG #3: Pt will return to prior level of function for all ADL tasks at home.   LTG #4: Pt will demonstrate increased ROM WFL in all elbow and shoulder movements in order increase functional use of UE  LTG #5: Pt will increase shoulder MMT within WFL in order to improve functional use of UE.  LTG #6: Pt will demonstrate increased  strength of 15#s in order to increase  functional use of UE.      PLAN   Plan of Care Certification: 3/1/2023 to 4/28/2023.     Outpatient Occupational Therapy 1 - 2 times weekly for 8 weeks to include the following interventions: Manual therapy/joint mobilizations, Modalities for pain management, US 3 mhz, Therapeutic exercises/activities., Strengthening, and Electrical Modalities.      Paco Ring OT      I CERTIFY THE NEED FOR THESE SERVICES FURNISHED UNDER THIS PLAN OF TREATMENT AND WHILE UNDER MY CARE  Physician's comments:      Physician's Signature: ___________________________________________________

## 2023-03-02 PROBLEM — M25.622 DECREASED RANGE OF MOTION OF ELBOW, LEFT: Status: ACTIVE | Noted: 2023-03-02

## 2023-03-02 PROBLEM — R29.898 DECREASED GRIP STRENGTH OF LEFT HAND: Status: ACTIVE | Noted: 2023-03-02

## 2023-03-02 PROBLEM — M79.602 ARM PAIN, ANTERIOR, LEFT: Status: ACTIVE | Noted: 2023-03-02

## 2023-03-02 PROBLEM — M25.612 DECREASED RANGE OF MOTION OF LEFT SHOULDER: Status: ACTIVE | Noted: 2023-03-02

## 2023-03-02 NOTE — PLAN OF CARE
[facility]  Occupational Therapy Initial Evaluation    Date: 3/1/2023  Name: Jessi Linton  Clinic Number: [id number]    Therapy Diagnosis:   Encounter Diagnoses   Name Primary?    Closed fracture of head of left humerus, initial encounter     Arm pain, anterior, left     Decreased range of motion of left shoulder     Decreased range of motion of elbow, left     Decreased  strength of left hand      Physician: Jaycee Adam PA-C    Physician Orders: OT evaluate and treat  Medical Diagnosis: Closed fracture of head of left humerus.  Surgical Procedure and Date: NA  Evaluation Date: 3/1/2023  Insurance Authorization Period Expiration: 2/23/2023 - 2/23/2024  Plan of Care Certification Period: 3/1/2023 - 4/28/2023  Progress Note Due: 3/31/2023   Date of Return to MD: 3/16/2023    Visit # / Visits authorized: Evaluation  FOTO: See media section.    Precautions:  Standard and Weightbearing    Time In:10:30  Time Out: 11:15  Total Appointment Time (timed & untimed codes): 45 minutes    SUBJECTIVE     Date of Onset: January 28    History of Current Condition/Mechanism of Injury: Jessi reports: Pt tripped in home over a crate of limes and fell onto left shoulder. Mother called 911 with EMS bringing her to [facility]. Pt suffered left humeral Fx with concussion. Pt presents with mother to help answer questions.    Falls: Yes    Involved Side: L  Dominant Side: Right  Imaging: X-ray (2/23/2023)    FINDINGS:  Again noted is a comminuted fracture involving the left humeral head and neck, minimally displaced.  No significant healing as compared to the previous study of 01/28/2023.       Prior Therapy: None   Occupation:  [employer]  Working presently: disability    Functional Limitations/Social History:    Previous functional status includes: Independent with all ADLs.     Current Functional Status   Home/Living environment: lives with their family      Limitation of Functional Status as  "follows:   ADLs/IADLs:     - Feeding: I    - Bathing: Total assist from mother.    - Dressing/Grooming: Total assist from mother, Max toilet hygiene. Independent to brush teeth, Total assist brush hair.    - Driving: Currently not driving due to concussion.    Pain:  Functional Pain Scale Rating 0-10:     Rest 0/10,   With movement 6/10,   worst 6/10     *Pt with difficulty articulating pain level, looking to mother to answer question.    Location: Anterior shoulder  Description: "It just hurts" Pt unable to appropriately describe pain.  Aggravating Factors: "When me or my mom moves my arm."  Easing Factors: pain medication and rest (Tylenol Arthritis)    Patient's Goals for Therapy: Reduce pain and be able to move arm again.    Medical History:   Past Medical History:   Diagnosis Date    Asthma     COPD with acute bronchitis 12/8/2017    Diabetes mellitus     Pre-diabetes (monitors sugars 3 x week)    Elevated blood pressure, situational 6/28/2019    GERD (gastroesophageal reflux disease)     Hiatal hernia     with reflux    Kidney stone     Migraine headache     Screen for colon cancer     Sleep apnea        Surgical History:    has a past surgical history that includes Hysterectomy and Oophorectomy.    Medications:   has a current medication list which includes the following prescription(s): albuterol, ascorbic acid (vitamin c), aspirin, azelastine, benzonatate, blood sugar diagnostic, blood-glucose meter, calcium carbonate, cinnamon bark, clotrimazole, flaxseed, fluticasone propionate, folic acid, furosemide, ibuprofen, lancets, magnesium, meloxicam, montelukast, multivitamin with minerals, naproxen, neomycin-polymyxin-dexamethasone, nystatin, ondansetron, ondansetron, onetouch delica lancets, onetouch ultra test, onetouch ultramini, oxybutynin, oxybutynin, premarin, prochlorperazine, propranolol, sodium fluoride-pot nitrate, triamcinolone acetonide 0.1%, and walker.    Allergies:   Review of patient's allergies " indicates:   Allergen Reactions    Tramadol Rash          OBJECTIVE     Scapular function and position: Forward posture with protracted shoulders. Slight resting shoulder elevation on left. Slow active scapular elevation with reduced scapular retraction due to pain on left.      Active Range of Motion:  (Measured in degrees)   Left   Shld Flexion 0   Shld Extension 5   Shld Abduction (Arm rests at 55 degrees abduction at Pt side) reached 60 actively   Shld Adduction 0   Shld Ext Rotation (Arm rests at ~30 degrees internal rotation against Pt body) external rotation to neutral.   Shld Int Rotation 30 degrees internal rotation at rest   Elbow Flexion (Pt elbow resting at 80 degrees flexion) reached 90 actively.   Elbow Extension 68       Manual Muscle Test within available range of motion    Left   Shld Flexion NT   Shld Extension NT   Shld Abduction NT   Shld Adduction NT   Shld Ext Rotation NT   Shld Int Rotation NT   Elbow Flexion 3/5   Elbow Extension 3/5   Elbow Supination -4/5   Elbow Pronation -4/5        Strength (Dynamometer/Measured in pounds on Rung II) and Pinch Strength (Pinch Gauge)   3/2/2023 3/2/2023    Right Left   Composite 30 8   Lateral Pinch 13 10   Tripod Pinch 13           5       Opposition (Fine Motor Skills/Dexterity): WNL     Wound/Scar management: Bruising noted at medial forearm, elbow, and upper arm at bicep belly.     Sensation: Pt reports no numbness or loss of sensation at this time.     Edema: None    Limitation/Restriction for FOTO Survey    Therapist reviewed FOTO scores for Jessi Linton on 3/1/2023.   FOTO documents entered into [employer] - see Media section.    Limitation Score: See media section         Patient Education and Home Exercises      Education provided:     Patient/Family Education: role of OT, goals for OT, scheduling/cancellations - pt verbalized understanding. Discussed insurance limitations with patient.      ASSESSMENT     Jessi Linton is a 57 y.o.  female referred to outpatient occupational therapy and presents with a medical diagnosis of Closed fracture of head of left humerus. Patient presents with the following therapy deficits: Decreased ROM, Decreased  strength, Decreased pinch strength, Decreased muscle strength, Decreased functional hand use, and Increased pain and demonstrates limitations as described in the chart below. Following medical record review it is determined that pt will benefit from occupational therapy services in order to maximize pain free and/or functional use of left shoulder and arm. The following goals were discussed with the patient and patient is in agreement with them as to be addressed in the treatment plan. The patient's rehab potential is Good.     Anticipated barriers to occupational therapy: None  Pt has no cultural, educational or language barriers to learning provided.    Profile and History Assessment of Occupational Performance Level of Clinical Decision Making Complexity Score   Occupational Profile:   Jessi Linton is a 57 y.o. female who lives with their family and is on SSI Jessi Linton has difficulty with  ADLs and IADLs as listed previously, which  Affecting herdaily functional abilities.      Comorbidities:    has a past medical history of Asthma, COPD with acute bronchitis, Diabetes mellitus, Elevated blood pressure, situational, GERD (gastroesophageal reflux disease), Hiatal hernia, Kidney stone, Migraine headache, Screen for colon cancer, and Sleep apnea.    Medical and Therapy History Review:   Brief               Performance Deficits    Physical:  Joint Mobility  Muscle Power/Strength   Strength  Pinch Strength  Gross Motor Coordination  Pain    Cognitive:  Pt presented with concussion, turning to mother to answer questions regarding Pt pain levels and function. Pt with good insight into safety awareness and limitations.    Psychosocial:    No Deficits     Clinical Decision  Making:  low    Assessment Process:  Problem-Focused Assessments    Modification/Need for Assistance:  Not Necessary    Intervention Selection:  Limited Treatment Options       low  Based on PMHX, co morbidities , data from assessments and functional level of assistance required with task and clinical presentation directly impacting function.       The following goals were discussed with the patient and patient is in agreement with them as to be addressed in the treatment plan.       Goals:   The following goals were discussed with the patient and patient is in agreement with them as to be addressed in the treatment plan.     Short Term Goals (STGs); to be met within 4 weeks.  STG #1: Pt will report 4 out of 10 pain level with with movement of left upper extremity.  STG #2: Pt will demonstrate increased ROM of ~80 degrees shoulder flexion and abduction in order increase functional use of UE  STG #3: Pt will increase elbow MMT to 3+/5 in elbow flexion/extension in order to improve functional use of UE.  STG #4: Pt will demonstrate independence with issued HEP.   STG #5: Pt will demonstrate increased  strength of 10 #s in order to increase functional use of UE.  STG #6 Pt to have shoulder MMT tested when appropriate.  STG #7: Pt to improve active range of motion external rotation to 20 degrees for improved functional use of left upper extremity.    Long Term Goals (LTGs); to be met by discharge.  LTG #1: Pt will report a pain level of 2 out of 10 with movement of left upper extremity.   LTG #2: Pt will demo improved FOTO score to predicted level.  LTG #3: Pt will return to prior level of function for all ADL tasks at home.   LTG #4: Pt will demonstrate increased ROM WFL in all elbow and shoulder movements in order increase functional use of UE  LTG #5: Pt will increase shoulder MMT within WFL in order to improve functional use of UE.  LTG #6: Pt will demonstrate increased  strength of 15#s in order to increase  functional use of UE.      PLAN   Plan of Care Certification: 3/1/2023 to 4/28/2023.     Outpatient Occupational Therapy 1 - 2 times weekly for 8 weeks to include the following interventions: Manual therapy/joint mobilizations, Modalities for pain management, US 3 mhz, Therapeutic exercises/activities., Strengthening, and Electrical Modalities.      Paco Ring OT      I CERTIFY THE NEED FOR THESE SERVICES FURNISHED UNDER THIS PLAN OF TREATMENT AND WHILE UNDER MY CARE  Physician's comments:      Physician's Signature: ___________________________________________________

## 2023-03-06 ENCOUNTER — CLINICAL SUPPORT (OUTPATIENT)
Dept: REHABILITATION | Facility: HOSPITAL | Age: 58
End: 2023-03-06
Attending: FAMILY MEDICINE
Payer: MEDICAID

## 2023-03-06 DIAGNOSIS — R29.898 DECREASED GRIP STRENGTH OF LEFT HAND: ICD-10-CM

## 2023-03-06 DIAGNOSIS — M25.612 DECREASED RANGE OF MOTION OF LEFT SHOULDER: ICD-10-CM

## 2023-03-06 DIAGNOSIS — M79.602 ARM PAIN, ANTERIOR, LEFT: Primary | ICD-10-CM

## 2023-03-06 DIAGNOSIS — M25.622 DECREASED RANGE OF MOTION OF ELBOW, LEFT: ICD-10-CM

## 2023-03-06 PROCEDURE — 97530 THERAPEUTIC ACTIVITIES: CPT | Mod: PN

## 2023-03-06 NOTE — PROGRESS NOTES
"  [facility]  Occupational Therapy Treatment Note    Date: 3/6/2023  Name: Jessi Linton  Clinic Number: [id number]    Therapy Diagnosis:   Encounter Diagnoses   Name Primary?    Arm pain, anterior, left Yes    Decreased range of motion of left shoulder     Decreased range of motion of elbow, left     Decreased  strength of left hand      Physician: No ref. provider found      Physician Orders: OT evaluate and treat  Medical Diagnosis: Closed fracture of head of left humerus.  Surgical Procedure and Date: NA  Evaluation Date: 3/1/2023  Insurance Authorization Period Expiration: n/a  Plan of Care Certification Period: 3/1/2023 - 4/28/2023  Progress Note Due: 3/31/2023   Date of Return to MD: 3/16/2023      Precautions:  Fall and Weightbearing    Time In: 11:45 am  Time Out: 12:30 pm  Total Billable Time: 45 minutes    SUBJECTIVE     Pt reports: "very guarded with shoulder and apprehensive for taking out of sling today"  She was compliant with home exercise program given last session.     Response to previous treatment:" I try to take my arm out and move my elbow" per pt      Pain:    0/10 prior to session    /10 end of session  Location: left shoulder      OBJECTIVE    Measurements updated at progress report unless specified.    Treatment     Jessi received the treatments listed below:     Supervised modalities after being cleared for contradictions for 13 minutes to include:   - cold pack x 8 min prior to session to reduce edema and inflammation prior to exercises and joint mobility and stretch.   - cold pack x 5 min post session to reduce increased inflammation       Therapeutic exercises to develop  and pinch strength, endurance, ROM, flexibility, and scapular stabilization for 32 minutes, including:   - active assistive range of motion scapular  Thoracic joint mobility activities all planes 3x10   - green digiflex composite 3x10   - pendulums exercises for glenohumeral joint " "mobility with therapist guided   - bicep stretches with elbow out of sling for mobility x10   - educated on HEP with verbal and written instructions. PT caregiver was also educated        Patient Education and Home Exercises      Education provided:   - written HEP of ST and GH mobility  - Progress towards goals     Written Home Exercises Provided: yes.  Exercises were reviewed and Jessi was able to demonstrate them prior to the end of the session.  Jessi demonstrated fair  understanding of the HEP provided. See EMR under Patient Instructions for exercises provided during therapy sessions.       Assessment     Jessi tolerated session fairly.  She continues to display very guarded stated with her shoulder and understating the importance of joint movement with elbow out of sling.  She was very apprehensive about today session and was resistant to using a supine mat for her session due to her having a "lung condition".  Scapular weakness and elbow tightness noted with session.  Also, she displays  strength weakness.  Moderate amount of bruising and edema noted on her (L) deltoid/bicep region.  No increased pain reported with session. Pt would continue to benefit from skilled OT to address goals and deficits stated below     Jessi is progressing well towards her goals and there are no updates to goals at this time. Pt prognosis is Good.     Pt will continue to benefit from skilled outpatient occupational therapy to address the deficits listed in the problem list on initial evaluation provide pt/family education and to maximize pt's level of independence in the home and community environment.     Pt's spiritual, cultural and educational needs considered and pt agreeable to plan of care and goals.    Anticipated barriers to occupational therapy: guarding of (L) UE      Goals:   The following goals were discussed with the patient and patient is in agreement with them as to be addressed in the treatment plan.      Short " Term Goals (STGs); to be met within 4 weeks.  STG #1: Pt will report 4 out of 10 pain level with with movement of left upper extremity.  STG #2: Pt will demonstrate increased ROM of ~80 degrees shoulder flexion and abduction in order increase functional use of UE  STG #3: Pt will increase elbow MMT to 3+/5 in elbow flexion/extension in order to improve functional use of UE.  STG #4: Pt will demonstrate independence with issued HEP.   STG #5: Pt will demonstrate increased  strength of 10 #s in order to increase functional use of UE.  STG #6 Pt to have shoulder MMT tested when appropriate.  STG #7: Pt to improve active range of motion external rotation to 20 degrees for improved functional use of left upper extremity.     Long Term Goals (LTGs); to be met by discharge.  LTG #1: Pt will report a pain level of 2 out of 10 with movement of left upper extremity.       LTG #2: Pt will demo improved FOTO score to predicted level.  LTG #3: Pt will return to prior level of function for all ADL tasks at home.   LTG #4: Pt will demonstrate increased ROM WFL in all elbow and shoulder movements in order increase functional use of UE  LTG #5: Pt will increase shoulder MMT within WFL in order to improve functional use of UE.  LTG #6: Pt will demonstrate increased  strength of 15#s in order to increase functional use of UE.        PLAN     Continue OT 2 times a week x's 8 weeks      Magda Rojas OT

## 2023-03-09 ENCOUNTER — CLINICAL SUPPORT (OUTPATIENT)
Dept: REHABILITATION | Facility: HOSPITAL | Age: 58
End: 2023-03-09
Attending: FAMILY MEDICINE
Payer: MEDICAID

## 2023-03-09 DIAGNOSIS — M25.622 DECREASED RANGE OF MOTION OF ELBOW, LEFT: ICD-10-CM

## 2023-03-09 DIAGNOSIS — M79.602 ARM PAIN, ANTERIOR, LEFT: Primary | ICD-10-CM

## 2023-03-09 DIAGNOSIS — M25.612 DECREASED RANGE OF MOTION OF LEFT SHOULDER: ICD-10-CM

## 2023-03-09 DIAGNOSIS — R29.898 DECREASED GRIP STRENGTH OF LEFT HAND: ICD-10-CM

## 2023-03-09 PROCEDURE — 97530 THERAPEUTIC ACTIVITIES: CPT | Mod: PN

## 2023-03-09 NOTE — PROGRESS NOTES
"  [facility]  Occupational Therapy Treatment Note    Date: 3/9/2023  Name: Jessi Linton  Clinic Number: [id number]    Therapy Diagnosis:   Encounter Diagnoses   Name Primary?    Arm pain, anterior, left Yes    Decreased range of motion of left shoulder     Decreased range of motion of elbow, left     Decreased  strength of left hand      Physician: Jaycee Adam PA-C      Physician Orders: OT evaluate and treat  Medical Diagnosis: Closed fracture of head of left humerus.  Surgical Procedure and Date: NA  Evaluation Date: 3/1/2023  Insurance Authorization Period Expiration: n/a  Plan of Care Certification Period: 3/1/2023 - 4/28/2023  Progress Note Due: 3/31/2023   Date of Return to MD: 3/16/2023      Precautions:  Fall and Weightbearing    Time In: 11:45 am  Time Out: 12:30 pm  Total Billable Time: 45 minutes    SUBJECTIVE     Pt reports: "very guarded with shoulder and apprehensive for taking out of sling today"  She was compliant with home exercise program given last session.     Response to previous treatment:" I try to take my arm out and move my elbow" per pt      Pain:    0/10 prior to session    /10 end of session  Location: left shoulder      OBJECTIVE    Measurements updated at progress report unless specified.    Treatment     Jessi received the treatments listed below:     Supervised modalities after being cleared for contradictions for 13 minutes to include:   - cold pack x 8 min prior to session to reduce edema and inflammation prior to exercises and joint mobility and stretch.   - cold pack x 5 min post session to reduce increased inflammation       Therapeutic exercises to develop  and pinch strength, endurance, ROM, flexibility, and scapular stabilization for 32 minutes, including:   - active assistive range of motion scapular  Thoracic joint mobility activities all planes 3x10   - green digiflex composite 3x10   - pendulums exercises for glenohumeral joint " mobility with therapist guided   - bicep stretches with elbow out of sling for mobility x10   - educated on HEP with verbal and written instructions. PT caregiver was also educated        Patient Education and Home Exercises      Education provided:   - written HEP of ST and GH mobility  - Progress towards goals     Written Home Exercises Provided: yes.  Exercises were reviewed and Jessi was able to demonstrate them prior to the end of the session.  Jessi demonstrated fair  understanding of the HEP provided. See EMR under Patient Instructions for exercises provided during therapy sessions.       Assessment     Jessi was noted with increased tolerance to today's session however she continues to display guarded behavior and apprehension about joint mobility.  OT reviewed the importance of joint movement with elbow out of sling.  She was noted with increased Scapular mobility, elbow extension, and improved  strength today.  Jessi continues with edema and bruising of the (L) deltiod/bicep region however improving .  Also, she displays decreases shoulder joint mobility, strength, and stability.  No increased pain reported with session. Pt would continue to benefit from skilled OT to address goals and deficits stated below     Jessi is progressing well towards her goals and there are no updates to goals at this time. Pt prognosis is Good.     Pt will continue to benefit from skilled outpatient occupational therapy to address the deficits listed in the problem list on initial evaluation provide pt/family education and to maximize pt's level of independence in the home and community environment.     Pt's spiritual, cultural and educational needs considered and pt agreeable to plan of care and goals.    Anticipated barriers to occupational therapy: guarding of (L) UE      Goals:   The following goals were discussed with the patient and patient is in agreement with them as to be addressed in the treatment plan.      Short  Term Goals (STGs); to be met within 4 weeks.  STG #1: Pt will report 4 out of 10 pain level with with movement of left upper extremity.  STG #2: Pt will demonstrate increased ROM of ~80 degrees shoulder flexion and abduction in order increase functional use of UE  STG #3: Pt will increase elbow MMT to 3+/5 in elbow flexion/extension in order to improve functional use of UE.  STG #4: Pt will demonstrate independence with issued HEP.   STG #5: Pt will demonstrate increased  strength of 10 #s in order to increase functional use of UE.  STG #6 Pt to have shoulder MMT tested when appropriate.  STG #7: Pt to improve active range of motion external rotation to 20 degrees for improved functional use of left upper extremity.     Long Term Goals (LTGs); to be met by discharge.  LTG #1: Pt will report a pain level of 2 out of 10 with movement of left upper extremity.       LTG #2: Pt will demo improved FOTO score to predicted level.  LTG #3: Pt will return to prior level of function for all ADL tasks at home.   LTG #4: Pt will demonstrate increased ROM WFL in all elbow and shoulder movements in order increase functional use of UE  LTG #5: Pt will increase shoulder MMT within WFL in order to improve functional use of UE.  LTG #6: Pt will demonstrate increased  strength of 15#s in order to increase functional use of UE.        PLAN     Continue OT 2 times a week x's 8 weeks      Magda Rojas OT

## 2023-03-12 ENCOUNTER — HOSPITAL ENCOUNTER (EMERGENCY)
Facility: HOSPITAL | Age: 58
Discharge: HOME | End: 2023-03-12
Attending: EMERGENCY MEDICINE
Payer: MEDICAID

## 2023-03-12 VITALS
HEART RATE: 75 BPM | OXYGEN SATURATION: 98 % | RESPIRATION RATE: 20 BRPM | DIASTOLIC BLOOD PRESSURE: 76 MMHG | TEMPERATURE: 99 F | SYSTOLIC BLOOD PRESSURE: 156 MMHG | BODY MASS INDEX: 52.85 KG/M2 | WEIGHT: 228.38 LBS | HEIGHT: 55 IN

## 2023-03-12 DIAGNOSIS — R07.9 CHEST PAIN, UNSPECIFIED TYPE: Primary | ICD-10-CM

## 2023-03-12 DIAGNOSIS — R07.89 ATYPICAL CHEST PAIN: ICD-10-CM

## 2023-03-12 DIAGNOSIS — R07.9 CHEST PAIN: ICD-10-CM

## 2023-03-12 LAB
ALBUMIN SERPL BCP-MCNC: 3.1 G/DL (ref 3.5–5.2)
ALP SERPL-CCNC: 83 U/L (ref 55–135)
ALT SERPL W/O P-5'-P-CCNC: 28 U/L (ref 10–44)
ANION GAP SERPL CALC-SCNC: 8 MMOL/L (ref 8–16)
AST SERPL-CCNC: 16 U/L (ref 10–40)
BASOPHILS # BLD AUTO: 0.04 K/UL (ref 0–0.2)
BASOPHILS NFR BLD: 0.5 % (ref 0–1.9)
BILIRUB SERPL-MCNC: 0.3 MG/DL (ref 0.1–1)
BNP SERPL-MCNC: 70 PG/ML (ref 0–99)
BUN SERPL-MCNC: 19 MG/DL (ref 6–20)
CALCIUM SERPL-MCNC: 9.6 MG/DL (ref 8.7–10.5)
CHLORIDE SERPL-SCNC: 102 MMOL/L (ref 95–110)
CO2 SERPL-SCNC: 28 MMOL/L (ref 23–29)
CREAT SERPL-MCNC: 0.8 MG/DL (ref 0.5–1.4)
DIFFERENTIAL METHOD: ABNORMAL
EOSINOPHIL # BLD AUTO: 0.1 K/UL (ref 0–0.5)
EOSINOPHIL NFR BLD: 1.7 % (ref 0–8)
ERYTHROCYTE [DISTWIDTH] IN BLOOD BY AUTOMATED COUNT: 13.2 % (ref 11.5–14.5)
EST. GFR  (NO RACE VARIABLE): >60 ML/MIN/1.73 M^2
GLUCOSE SERPL-MCNC: 152 MG/DL (ref 70–110)
HCT VFR BLD AUTO: 38.8 % (ref 37–48.5)
HGB BLD-MCNC: 12.3 G/DL (ref 12–16)
IMM GRANULOCYTES # BLD AUTO: 0.02 K/UL (ref 0–0.04)
IMM GRANULOCYTES NFR BLD AUTO: 0.3 % (ref 0–0.5)
LYMPHOCYTES # BLD AUTO: 1.3 K/UL (ref 1–4.8)
LYMPHOCYTES NFR BLD: 16 % (ref 18–48)
MCH RBC QN AUTO: 32.8 PG (ref 27–31)
MCHC RBC AUTO-ENTMCNC: 31.7 G/DL (ref 32–36)
MCV RBC AUTO: 104 FL (ref 82–98)
MONOCYTES # BLD AUTO: 0.7 K/UL (ref 0.3–1)
MONOCYTES NFR BLD: 9 % (ref 4–15)
NEUTROPHILS # BLD AUTO: 5.7 K/UL (ref 1.8–7.7)
NEUTROPHILS NFR BLD: 72.5 % (ref 38–73)
NRBC BLD-RTO: 0 /100 WBC
PLATELET # BLD AUTO: 234 K/UL (ref 150–450)
PMV BLD AUTO: 9.7 FL (ref 9.2–12.9)
POTASSIUM SERPL-SCNC: 4.2 MMOL/L (ref 3.5–5.1)
PROT SERPL-MCNC: 6.6 G/DL (ref 6–8.4)
RBC # BLD AUTO: 3.75 M/UL (ref 4–5.4)
SODIUM SERPL-SCNC: 138 MMOL/L (ref 136–145)
TROPONIN I SERPL DL<=0.01 NG/ML-MCNC: <0.006 NG/ML (ref 0–0.03)
TROPONIN I SERPL DL<=0.01 NG/ML-MCNC: <0.006 NG/ML (ref 0–0.03)
WBC # BLD AUTO: 7.82 K/UL (ref 3.9–12.7)

## 2023-03-12 PROCEDURE — 25000003 PHARM REV CODE 250: Performed by: EMERGENCY MEDICINE

## 2023-03-12 PROCEDURE — 85025 COMPLETE CBC W/AUTO DIFF WBC: CPT | Performed by: EMERGENCY MEDICINE

## 2023-03-12 PROCEDURE — 84484 ASSAY OF TROPONIN QUANT: CPT | Mod: 91 | Performed by: EMERGENCY MEDICINE

## 2023-03-12 PROCEDURE — 93010 ELECTROCARDIOGRAM REPORT: CPT | Mod: ,,, | Performed by: INTERNAL MEDICINE

## 2023-03-12 PROCEDURE — 99285 EMERGENCY DEPT VISIT HI MDM: CPT | Mod: 25

## 2023-03-12 PROCEDURE — 94760 N-INVAS EAR/PLS OXIMETRY 1: CPT

## 2023-03-12 PROCEDURE — 36415 COLL VENOUS BLD VENIPUNCTURE: CPT | Performed by: EMERGENCY MEDICINE

## 2023-03-12 PROCEDURE — 93010 EKG 12-LEAD: ICD-10-PCS | Mod: ,,, | Performed by: INTERNAL MEDICINE

## 2023-03-12 PROCEDURE — 83880 ASSAY OF NATRIURETIC PEPTIDE: CPT | Performed by: EMERGENCY MEDICINE

## 2023-03-12 PROCEDURE — 80053 COMPREHEN METABOLIC PANEL: CPT | Performed by: EMERGENCY MEDICINE

## 2023-03-12 PROCEDURE — 93005 ELECTROCARDIOGRAM TRACING: CPT

## 2023-03-12 RX ORDER — MORPHINE SULFATE 2 MG/ML
2 INJECTION, SOLUTION INTRAMUSCULAR; INTRAVENOUS ONCE
Status: DISCONTINUED | OUTPATIENT
Start: 2023-03-12 | End: 2023-03-12 | Stop reason: HOSPADM

## 2023-03-12 RX ORDER — ONDANSETRON 4 MG/1
4 TABLET, ORALLY DISINTEGRATING ORAL ONCE
Status: DISCONTINUED | OUTPATIENT
Start: 2023-03-12 | End: 2023-03-12 | Stop reason: HOSPADM

## 2023-03-12 RX ORDER — ACETAMINOPHEN 325 MG/1
650 TABLET ORAL
Status: COMPLETED | OUTPATIENT
Start: 2023-03-12 | End: 2023-03-12

## 2023-03-12 RX ADMIN — ACETAMINOPHEN 650 MG: 325 TABLET ORAL at 05:03

## 2023-03-12 NOTE — ED PROVIDER NOTES
Encounter Date: 3/12/2023       History     Chief Complaint   Patient presents with    Chest Pain     Non-radiating, midsternal Chest pain onset 20 mins PTA that woke her from her sleep.  Denies SOB, n/v.      58 YO FEMALE WHO COMES IN TODAY DUE TO CHEST PAIN.  SHE STATES THAT THE CHEST PAIN AWAKENED HER AROUND 0110.  SHE DESCRIBES THE CHEST PAIN AS MIDSTERNAL, 10/10, INTENSE, WITH NO RADIATION, NAUSEA, VOMITING, DIAPHORESIS, SHORTNESS OF BREATH, OR OTHER COMPLAINTS.  MOM STATES THAT THE PATIENT IS DOING REHAB FROM FRACTURING HER LEFT UPPER EXTREMITY IN January.        Review of patient's allergies indicates:   Allergen Reactions    Tramadol Rash     Past Medical History:   Diagnosis Date    Asthma     COPD with acute bronchitis 12/8/2017    Diabetes mellitus     Pre-diabetes (monitors sugars 3 x week)    Elevated blood pressure, situational 6/28/2019    GERD (gastroesophageal reflux disease)     Hiatal hernia     with reflux    Kidney stone     Migraine headache     Screen for colon cancer     Sleep apnea      Past Surgical History:   Procedure Laterality Date    HYSTERECTOMY      MILA- BSO    OOPHORECTOMY       Family History   Problem Relation Age of Onset    Glaucoma Mother     Cancer Father     Heart disease Maternal Grandmother     Heart disease Maternal Grandfather     Diabetes Maternal Grandfather      Social History     Tobacco Use    Smoking status: Never    Smokeless tobacco: Never   Substance Use Topics    Alcohol use: No    Drug use: No     Review of Systems   Constitutional: Negative.    HENT: Negative.     Eyes: Negative.    Respiratory: Negative.     Cardiovascular:  Positive for chest pain.   Gastrointestinal: Negative.    Genitourinary: Negative.    Musculoskeletal:  Positive for arthralgias.   Skin: Negative.    Neurological: Negative.    Hematological: Negative.    Psychiatric/Behavioral: Negative.       Physical Exam     Initial Vitals [03/12/23 0126]   BP Pulse Resp Temp SpO2   139/73 73 18 98.6  °F (37 °C) 95 %      MAP       --         Physical Exam    Nursing note and vitals reviewed.  Constitutional: She appears well-developed and well-nourished.   HENT:   Head: Normocephalic and atraumatic.   Eyes: EOM are normal. Pupils are equal, round, and reactive to light.   Neck: Neck supple.   Normal range of motion.  Cardiovascular:  Normal rate, regular rhythm and normal heart sounds.           Pulmonary/Chest: Breath sounds normal.   Abdominal: Abdomen is soft.   Musculoskeletal:         General: Tenderness present.      Cervical back: Normal range of motion and neck supple.     Neurological: She is alert.   Skin: Skin is warm.   Psychiatric: She has a normal mood and affect.       ED Course   Procedures  Labs Reviewed   CBC W/ AUTO DIFFERENTIAL - Abnormal; Notable for the following components:       Result Value    RBC 3.75 (*)      (*)     MCH 32.8 (*)     MCHC 31.7 (*)     Lymph % 16.0 (*)     All other components within normal limits   COMPREHENSIVE METABOLIC PANEL - Abnormal; Notable for the following components:    Glucose 152 (*)     Albumin 3.1 (*)     All other components within normal limits   TROPONIN I   TROPONIN I   B-TYPE NATRIURETIC PEPTIDE     EKG Readings: (Independently Interpreted)   EKG REVEALED A RATE OF 72 WITH NO ST OR T WAVE CHANGES     Imaging Results              X-Ray Chest AP Portable (In process)                   X-Rays:   Independently Interpreted Readings:   Other Readings:  CHEST RADIOGRAPH IS PENDING   Medications   morphine injection 2 mg (2 mg Intramuscular Not Given 3/12/23 0415)   ondansetron disintegrating tablet 4 mg (4 mg Oral Not Given 3/12/23 0415)     Medical Decision Making:   Differential Diagnosis:   CHEST PAIN, ANXIETY, MUSCULOSKELETAL CHEST PAIN, GERD  56 YO FEMALE WHO COMES IN TODAY DUE TO CHEST PAIN.  SHE STATES THAT IT AWAKENED HER FROM SLEEP.  HER EVALUATION   IS PENDING.       ED EVALUATION IS NEGATIVE FOR ANY ACUTE PATHOLOGY.  I DID OFFER THE  PATIENT MEDICATION FOR PAIN CONTROL.  SHE  DECLINED.  HOME TODAY WITH FOLLOW UP WITH HER PHYSICIAN.                         Clinical Impression:   Final diagnoses:  [R07.9] Chest pain  [R07.9] Chest pain, unspecified type (Primary)  [R07.89] Atypical chest pain        ED Disposition Condition    Discharge Stable          ED Prescriptions    None       Follow-up Information       Follow up With Specialties Details Why Contact Info    David Allred MD Family Medicine In 1 week TO RECHECK TODAY'S COMPLAINT [address]  [facility] [address]  [phone]               Prema Gonzales MD  03/12/23 0450

## 2023-03-13 ENCOUNTER — CLINICAL SUPPORT (OUTPATIENT)
Dept: REHABILITATION | Facility: HOSPITAL | Age: 58
End: 2023-03-13
Attending: PHYSICIAN ASSISTANT
Payer: MEDICAID

## 2023-03-13 DIAGNOSIS — M79.602 ARM PAIN, ANTERIOR, LEFT: Primary | ICD-10-CM

## 2023-03-13 DIAGNOSIS — R29.898 DECREASED GRIP STRENGTH OF LEFT HAND: ICD-10-CM

## 2023-03-13 DIAGNOSIS — M25.622 DECREASED RANGE OF MOTION OF ELBOW, LEFT: ICD-10-CM

## 2023-03-13 DIAGNOSIS — M25.612 DECREASED RANGE OF MOTION OF LEFT SHOULDER: ICD-10-CM

## 2023-03-13 PROCEDURE — 97530 THERAPEUTIC ACTIVITIES: CPT | Mod: PN

## 2023-03-13 NOTE — PROGRESS NOTES
"  [facility]  Occupational Therapy Treatment Note    Date: 3/13/2023  Name: Jessi Linton  Clinic Number: [id number]    Therapy Diagnosis:   Encounter Diagnoses   Name Primary?    Arm pain, anterior, left Yes    Decreased range of motion of left shoulder     Decreased range of motion of elbow, left     Decreased  strength of left hand      Physician: Jaycee Adam PA-C      Physician Orders: OT evaluate and treat  Medical Diagnosis: Closed fracture of head of left humerus.  Surgical Procedure and Date: NA  Evaluation Date: 3/1/2023  Insurance Authorization Period Expiration: 3/9/2023 - 12/31/2023  Plan of Care Certification Period: 3/1/2023 - 4/28/2023  Progress Note Due: 3/31/2023   Date of Return to MD: 3/16/2023    # Visits Authorized           3  / 20    Precautions:  Fall and Weightbearing    Time In: 10:15 am  Time Out: 11:00 am  Total Billable Time: 45 minutes    SUBJECTIVE     Pt reports: "I went to ER this Saturday, I thought it was a heart attack"  She was compliant with home exercise program given last session.     Response to previous treatment:"good"      Pain:    2-3/10 prior to session    /10 end of session    Location: left shoulder      OBJECTIVE    Measurements updated at progress report unless specified.    Treatment     Jessi received the treatments listed below:     Supervised modalities after being cleared for contradictions for 13 minutes to include:   - Moist heat pack x 8 min prior to session to reduce edema and inflammation prior to exercises and joint mobility and stretch.   - cold pack x 5 min post session to reduce increased inflammation       Therapeutic exercises to develop  and pinch strength, endurance, ROM, flexibility, and scapular stabilization for 32 minutes, including:   - active assistive range of motion scapular  Thoracic joint mobility activities all planes 3x10   - green digiflex composite 3x10   - ball squeezes 3x10   - green digiclip " of lat and tripod pinch 3x10   - scapular mobility of elevation and retractions active range of motion 3x10    - pendulums exercises for glenohumeral joint mobility with therapist guided   - bicep stretches with elbow out of sling for mobility x10   - yellow theraband bicep curls 3x10   - yellow theraband tricep strength 3x10   - Table slides of passive flexion 3x5   - Table slides of passive adduction/abduction 3x5     - educated on HEP with verbal instructions to continue with added theraband and table slides. PT caregiver was also educated        Patient Education and Home Exercises      Education provided:   - written HEP of ST and GH mobility  - Progress towards goals     Written Home Exercises Provided: yes.  Exercises were reviewed and Jessi was able to demonstrate them prior to the end of the session.  Jessi demonstrated fair  understanding of the HEP provided. See EMR under Patient Instructions for exercises provided during therapy sessions.       Assessment     Jessi was noted with increased tolerance to today's session with no guarded behavior and apprehension noted. She displayed good increased scapular and elbow joint mobility and was jamison to tolerated strength and stabilization activities. Increased  strength noted as well today.  Some cueing for scapular exercises but increased tolerance noted. She was noted with increased Scapular mobility, elbow extension, and improved  strength today.  Jessi continues with edema and bruising of the (L) deltiod/bicep region however improving .  Also, she displays decreases shoulder joint mobility, strength, and stability.  No increased pain reported with session. Pt would continue to benefit from skilled OT to address goals and deficits stated below     Jessi is progressing well towards her goals and there are no updates to goals at this time. Pt prognosis is Good.     Pt will continue to benefit from skilled outpatient occupational therapy to address the  deficits listed in the problem list on initial evaluation provide pt/family education and to maximize pt's level of independence in the home and community environment.     Pt's spiritual, cultural and educational needs considered and pt agreeable to plan of care and goals.    Anticipated barriers to occupational therapy: guarding of (L) UE      Goals:   The following goals were discussed with the patient and patient is in agreement with them as to be addressed in the treatment plan.      Short Term Goals (STGs); to be met within 4 weeks.  STG #1: Pt will report 4 out of 10 pain level with with movement of left upper extremity.  STG #2: Pt will demonstrate increased ROM of ~80 degrees shoulder flexion and abduction in order increase functional use of UE  STG #3: Pt will increase elbow MMT to 3+/5 in elbow flexion/extension in order to improve functional use of UE.  STG #4: Pt will demonstrate independence with issued HEP.   STG #5: Pt will demonstrate increased  strength of 10 #s in order to increase functional use of UE.  STG #6 Pt to have shoulder MMT tested when appropriate.  STG #7: Pt to improve active range of motion external rotation to 20 degrees for improved functional use of left upper extremity.     Long Term Goals (LTGs); to be met by discharge.  LTG #1: Pt will report a pain level of 2 out of 10 with movement of left upper extremity.       LTG #2: Pt will demo improved FOTO score to predicted level.  LTG #3: Pt will return to prior level of function for all ADL tasks at home.   LTG #4: Pt will demonstrate increased ROM WFL in all elbow and shoulder movements in order increase functional use of UE  LTG #5: Pt will increase shoulder MMT within WFL in order to improve functional use of UE.  LTG #6: Pt will demonstrate increased  strength of 15#s in order to increase functional use of UE.        PLAN     Continue OT 2 times a week x's 8 weeks      Magda Rojas OT

## 2023-03-14 ENCOUNTER — TELEPHONE (OUTPATIENT)
Dept: FAMILY MEDICINE | Facility: CLINIC | Age: 58
End: 2023-03-14
Payer: MEDICAID

## 2023-03-14 NOTE — TELEPHONE ENCOUNTER
----- Message from Ephraim Cowart sent at 3/13/2023 12:30 PM CDT -----  Contact: patient  Jessi Linton  MRN: [id number]  : 1965  PCP: David Allred  Home Phone      [phone]  Work Phone      Not on file.  Mobile          [phone]      MESSAGE: needs ER f/u - non cardiac chest pain (trauma) -- requesting appt with Dr Allred in 1 wk    Call Kassandra @ [phone]    PCP: Brigida

## 2023-03-16 ENCOUNTER — HOSPITAL ENCOUNTER (OUTPATIENT)
Dept: RADIOLOGY | Facility: HOSPITAL | Age: 58
Discharge: HOME | End: 2023-03-16
Attending: PHYSICIAN ASSISTANT
Payer: MEDICAID

## 2023-03-16 ENCOUNTER — OFFICE VISIT (OUTPATIENT)
Dept: ORTHOPEDICS | Facility: CLINIC | Age: 58
End: 2023-03-16
Payer: MEDICAID

## 2023-03-16 ENCOUNTER — TELEPHONE (OUTPATIENT)
Dept: ORTHOPEDICS | Facility: CLINIC | Age: 58
End: 2023-03-16
Payer: MEDICAID

## 2023-03-16 VITALS
HEART RATE: 71 BPM | DIASTOLIC BLOOD PRESSURE: 64 MMHG | OXYGEN SATURATION: 96 % | WEIGHT: 229.94 LBS | SYSTOLIC BLOOD PRESSURE: 128 MMHG | BODY MASS INDEX: 53.21 KG/M2 | HEIGHT: 55 IN

## 2023-03-16 DIAGNOSIS — M25.512 LEFT SHOULDER PAIN, UNSPECIFIED CHRONICITY: Primary | ICD-10-CM

## 2023-03-16 DIAGNOSIS — M25.512 LEFT SHOULDER PAIN, UNSPECIFIED CHRONICITY: ICD-10-CM

## 2023-03-16 DIAGNOSIS — S42.292A CLOSED FRACTURE OF HEAD OF LEFT HUMERUS, INITIAL ENCOUNTER: Primary | ICD-10-CM

## 2023-03-16 PROCEDURE — 1159F PR MEDICATION LIST DOCUMENTED IN MEDICAL RECORD: ICD-10-PCS | Mod: CPTII,,, | Performed by: PHYSICIAN ASSISTANT

## 2023-03-16 PROCEDURE — 99215 PR OFFICE/OUTPT VISIT, EST, LEVL V, 40-54 MIN: ICD-10-PCS | Mod: S$PBB,,, | Performed by: PHYSICIAN ASSISTANT

## 2023-03-16 PROCEDURE — 99214 OFFICE O/P EST MOD 30 MIN: CPT | Mod: PBBFAC | Performed by: PHYSICIAN ASSISTANT

## 2023-03-16 PROCEDURE — 99215 OFFICE O/P EST HI 40 MIN: CPT | Mod: S$PBB,,, | Performed by: PHYSICIAN ASSISTANT

## 2023-03-16 PROCEDURE — 3044F PR MOST RECENT HEMOGLOBIN A1C LEVEL <7.0%: ICD-10-PCS | Mod: CPTII,,, | Performed by: PHYSICIAN ASSISTANT

## 2023-03-16 PROCEDURE — 3008F PR BODY MASS INDEX (BMI) DOCUMENTED: ICD-10-PCS | Mod: CPTII,,, | Performed by: PHYSICIAN ASSISTANT

## 2023-03-16 PROCEDURE — 99999 PR PBB SHADOW E&M-EST. PATIENT-LVL IV: CPT | Mod: PBBFAC,,, | Performed by: PHYSICIAN ASSISTANT

## 2023-03-16 PROCEDURE — 3074F PR MOST RECENT SYSTOLIC BLOOD PRESSURE < 130 MM HG: ICD-10-PCS | Mod: CPTII,,, | Performed by: PHYSICIAN ASSISTANT

## 2023-03-16 PROCEDURE — 1159F MED LIST DOCD IN RCRD: CPT | Mod: CPTII,,, | Performed by: PHYSICIAN ASSISTANT

## 2023-03-16 PROCEDURE — 3008F BODY MASS INDEX DOCD: CPT | Mod: CPTII,,, | Performed by: PHYSICIAN ASSISTANT

## 2023-03-16 PROCEDURE — 3078F PR MOST RECENT DIASTOLIC BLOOD PRESSURE < 80 MM HG: ICD-10-PCS | Mod: CPTII,,, | Performed by: PHYSICIAN ASSISTANT

## 2023-03-16 PROCEDURE — 73030 X-RAY EXAM OF SHOULDER: CPT | Mod: TC,LT

## 2023-03-16 PROCEDURE — 73030 XR SHOULDER COMPLETE 2 OR MORE VIEWS LEFT: ICD-10-PCS | Mod: 26,LT,, | Performed by: RADIOLOGY

## 2023-03-16 PROCEDURE — 3074F SYST BP LT 130 MM HG: CPT | Mod: CPTII,,, | Performed by: PHYSICIAN ASSISTANT

## 2023-03-16 PROCEDURE — 1160F PR REVIEW ALL MEDS BY PRESCRIBER/CLIN PHARMACIST DOCUMENTED: ICD-10-PCS | Mod: CPTII,,, | Performed by: PHYSICIAN ASSISTANT

## 2023-03-16 PROCEDURE — 3078F DIAST BP <80 MM HG: CPT | Mod: CPTII,,, | Performed by: PHYSICIAN ASSISTANT

## 2023-03-16 PROCEDURE — 73030 X-RAY EXAM OF SHOULDER: CPT | Mod: 26,LT,, | Performed by: RADIOLOGY

## 2023-03-16 PROCEDURE — 1160F RVW MEDS BY RX/DR IN RCRD: CPT | Mod: CPTII,,, | Performed by: PHYSICIAN ASSISTANT

## 2023-03-16 PROCEDURE — 3044F HG A1C LEVEL LT 7.0%: CPT | Mod: CPTII,,, | Performed by: PHYSICIAN ASSISTANT

## 2023-03-16 PROCEDURE — 99999 PR PBB SHADOW E&M-EST. PATIENT-LVL IV: ICD-10-PCS | Mod: PBBFAC,,, | Performed by: PHYSICIAN ASSISTANT

## 2023-03-16 NOTE — PROGRESS NOTES
Subjective:      Patient ID: Jessi Linton is a 57 y.o. female.    Chief Complaint: Follow-up of the Left Forearm (Bruising), Follow-up of the Left Shoulder, and Swelling of the Left Wrist    Review of patient's allergies indicates:   Allergen Reactions    Tramadol Rash      Pt returns to clinic for follow up of left proximal humerus fx.  She reports no pain today.  Has started OT.    2/23/23:  Pt returns to clinic for follow up of left shoulder pain/proximal humerus fx.  She reports some improvement of pain    2/1/23:  58 yo RHD F presents to clinic with parents with c/o left shoulder pain.  She tripped and fell at home a few days ago, landed on her left shooulder.  Was seen in ED after injury and diagnosed with proximal humerus fx.  Has been wearing sling since.  Mom noticed bruising to pt's upper arm.  She has tried taking pain medication prescribed by ED but makes her feel sick so has been taking tylenol arthritis with good relief of pain.  Occasional numbness/tingling to fingers when wearing sling.      Review of Systems   Constitutional: Negative for chills, diaphoresis and fever.   HENT:  Negative for congestion, ear discharge and ear pain.    Eyes:  Negative for blurred vision, discharge, double vision and pain.   Cardiovascular:  Negative for chest pain, claudication and cyanosis.   Respiratory:  Negative for cough, hemoptysis and shortness of breath.    Endocrine: Negative for cold intolerance and heat intolerance.   Skin:  Negative for color change, dry skin, itching and rash.   Musculoskeletal:  Positive for falls and joint pain. Negative for arthritis, back pain, gout, joint swelling, muscle weakness and neck pain.   Gastrointestinal:  Negative for abdominal pain and change in bowel habit.   Neurological:  Negative for brief paralysis, disturbances in coordination and dizziness.   Psychiatric/Behavioral:  Negative for altered mental status and depression.        Objective:           General    Constitutional: She is oriented to person, place, and time. She appears well-developed and well-nourished. No distress.   HENT:   Head: Atraumatic.   Eyes: EOM are normal. Right eye exhibits no discharge. Left eye exhibits no discharge.   Cardiovascular:  Normal rate.            Pulmonary/Chest: Effort normal. No respiratory distress.   Abdominal: Soft.   Neurological: She is alert and oriented to person, place, and time.   Psychiatric: She has a normal mood and affect. Her behavior is normal.         Right Shoulder Exam     Inspection/Observation   Swelling: absent  Bruising: absent  Scars: absent  Deformity: absent    Other   Sensation: normal    Left Shoulder Exam     Inspection/Observation   Swelling: absent  Bruising: present (upper arm - improving)  Scars: absent  Deformity: absent    Other   Sensation: normal     Comments:  Tenderness to anterior shoulder    Limited exam due to fx and decreased ROM      Vascular Exam     Right Pulses      Radial:                    2+      Left Pulses      Radial:                    2+      Capillary Refill  Right Hand: normal capillary refill  Left Hand: normal capillary refill                Assessment:         Xray Left Shoulder 3/16/23:  There is a comminuted fracture of the left humeral head and neck, mildly displaced.  Minimal callus formation as compared to the prior study.    Xray Left Shoulder 1/28/23:  Comminuted displaced oblique fracture of the humeral head and neck with mild valgus angulation.      Encounter Diagnosis   Name Primary?    Closed fracture of head of left humerus, initial encounter Yes      Closed fracture of head of left humerus, initial encounter                 Plan:         Discussed diagnosis and treatment plan with pt and her parents. Xrays previously reviewed by Dr. Guerra who recommended treatment in sling and start PT in 3-4 weeks at last visit.    Sling as directed.  Occupational therapy as scheduled.  Continue tylenol as  directed as needed.  Ice compresses as directed.  RTC in 3 weeks for follow up with repeat xrays, sooner if needed.    Patient voices understanding of and agreement with treatment plan. All of the patient's questions were answered and the patient will contact us if she has any questions or concerns in the interim.

## 2023-03-22 ENCOUNTER — CLINICAL SUPPORT (OUTPATIENT)
Dept: REHABILITATION | Facility: HOSPITAL | Age: 58
End: 2023-03-22
Payer: MEDICAID

## 2023-03-22 DIAGNOSIS — M25.622 DECREASED RANGE OF MOTION OF ELBOW, LEFT: ICD-10-CM

## 2023-03-22 DIAGNOSIS — M79.602 ARM PAIN, ANTERIOR, LEFT: Primary | ICD-10-CM

## 2023-03-22 DIAGNOSIS — R29.898 DECREASED GRIP STRENGTH OF LEFT HAND: ICD-10-CM

## 2023-03-22 DIAGNOSIS — M25.612 DECREASED RANGE OF MOTION OF LEFT SHOULDER: ICD-10-CM

## 2023-03-22 PROCEDURE — 97530 THERAPEUTIC ACTIVITIES: CPT | Mod: PN

## 2023-03-22 NOTE — PATIENT INSTRUCTIONS
Complete previous exercises as written on handout. Arm curls and extensions may be completed for 2 sets of 10 with yellow band daily. If shoulder pain increases, stop until OT follow up.

## 2023-03-22 NOTE — PROGRESS NOTES
"  [facility]  Occupational Therapy Treatment Note    Date: 3/22/2023  Name: Jessi Linton  Clinic Number: [id number]    Therapy Diagnosis:   Encounter Diagnoses   Name Primary?    Arm pain, anterior, left Yes    Decreased range of motion of left shoulder     Decreased range of motion of elbow, left     Decreased  strength of left hand        Physician: Jaycee Adam PA-C      Physician Orders: OT evaluate and treat  Medical Diagnosis: Closed fracture of head of left humerus.  Surgical Procedure and Date: NA  Evaluation Date: 3/1/2023  Insurance Authorization Period Expiration: 3/9/2023 - 12/31/2023  Plan of Care Certification Period: 3/1/2023 - 4/28/2023  Progress Note Due: 3/31/2023   Date of Return to MD: 3/16/2023    # Visits Authorized           3  / 20    Precautions:  Fall and Weightbearing    Time In: 8:10 am  Time Out:  8:55 am  Total Billable Time: 40 minutes    SUBJECTIVE     Pt reports: "My shoulder isn't hurting today."  She was compliant with home exercise program given last session.     Response to previous treatment:"good"      Pain:    0/10 prior to session    /10 end of session    Location: left shoulder      OBJECTIVE    Measurements updated at progress report unless specified.    Treatment     Jessi received the treatments listed below:     Supervised modalities after being cleared for contradictions for 10 minutes to include:   - Heat Pack - Applied to left shoulder to increase muscle relaxation, elasticity, and circulation prior to session to facilitate participation in therapy for 10 minutes with concurrent gripping exercises including:  - Green digiflex composite flexion: 3 x 10  - Yellow digiflex individual digit flexion: 1 x 10  - Red weighted clip dtripod pinch:  - Red weighted clip lateral pinch: 3 x 10    Therapeutic exercises to develop  and pinch strength, endurance, ROM, flexibility, and scapular stabilization for 30 minutes, including:  - " Tabletop shoulder slides forward flexion: 2 x 10  - Tabletop horizontal abduction/adduction: 2 x 10   - Passive range of motion shoulder flexion, abduction, and external rotation: 2 x 20 each  - Scapular elevation: 2 x 10  - Scapular retraction: 2 x 10  - Bicep curl: 3 x 10  - Triceps extension: 3 x 10    Patient Education and Home Exercises      Education provided:   - written HEP of ST and GH mobility  - Progress towards goals     Written Home Exercises Provided: yes.  Exercises were reviewed and Jessi was able to demonstrate them prior to the end of the session.  Jessi demonstrated fair  understanding of the HEP provided. See EMR under Patient Instructions for exercises provided during therapy sessions.       Assessment     Pt with increased joint mobility and tolerance to session today. Passive range of motion performed to left shoulder with Pt seated inclined on hi/lo mat. Pt unable to tolerate laying supine due to reported pain in shoulder. Range of motion remains limited in shoulder tolerating ~60 degrees abduction, ~75 degrees flexion and ~15 degrees external rotation. Improved scapular mobility noted during retraction and elevation exercises with significantly reduced elbow stiffness and guarding. Pt would continue to benefit from skilled OT to address goals and deficits stated below     Jessi is progressing well towards her goals and there are no updates to goals at this time. Pt prognosis is Good.     Pt will continue to benefit from skilled outpatient occupational therapy to address the deficits listed in the problem list on initial evaluation provide pt/family education and to maximize pt's level of independence in the home and community environment.     Pt's spiritual, cultural and educational needs considered and pt agreeable to plan of care and goals.    Anticipated barriers to occupational therapy: guarding of (L) UE      Goals:   The following goals were discussed with the patient and patient is in  agreement with them as to be addressed in the treatment plan.      Short Term Goals (STGs); to be met within 4 weeks.  STG #1: Pt will report 4 out of 10 pain level with with movement of left upper extremity.  STG #2: Pt will demonstrate increased ROM of ~80 degrees shoulder flexion and abduction in order increase functional use of UE  STG #3: Pt will increase elbow MMT to 3+/5 in elbow flexion/extension in order to improve functional use of UE.  STG #4: Pt will demonstrate independence with issued HEP.   STG #5: Pt will demonstrate increased  strength of 10 #s in order to increase functional use of UE.  STG #6 Pt to have shoulder MMT tested when appropriate.  STG #7: Pt to improve active range of motion external rotation to 20 degrees for improved functional use of left upper extremity.     Long Term Goals (LTGs); to be met by discharge.  LTG #1: Pt will report a pain level of 2 out of 10 with movement of left upper extremity.       LTG #2: Pt will demo improved FOTO score to predicted level.  LTG #3: Pt will return to prior level of function for all ADL tasks at home.   LTG #4: Pt will demonstrate increased ROM WFL in all elbow and shoulder movements in order increase functional use of UE  LTG #5: Pt will increase shoulder MMT within WFL in order to improve functional use of UE.  LTG #6: Pt will demonstrate increased  strength of 15#s in order to increase functional use of UE.        PLAN     Continue OT 2 times a week x's 8 weeks      Paco Ring OT

## 2023-03-23 ENCOUNTER — OFFICE VISIT (OUTPATIENT)
Dept: FAMILY MEDICINE | Facility: CLINIC | Age: 58
End: 2023-03-23
Payer: MEDICAID

## 2023-03-23 VITALS
HEIGHT: 55 IN | SYSTOLIC BLOOD PRESSURE: 138 MMHG | DIASTOLIC BLOOD PRESSURE: 76 MMHG | RESPIRATION RATE: 18 BRPM | BODY MASS INDEX: 53.41 KG/M2 | HEART RATE: 80 BPM | WEIGHT: 230.81 LBS

## 2023-03-23 DIAGNOSIS — R07.2 PRECORDIAL PAIN: Primary | ICD-10-CM

## 2023-03-23 DIAGNOSIS — R60.9 EDEMA, UNSPECIFIED TYPE: ICD-10-CM

## 2023-03-23 DIAGNOSIS — L03.115 CELLULITIS OF RIGHT LOWER EXTREMITY: ICD-10-CM

## 2023-03-23 PROCEDURE — 99214 OFFICE O/P EST MOD 30 MIN: CPT | Mod: S$PBB,,, | Performed by: FAMILY MEDICINE

## 2023-03-23 PROCEDURE — 3078F PR MOST RECENT DIASTOLIC BLOOD PRESSURE < 80 MM HG: ICD-10-PCS | Mod: CPTII,,, | Performed by: FAMILY MEDICINE

## 2023-03-23 PROCEDURE — 99999 PR PBB SHADOW E&M-EST. PATIENT-LVL II: ICD-10-PCS | Mod: PBBFAC,,, | Performed by: FAMILY MEDICINE

## 2023-03-23 PROCEDURE — 99212 OFFICE O/P EST SF 10 MIN: CPT | Mod: PBBFAC | Performed by: FAMILY MEDICINE

## 2023-03-23 PROCEDURE — 1159F MED LIST DOCD IN RCRD: CPT | Mod: CPTII,,, | Performed by: FAMILY MEDICINE

## 2023-03-23 PROCEDURE — 3078F DIAST BP <80 MM HG: CPT | Mod: CPTII,,, | Performed by: FAMILY MEDICINE

## 2023-03-23 PROCEDURE — 3008F BODY MASS INDEX DOCD: CPT | Mod: CPTII,,, | Performed by: FAMILY MEDICINE

## 2023-03-23 PROCEDURE — 1160F RVW MEDS BY RX/DR IN RCRD: CPT | Mod: CPTII,,, | Performed by: FAMILY MEDICINE

## 2023-03-23 PROCEDURE — 3044F PR MOST RECENT HEMOGLOBIN A1C LEVEL <7.0%: ICD-10-PCS | Mod: CPTII,,, | Performed by: FAMILY MEDICINE

## 2023-03-23 PROCEDURE — 3075F PR MOST RECENT SYSTOLIC BLOOD PRESS GE 130-139MM HG: ICD-10-PCS | Mod: CPTII,,, | Performed by: FAMILY MEDICINE

## 2023-03-23 PROCEDURE — 3008F PR BODY MASS INDEX (BMI) DOCUMENTED: ICD-10-PCS | Mod: CPTII,,, | Performed by: FAMILY MEDICINE

## 2023-03-23 PROCEDURE — 1159F PR MEDICATION LIST DOCUMENTED IN MEDICAL RECORD: ICD-10-PCS | Mod: CPTII,,, | Performed by: FAMILY MEDICINE

## 2023-03-23 PROCEDURE — 1160F PR REVIEW ALL MEDS BY PRESCRIBER/CLIN PHARMACIST DOCUMENTED: ICD-10-PCS | Mod: CPTII,,, | Performed by: FAMILY MEDICINE

## 2023-03-23 PROCEDURE — 99214 PR OFFICE/OUTPT VISIT, EST, LEVL IV, 30-39 MIN: ICD-10-PCS | Mod: S$PBB,,, | Performed by: FAMILY MEDICINE

## 2023-03-23 PROCEDURE — 3075F SYST BP GE 130 - 139MM HG: CPT | Mod: CPTII,,, | Performed by: FAMILY MEDICINE

## 2023-03-23 PROCEDURE — 99999 PR PBB SHADOW E&M-EST. PATIENT-LVL II: CPT | Mod: PBBFAC,,, | Performed by: FAMILY MEDICINE

## 2023-03-23 PROCEDURE — 3044F HG A1C LEVEL LT 7.0%: CPT | Mod: CPTII,,, | Performed by: FAMILY MEDICINE

## 2023-03-23 RX ORDER — LEVOFLOXACIN 500 MG/1
500 TABLET, FILM COATED ORAL DAILY
Qty: 7 TABLET | Refills: 0 | Status: SHIPPED | OUTPATIENT
Start: 2023-03-23 | End: 2023-03-30

## 2023-03-23 NOTE — PROGRESS NOTES
Subjective:       Patient ID: Jessi Linton is a 57 y.o. female.    Chief Complaint: Follow-up (Pt was seen in ER for Chest pain, Pt states she does not have chest pain today currently in clinic.)    Pt is a 57 y.o. female who presents for evaluation and management of   Encounter Diagnoses   Name Primary?    Precordial pain Yes    Edema, unspecified type     Cellulitis of right lower extremity    .ED f/u for CP   EKG and serial TPN negative   Atypical CP  No exertional CP     Doing well on current meds. Denies any side effects. Prevention is up to date.  Review of Systems   Constitutional:  Negative for fever.   Respiratory:  Negative for shortness of breath.    Cardiovascular:  Positive for leg swelling. Negative for chest pain.        One isolated episode of CP at rest that brought her to ED   No exertional CP    Skin:  Positive for color change.     Objective:      Physical Exam  Constitutional:       Appearance: She is well-developed. She is obese.   HENT:      Head: Normocephalic and atraumatic.      Right Ear: External ear normal.      Left Ear: External ear normal.      Nose: Nose normal.   Eyes:      Pupils: Pupils are equal, round, and reactive to light.   Neck:      Thyroid: No thyromegaly.      Vascular: No JVD.      Trachea: No tracheal deviation.   Cardiovascular:      Rate and Rhythm: Normal rate.      Heart sounds: Normal heart sounds. No murmur heard.  Pulmonary:      Effort: Pulmonary effort is normal. No respiratory distress.      Breath sounds: Normal breath sounds. No wheezing or rales.   Chest:      Chest wall: No tenderness.   Abdominal:      General: Bowel sounds are normal. There is no distension.      Palpations: Abdomen is soft. There is no mass.      Tenderness: There is no abdominal tenderness. There is no guarding or rebound.   Musculoskeletal:         General: No tenderness. Normal range of motion.      Cervical back: Normal range of motion and neck supple.      Right lower leg:  Edema present.      Left lower leg: Edema present.   Lymphadenopathy:      Cervical: No cervical adenopathy.   Skin:     General: Skin is warm and dry.      Coloration: Skin is not pale.      Findings: Erythema present. No rash.      Comments: Mild patchy erythema of RLE      Neurological:      Mental Status: She is alert and oriented to person, place, and time.      Cranial Nerves: No cranial nerve deficit.      Motor: No abnormal muscle tone.      Coordination: Coordination normal.      Deep Tendon Reflexes: Reflexes are normal and symmetric. Reflexes normal.   Psychiatric:         Behavior: Behavior normal.         Thought Content: Thought content normal.         Judgment: Judgment normal.       Assessment:       1. Precordial pain    2. Edema, unspecified type    3. Cellulitis of right lower extremity          Plan:   1. Precordial pain    2. Edema, unspecified type    3. Cellulitis of right lower extremity  -     levoFLOXacin (LEVAQUIN) 500 MG tablet; Take 1 tablet (500 mg total) by mouth once daily. for 7 days  Dispense: 7 tablet; Refill: 0    CP likely of MSK origin due to her PT for humerus fracture     ACE wrap BLE since she cant wear the compression stockings     No follow-ups on file.

## 2023-03-24 ENCOUNTER — TELEPHONE (OUTPATIENT)
Dept: ORTHOPEDICS | Facility: CLINIC | Age: 58
End: 2023-03-24
Payer: MEDICAID

## 2023-03-24 ENCOUNTER — TELEPHONE (OUTPATIENT)
Dept: FAMILY MEDICINE | Facility: CLINIC | Age: 58
End: 2023-03-24
Payer: MEDICAID

## 2023-03-24 ENCOUNTER — CLINICAL SUPPORT (OUTPATIENT)
Dept: REHABILITATION | Facility: HOSPITAL | Age: 58
End: 2023-03-24
Attending: FAMILY MEDICINE
Payer: MEDICAID

## 2023-03-24 DIAGNOSIS — R29.898 DECREASED GRIP STRENGTH OF LEFT HAND: ICD-10-CM

## 2023-03-24 DIAGNOSIS — M25.612 DECREASED RANGE OF MOTION OF LEFT SHOULDER: ICD-10-CM

## 2023-03-24 DIAGNOSIS — M79.602 ARM PAIN, ANTERIOR, LEFT: Primary | ICD-10-CM

## 2023-03-24 DIAGNOSIS — M25.622 DECREASED RANGE OF MOTION OF ELBOW, LEFT: ICD-10-CM

## 2023-03-24 PROCEDURE — 97530 THERAPEUTIC ACTIVITIES: CPT | Mod: PN

## 2023-03-24 NOTE — TELEPHONE ENCOUNTER
Called pt no answer lvm          ----- Message from Ashley Ochoa sent at 3/24/2023 12:55 PM CDT -----  Contact: Kassandra/mother  Jessi Linton  MRN: [id number]  : 1965  PCP: David Allred  Home Phone      [phone]  Work Phone      Not on file.  Mobile          [phone]      MESSAGE:   Pt called stating that [facility] was having trouble seeing pt's xray in Epic that was done on 3/16. Xray is dated as ORDERED on  but COMPLETED on 3/16.        Phone:  [phone]

## 2023-03-24 NOTE — TELEPHONE ENCOUNTER
Xray noted in chart for 23            ----- Message from Ashley Ochoa sent at 3/24/2023 12:55 PM CDT -----  Contact: Kassandra/mother  Jessi Linton  MRN: [id number]  : 1965  PCP: David Allred  Home Phone      [phone]  Work Phone      Not on file.  Mobile          [phone]      MESSAGE:   Pt called stating that [facility] was having trouble seeing pt's xray in Epic that was done on 3/16. Xray is dated as ORDERED on  but COMPLETED on 3/16.        Phone:  [phone]

## 2023-03-24 NOTE — PROGRESS NOTES
"  [facility]  Occupational Therapy Treatment Note    Date: 3/24/2023  Name: Jessi Linton  Clinic Number: [id number]    Therapy Diagnosis:   Encounter Diagnoses   Name Primary?    Arm pain, anterior, left Yes    Decreased range of motion of left shoulder     Decreased range of motion of elbow, left     Decreased  strength of left hand        Physician: Jaycee Adam PA-C      Physician Orders: OT evaluate and treat  Medical Diagnosis: Closed fracture of head of left humerus.  Surgical Procedure and Date: NA  Evaluation Date: 3/1/2023  Insurance Authorization Period Expiration: 3/9/2023 - 12/31/2023  Plan of Care Certification Period: 3/1/2023 - 4/28/2023  Progress Note Due: 3/31/2023   Date of Return to MD: 3/16/2023    # Visits Authorized           4 / 20    Precautions:  Fall and Weightbearing    Time In: 8:05 am  Time Out:  8:50 am  Total Billable Time: 45 minutes    SUBJECTIVE     Pt reports: "Im doing well. I started drying the dishes but I still cant sleeping my recliner." Per pt  She was compliant with home exercise program given last session.     Response to previous treatment:"good"      Pain:    0/10 prior to session    /10 end of session (reported she was in pain but could not quantify)  Location: left shoulder  quantify to     OBJECTIVE    Measurements updated at progress report unless specified.    Treatment     Jessi received the treatments listed below:     Supervised modalities after being cleared for contradictions for 10 minutes to include:   - Heat Pack - Applied to left shoulder to increase muscle relaxation, elasticity, and circulation prior to session to facilitate participation in therapy for 10 minutes with concurrent gripping exercises including:  - Green digiflex composite flexion: 3 x 10  - Green digiflex individual: 1 x 10  - green weighted clip tripod pinch:  - Green weighted clip lateral pinch: 3 x 10   - ball squeezes 3x10  - green web for digit 2-5 " extension.1x10      Therapeutic exercises to develop  and pinch strength, endurance, ROM, flexibility, and scapular stabilization for 35 minutes, including:  - Passive range of motion shoulder flexion, abduction, and external rotation: 3x10 each with mom present  - Scapular elevation: 3 x 10 with 2# hand weights  - Scapular retraction: 3 x 10 with 2# hand weights  - Pendulums all planes 3 min  - towel table top slides for active assistive range of motion (B) shoulder flexion. 1x10 seated at table  - towel table top slides for active assistive range of motion (B) shoulder horizontal abd/adduction. 1x10 seated at table  - yellow theraband Bicep curl: 3 x 10  - yellow therabandTriceps extension: 3 x 10  - passive range of motion supine on mat with stretch and hold 5 seconds to  (L) shoulder flexion, extension, abduction, and External rotation tolerated to end range all planes with exception of abduction  - Pt's mom was brought into therapy gym to discuss Jessi's progress and show increased (L) shoulder joint mobility and to educated on further exercises to continue with HEP    Patient Education and Home Exercises      Education provided:   - written HEP of ST and GH mobility  - Progress towards goals     Written Home Exercises Provided: yes.  Exercises were reviewed and Jessi was able to demonstrate them prior to the end of the session.  Jessi demonstrated fair  understanding of the HEP provided. See EMR under Patient Instructions for exercises provided during therapy sessions.       Assessment     Pt with increased tolerance to her session today. Passive range of motion has significantly improved noted to day, with pt able to achieve full range of external rotation, flexion, extension, and internal rotation.  Pt has no complaints and was excited about her progress; however when asked of her mom could enter the gym area to show her progress and educate her on new home programs, pt reported same exercises were very  painful and she couldn't do them.  Then pt using her (L) upper extremity to push and transfer herself from a supine to seated positioning using her (L) shoulder to push her self up and weightbearing with no complaints of pain. OT also educated patient to discontinue arm splint.  Range of motion remains limited in shoulder however improved to tolerating ~90 degrees abduction, 120 degrees flexion and 45 degrees external rotation. Improved scapular mobility noted during retraction and elevation exercises with increased strength , and significantly reduced guarding. Pt would continue to benefit from skilled OT to address goals and deficits stated below     Jessi is progressing well towards her goals and there are no updates to goals at this time. Pt prognosis is Good.     Pt will continue to benefit from skilled outpatient occupational therapy to address the deficits listed in the problem list on initial evaluation provide pt/family education and to maximize pt's level of independence in the home and community environment.     Pt's spiritual, cultural and educational needs considered and pt agreeable to plan of care and goals.    Anticipated barriers to occupational therapy: guarding of (L) UE      Goals:   The following goals were discussed with the patient and patient is in agreement with them as to be addressed in the treatment plan.      Short Term Goals (STGs); to be met within 4 weeks.  STG #1: Pt will report 4 out of 10 pain level with with movement of left upper extremity.  STG #2: Pt will demonstrate increased ROM of ~80 degrees shoulder flexion and abduction in order increase functional use of UE  STG #3: Pt will increase elbow MMT to 3+/5 in elbow flexion/extension in order to improve functional use of UE.  STG #4: Pt will demonstrate independence with issued HEP.   STG #5: Pt will demonstrate increased  strength of 10 #s in order to increase functional use of UE.  STG #6 Pt to have shoulder MMT tested  when appropriate.  STG #7: Pt to improve active range of motion external rotation to 20 degrees for improved functional use of left upper extremity.     Long Term Goals (LTGs); to be met by discharge.  LTG #1: Pt will report a pain level of 2 out of 10 with movement of left upper extremity.       LTG #2: Pt will demo improved FOTO score to predicted level.  LTG #3: Pt will return to prior level of function for all ADL tasks at home.   LTG #4: Pt will demonstrate increased ROM WFL in all elbow and shoulder movements in order increase functional use of UE  LTG #5: Pt will increase shoulder MMT within WFL in order to improve functional use of UE.  LTG #6: Pt will demonstrate increased  strength of 15#s in order to increase functional use of UE.        PLAN     Continue OT 2 times a week x's 8 weeks      Magda Rojas, OT

## 2023-03-24 NOTE — TELEPHONE ENCOUNTER
----- Message from Linda Landry sent at 3/23/2023 12:45 PM CDT -----  Contact: pt  Pt mom calling  in regards to pt primary Dr not being able to access pt x-rays taken on 3-16    Confirmed patient's contact info below:  Contact Name: Jessi Linton  Phone Number: [phone]

## 2023-03-24 NOTE — TELEPHONE ENCOUNTER
Informed mother xrays from 3/16 are available in [facility]. If therapy and pcp are unable to pull up they may need to contact [facility] for possible issue with computers. Xray was ordered on 2/23 and completed on 3/16. There was xray ordered on 3/16 that is future order for 4/6 appt.

## 2023-03-27 ENCOUNTER — CLINICAL SUPPORT (OUTPATIENT)
Dept: REHABILITATION | Facility: HOSPITAL | Age: 58
End: 2023-03-27
Attending: PHYSICIAN ASSISTANT
Payer: MEDICAID

## 2023-03-27 DIAGNOSIS — R29.898 DECREASED GRIP STRENGTH OF LEFT HAND: ICD-10-CM

## 2023-03-27 DIAGNOSIS — M25.612 DECREASED RANGE OF MOTION OF LEFT SHOULDER: ICD-10-CM

## 2023-03-27 DIAGNOSIS — M79.602 ARM PAIN, ANTERIOR, LEFT: Primary | ICD-10-CM

## 2023-03-27 DIAGNOSIS — M25.622 DECREASED RANGE OF MOTION OF ELBOW, LEFT: ICD-10-CM

## 2023-03-27 PROCEDURE — 97530 THERAPEUTIC ACTIVITIES: CPT | Mod: PN

## 2023-03-27 NOTE — PROGRESS NOTES
[facility]  Occupational Therapy Treatment Note    Date: 3/27/2023  Name: Jessi Linton  Clinic Number: [id number]    Therapy Diagnosis:   Encounter Diagnoses   Name Primary?    Arm pain, anterior, left Yes    Decreased range of motion of left shoulder     Decreased range of motion of elbow, left     Decreased  strength of left hand        Physician: Jaycee Adam PA-C      Physician Orders: OT evaluate and treat  Medical Diagnosis: Closed fracture of head of left humerus.  Surgical Procedure and Date: NA  Evaluation Date: 3/1/2023  Insurance Authorization Period Expiration: 3/9/2023 - 12/31/2023  Plan of Care Certification Period: 3/1/2023 - 4/28/2023  Progress Note Due: 3/31/2023   Date of Return to MD: 3/16/2023    # Visits Authorized   5 / 20    Precautions:  Fall and Weightbearing    Time In: 11:00 am  Time Out:  11:45 am  Total Billable Time: 40 minutes    SUBJECTIVE     Pt reports: No new complaints  She was compliant with home exercise program given last session.     Response to previous treatment: No adverse reactions      Pain:    0/10 prior to session    0/10 end of session    Location: left shoulder      OBJECTIVE    Measurements updated at progress report unless specified.    Treatment     Jessi received the treatments listed below:     Supervised modalities after being cleared for contradictions for 15 minutes to include:   - Heat Pack - Applied to left shoulder to increase muscle relaxation, elasticity, and circulation prior to session to facilitate participation in therapy for 10 minutes with concurrent gripping exercises including:  - Green digiflex composite flexion: 3 x 10  - Yellow digiflex individual digit flexion: 1 x 10  - Red weighted clip dtripod pinch:  - Red weighted clip lateral pinch: 3 x 10    Cold Pack - Applied to left shoulder to reduce post session inflammation and pain following therapy for 5 minutes.    Therapeutic exercises to develop  and  pinch strength, endurance, ROM, flexibility, and scapular stabilization for 30 minutes, including:  - Seated tabletop shoulder flexion slides: 2 x 10  - Seated tabletop shoulder abduction slides: 2 x 10  - Standing shoulder flexion slides on wall with pillowcase: 2 x 10  - Passive range of motion shoulder flexion, abduction, and external rotation: 2 x 20 each  - active range of motion shoulder flexion: 3 x 5  - active range of motion shoulder abduction with elbows flexed: 3 x 5  - active range of motion shoulder extension: 3 x 5  - Resisted external rotation with yellow band: 3 x 5  - Resisted internal rotation with yellow band: 3 x 5  - Resisted scapular retraction with yellow band: 2 x 10  - Resisted scapular elevation with 2# dumbbell: 2 x 10    Patient Education and Home Exercises      Education provided:   - written HEP of ST and GH mobility  - Progress towards goals     Written Home Exercises Provided: yes.  Exercises were reviewed and Jessi was able to demonstrate them prior to the end of the session.  Jessi demonstrated fair  understanding of the HEP provided. See EMR under Patient Instructions for exercises provided during therapy sessions.       Assessment     Pt with increased joint mobility and tolerance to session today. Upon entry Pt reported no pain in shoulder at rest or during movement. She states that she is having less pain overall and did not sleep in sling last night with no pain reported. Passive range of motion performed in all planes with good tolerance and no pain reported. Supine flexion and abduction reaching ~90 degrees on inclined mat due to Pt unable to tolerate laying supine. Pt performed active range of motion exercises in shoulder flexion and abduction seated on mat with minor deviation from right side. Scapular mobility with noted improvement with Pt tolerating increased resistances in retraction and elevation. Continued bruising noted on anterior elbow to biceps of left upper  extremity. Pt would continue to benefit from skilled OT to address goals and deficits stated below     Jessi is progressing well towards her goals and there are no updates to goals at this time. Pt prognosis is Good.     Pt will continue to benefit from skilled outpatient occupational therapy to address the deficits listed in the problem list on initial evaluation provide pt/family education and to maximize pt's level of independence in the home and community environment.     Pt's spiritual, cultural and educational needs considered and pt agreeable to plan of care and goals.    Anticipated barriers to occupational therapy: guarding of (L) UE      Goals:   The following goals were discussed with the patient and patient is in agreement with them as to be addressed in the treatment plan.      Short Term Goals (STGs); to be met within 4 weeks.  STG #1: Pt will report 4 out of 10 pain level with with movement of left upper extremity.  STG #2: Pt will demonstrate increased ROM of ~80 degrees shoulder flexion and abduction in order increase functional use of UE  STG #3: Pt will increase elbow MMT to 3+/5 in elbow flexion/extension in order to improve functional use of UE.  STG #4: Pt will demonstrate independence with issued HEP. (MET)  STG #5: Pt will demonstrate increased  strength of 10 #s in order to increase functional use of UE.  STG #6 Pt to have shoulder MMT tested when appropriate.  STG #7: Pt to improve active range of motion external rotation to 20 degrees for improved functional use of left upper extremity.     Long Term Goals (LTGs); to be met by discharge.  LTG #1: Pt will report a pain level of 2 out of 10 with movement of left upper extremity.       LTG #2: Pt will demo improved FOTO score to predicted level.  LTG #3: Pt will return to prior level of function for all ADL tasks at home.   LTG #4: Pt will demonstrate increased ROM WFL in all elbow and shoulder movements in order increase functional use  of UE  LTG #5: Pt will increase shoulder MMT within WFL in order to improve functional use of UE.  LTG #6: Pt will demonstrate increased  strength of 15#s in order to increase functional use of UE.        PLAN     Continue OT 2 times a week x's 8 weeks      Paco Ring OT

## 2023-03-27 NOTE — PATIENT INSTRUCTIONS
Perform for 2 sets of 10 every other day to allow rest day between sessions. If pain in shoulder increases, stop exercise until OT follow - up.

## 2023-03-29 ENCOUNTER — TELEPHONE (OUTPATIENT)
Dept: FAMILY MEDICINE | Facility: CLINIC | Age: 58
End: 2023-03-29
Payer: MEDICAID

## 2023-03-29 ENCOUNTER — CLINICAL SUPPORT (OUTPATIENT)
Dept: REHABILITATION | Facility: HOSPITAL | Age: 58
End: 2023-03-29
Payer: MEDICAID

## 2023-03-29 DIAGNOSIS — M25.612 DECREASED RANGE OF MOTION OF LEFT SHOULDER: ICD-10-CM

## 2023-03-29 DIAGNOSIS — M79.602 ARM PAIN, ANTERIOR, LEFT: Primary | ICD-10-CM

## 2023-03-29 DIAGNOSIS — M25.622 DECREASED RANGE OF MOTION OF ELBOW, LEFT: ICD-10-CM

## 2023-03-29 DIAGNOSIS — R29.898 DECREASED GRIP STRENGTH OF LEFT HAND: ICD-10-CM

## 2023-03-29 PROCEDURE — 97530 THERAPEUTIC ACTIVITIES: CPT | Mod: PN

## 2023-03-29 NOTE — TELEPHONE ENCOUNTER
----- Message from Ephraim Cowart sent at 3/29/2023  8:51 AM CDT -----  Contact: Patient  Jessi Linton  MRN: [id number]  : 1965  PCP: David Allred  Home Phone      [phone]  Work Phone      Not on file.  Mobile          [phone]      MESSAGE: states leg still red - has 1 dose of antibiotics left -- do you want to see her again or give refill -- please advise    Call [phone]    PCP: Brigida

## 2023-03-29 NOTE — TELEPHONE ENCOUNTER
Pt states     leg still red - has 1 dose of antibiotics left -- do you want to see her again or give refill     please advise

## 2023-03-29 NOTE — PROGRESS NOTES
"  [facility]  Occupational Therapy Treatment Note    Date: 3/29/2023  Name: Jessi Linton  Clinic Number: [id number]    Therapy Diagnosis:   Encounter Diagnoses   Name Primary?    Arm pain, anterior, left Yes    Decreased range of motion of left shoulder     Decreased range of motion of elbow, left     Decreased  strength of left hand        Physician: Jaycee Adam PA-C      Physician Orders: OT evaluate and treat  Medical Diagnosis: Closed fracture of head of left humerus.  Surgical Procedure and Date: NA  Evaluation Date: 3/1/2023  Insurance Authorization Period Expiration: 3/9/2023 - 12/31/2023  Plan of Care Certification Period: 3/1/2023 - 4/28/2023  Progress Note Due: 3/31/2023   Date of Return to MD: 3/16/2023    # Visits Authorized   6 / 20    Precautions:  Fall and Weightbearing    Time In: 11:00 am  Time Out:  11:45 am  Total Billable Time: 40 minutes    SUBJECTIVE     Pt reports: "I dressed myself for the first time since my fall last night."  She was compliant with home exercise program given last session.     Response to previous treatment: No adverse reactions      Pain:    0/10 prior to session    0/10 end of session    Location: left shoulder      OBJECTIVE    Measurements updated at progress report unless specified.    Treatment     Jessi received the treatments listed below:     Supervised modalities after being cleared for contradictions for 5 minutes to include:   - Heat Pack - Applied to left shoulder to increase muscle relaxation, elasticity, and circulation prior to session to facilitate participation in therapy for 5 minutes.      Therapeutic Exercise completed for 35 minutes for increased strength and range of motion including:    3/2/2023 - 3/29/2023   Active Range of Motion:  (Measured in degrees)    Left   Shld Flexion 0 > 90   Shld Extension 5 > 30   Shld Abduction (Arm rests at 55 degrees abduction at Pt side) reached 60 actively > 85   Shld Adduction " 0 > 0   Shld Ext Rotation (Arm rests at ~30 degrees internal rotation against Pt body) external rotation to neutral. > 10   Shld Int Rotation 30 degrees internal rotation at rest > 35 inclined mat   Elbow Flexion (Pt elbow resting at 80 degrees flexion) reached 90 actively. > 140   Elbow Extension 68 > 180         Manual Muscle Test within available range of motion     Left   Shld Flexion NT > -4/5   Shld Extension NT > -4/5   Shld Abduction NT > -4/5   Shld Adduction NT > -4/5   Shld Ext Rotation NT > -4/5   Shld Int Rotation NT > -4/5   Elbow Flexion 3/5 > -4/5   Elbow Extension 3/5 > -4/5   Elbow Supination -4/5 > -4/5   Elbow Pronation -4/5 > -4/5      Passive range of motion performed on incline on hi/lo mat: (Measured in degrees)   - Shoulder flexion: 115  - Shoulder abduction: 95  - External rotation: 10      Strength (Dynamometer/Measured in pounds on Rung II) and Pinch Strength (Pinch Gauge)    3/2/2023 > 3/29/2023 3/2/2023 > 3/29/2023     Right Left   Composite 30 > 30 8 > 20   Lateral Pinch 13 > 14 10 > 10   Tripod Pinch 13 > 14        5 > 8         Opposition (Fine Motor Skills/Dexterity): WNL          Wound/Scar management: Reduced bruising with bruising at bicep.    Sensation: Pt reports no numbness or loss of sensation at this time.      Edema: None     Updated FOTO score: See media section. (Required extended time for FOTO due to setting up simulated tasks for Pt to complete due to uncertainty with left upper extremity function. Also, Pt requested presence of mother during survey to discuss Pt level of function.)      Patient Education and Home Exercises      Education provided:   - written HEP of ST and GH mobility  - Progress towards goals     Written Home Exercises Provided: yes.  Exercises were reviewed and Jessi was able to demonstrate them prior to the end of the session.  Jessi demonstrated fair  understanding of the HEP provided. See EMR under Patient Instructions for exercises provided  during therapy sessions.       Assessment     Pt with increased joint mobility and tolerance to session today. Upon entry Pt reported no pain in shoulder or arm. She reports that she is able to dress herself for the first time since her injury. Updated measurements taken this session with improvement in active shoulder range of motion and strength in all movements as documented above. Elbow range of motion with significant improvement reaching WNL of elbow flexion and extension. Left  strength with 12lbs increase with Pt reporting increased functional use participating in crocheting. Significant increase in FOTO score improving from 4 at intake to 56 at most recent survey with predicted outcome being 50. Currently Pt is meeting 6/7 short term goals and 1/6 long term goals. Pt would continue to benefit from skilled OT to address goals and deficits stated below. Jessi is progressing well towards her goals and there are no updates to goals at this time. Pt prognosis is Good. Pt will continue to benefit from skilled outpatient occupational therapy to address the deficits listed in the problem list on initial evaluation provide pt/family education and to maximize pt's level of independence in the home and community environment.     Pt's spiritual, cultural and educational needs considered and pt agreeable to plan of care and goals.    Anticipated barriers to occupational therapy: guarding of (L) UE      Goals:   The following goals were discussed with the patient and patient is in agreement with them as to be addressed in the treatment plan.      Short Term Goals (STGs); to be met within 4 weeks.  STG #1: Pt will report 4 out of 10 pain level with with movement of left upper extremity. (MET)  STG #2: Pt will demonstrate increased ROM of ~80 degrees shoulder flexion and abduction in order increase functional use of upper extremity (MET)  STG #3: Pt will increase elbow MMT to 3+/5 in elbow flexion/extension in order to  improve functional use of UE. (MET)  STG #4: Pt will demonstrate independence with issued HEP. (MET)  STG #5: Pt will demonstrate increased  strength of 10 #s in order to increase functional use of UE. (MET)  STG #6 Pt to have shoulder MMT tested when appropriate.(MET)  STG #7: Pt to improve active range of motion external rotation to 20 degrees for improved functional use of left upper extremity. (Ongoing)     Long Term Goals (LTGs); to be met by discharge.  LTG #1: Pt will report a pain level of 2 out of 10 with movement of left upper extremity.  (Progressing)  LTG #2: Pt will demo improved FOTO score to predicted level. (MET)  LTG #3: Pt will return to prior level of function for all ADL tasks at home. (Progressing)  LTG #4: Pt will demonstrate increased ROM WFL in all elbow and shoulder movements in order increase functional use of upper extremity (Progressing)  LTG #5: Pt will increase shoulder MMT within WFL in order to improve functional use of UE. (Progressing)  LTG #6: Pt will demonstrate increased  strength of 15#s in order to increase functional use of UE. (Progressing)        PLAN     Continue OT 2 times a week x's 8 weeks      Paco Ring OT

## 2023-03-30 NOTE — TELEPHONE ENCOUNTER
Received call from pt's mother (Kassandra), offered appt Friday morning but states she has another appt at 1030 an hour away. States she would like afternoon. Notified Kassandra, provider will be rounding and unsure if schedule will be open. States she will call Friday morning to double check.

## 2023-03-31 ENCOUNTER — OFFICE VISIT (OUTPATIENT)
Dept: FAMILY MEDICINE | Facility: CLINIC | Age: 58
End: 2023-03-31
Payer: MEDICAID

## 2023-03-31 VITALS
WEIGHT: 229.75 LBS | BODY MASS INDEX: 53.17 KG/M2 | DIASTOLIC BLOOD PRESSURE: 80 MMHG | SYSTOLIC BLOOD PRESSURE: 130 MMHG | HEART RATE: 71 BPM | OXYGEN SATURATION: 98 % | RESPIRATION RATE: 17 BRPM | HEIGHT: 55 IN

## 2023-03-31 DIAGNOSIS — I87.2 VENOUS STASIS DERMATITIS OF RIGHT LOWER EXTREMITY: ICD-10-CM

## 2023-03-31 DIAGNOSIS — R60.9 EDEMA, UNSPECIFIED TYPE: Primary | ICD-10-CM

## 2023-03-31 PROCEDURE — 3079F DIAST BP 80-89 MM HG: CPT | Mod: CPTII,,, | Performed by: FAMILY MEDICINE

## 2023-03-31 PROCEDURE — 99215 OFFICE O/P EST HI 40 MIN: CPT | Mod: PBBFAC | Performed by: FAMILY MEDICINE

## 2023-03-31 PROCEDURE — 3008F PR BODY MASS INDEX (BMI) DOCUMENTED: ICD-10-PCS | Mod: CPTII,,, | Performed by: FAMILY MEDICINE

## 2023-03-31 PROCEDURE — 1159F PR MEDICATION LIST DOCUMENTED IN MEDICAL RECORD: ICD-10-PCS | Mod: CPTII,,, | Performed by: FAMILY MEDICINE

## 2023-03-31 PROCEDURE — 3044F PR MOST RECENT HEMOGLOBIN A1C LEVEL <7.0%: ICD-10-PCS | Mod: CPTII,,, | Performed by: FAMILY MEDICINE

## 2023-03-31 PROCEDURE — 99214 PR OFFICE/OUTPT VISIT, EST, LEVL IV, 30-39 MIN: ICD-10-PCS | Mod: S$PBB,,, | Performed by: FAMILY MEDICINE

## 2023-03-31 PROCEDURE — 99999 PR PBB SHADOW E&M-EST. PATIENT-LVL V: CPT | Mod: PBBFAC,,, | Performed by: FAMILY MEDICINE

## 2023-03-31 PROCEDURE — 3075F SYST BP GE 130 - 139MM HG: CPT | Mod: CPTII,,, | Performed by: FAMILY MEDICINE

## 2023-03-31 PROCEDURE — 3079F PR MOST RECENT DIASTOLIC BLOOD PRESSURE 80-89 MM HG: ICD-10-PCS | Mod: CPTII,,, | Performed by: FAMILY MEDICINE

## 2023-03-31 PROCEDURE — 3075F PR MOST RECENT SYSTOLIC BLOOD PRESS GE 130-139MM HG: ICD-10-PCS | Mod: CPTII,,, | Performed by: FAMILY MEDICINE

## 2023-03-31 PROCEDURE — 1159F MED LIST DOCD IN RCRD: CPT | Mod: CPTII,,, | Performed by: FAMILY MEDICINE

## 2023-03-31 PROCEDURE — 3008F BODY MASS INDEX DOCD: CPT | Mod: CPTII,,, | Performed by: FAMILY MEDICINE

## 2023-03-31 PROCEDURE — 99999 PR PBB SHADOW E&M-EST. PATIENT-LVL V: ICD-10-PCS | Mod: PBBFAC,,, | Performed by: FAMILY MEDICINE

## 2023-03-31 PROCEDURE — 99214 OFFICE O/P EST MOD 30 MIN: CPT | Mod: S$PBB,,, | Performed by: FAMILY MEDICINE

## 2023-03-31 PROCEDURE — 3044F HG A1C LEVEL LT 7.0%: CPT | Mod: CPTII,,, | Performed by: FAMILY MEDICINE

## 2023-03-31 RX ORDER — TRIAMCINOLONE ACETONIDE 1 MG/G
CREAM TOPICAL 2 TIMES DAILY
Qty: 45 G | Refills: 1 | Status: SHIPPED | OUTPATIENT
Start: 2023-03-31 | End: 2023-04-14

## 2023-03-31 NOTE — PROGRESS NOTES
Subjective:       Patient ID: Jessi Linton is a 57 y.o. female.    Chief Complaint: redness in legs (bilateral)    Pt is a 57 y.o. female who presents for evaluation and management of   Encounter Diagnoses   Name Primary?    Edema, unspecified type Yes    Venous stasis dermatitis of right lower extremity    .  Doing well on current meds. Denies any side effects. Prevention is up to date.    Review of Systems   Respiratory:  Negative for shortness of breath.    Cardiovascular:  Positive for leg swelling.   Musculoskeletal:         Out of shoulder sling    Skin:  Positive for rash.     Objective:      Physical Exam  Constitutional:       Appearance: She is well-developed. She is obese.   HENT:      Head: Normocephalic and atraumatic.      Right Ear: External ear normal.      Left Ear: External ear normal.      Nose: Nose normal.   Eyes:      Pupils: Pupils are equal, round, and reactive to light.   Neck:      Thyroid: No thyromegaly.      Vascular: No JVD.      Trachea: No tracheal deviation.   Cardiovascular:      Rate and Rhythm: Normal rate.      Heart sounds: Normal heart sounds. No murmur heard.  Pulmonary:      Effort: Pulmonary effort is normal. No respiratory distress.      Breath sounds: Normal breath sounds. No wheezing or rales.   Chest:      Chest wall: No tenderness.   Abdominal:      General: Bowel sounds are normal. There is no distension.      Palpations: Abdomen is soft. There is no mass.      Tenderness: There is no abdominal tenderness. There is no guarding or rebound.   Musculoskeletal:         General: No tenderness. Normal range of motion.      Cervical back: Normal range of motion and neck supple.      Right lower leg: Edema present.      Left lower leg: Edema present.      Comments: 2 plus edema BLE   Venous stasis dermatitis RLE    Lymphadenopathy:      Cervical: No cervical adenopathy.   Skin:     General: Skin is warm and dry.      Coloration: Skin is not pale.      Findings: No  erythema or rash.   Neurological:      Mental Status: She is alert and oriented to person, place, and time.      Cranial Nerves: No cranial nerve deficit.      Motor: No abnormal muscle tone.      Coordination: Coordination normal.      Deep Tendon Reflexes: Reflexes are normal and symmetric. Reflexes normal.   Psychiatric:         Behavior: Behavior normal.         Thought Content: Thought content normal.         Judgment: Judgment normal.       Assessment:       1. Edema, unspecified type    2. Venous stasis dermatitis of right lower extremity          Plan:   1. Edema, unspecified type    2. Venous stasis dermatitis of right lower extremity  -     triamcinolone acetonide 0.1% (KENALOG) 0.1 % cream; Apply topically 2 (two) times daily. for 14 days  Dispense: 45 g; Refill: 1    She can't tolerate compression stockings---too difficult for her to put on   Applied ACE wrap to BLE in clinic. Told her to do this daily at home first thing in the morning when she wakes up.     Lose weight as morbid obesity is the cause for her LE edema  The patient is asked to make an attempt to improve diet and exercise patterns to aid in medical management of this problem.  Cut out white carbs: bread, rice, pasta, potatoes. Exercise/walk 5x/week for at least 30 minutes  .    No follow-ups on file.

## 2023-04-03 ENCOUNTER — CLINICAL SUPPORT (OUTPATIENT)
Dept: REHABILITATION | Facility: HOSPITAL | Age: 58
End: 2023-04-03
Attending: PHYSICIAN ASSISTANT
Payer: MEDICAID

## 2023-04-03 DIAGNOSIS — M79.602 ARM PAIN, ANTERIOR, LEFT: Primary | ICD-10-CM

## 2023-04-03 DIAGNOSIS — R29.898 DECREASED GRIP STRENGTH OF LEFT HAND: ICD-10-CM

## 2023-04-03 DIAGNOSIS — M25.622 DECREASED RANGE OF MOTION OF ELBOW, LEFT: ICD-10-CM

## 2023-04-03 DIAGNOSIS — M25.612 DECREASED RANGE OF MOTION OF LEFT SHOULDER: ICD-10-CM

## 2023-04-03 PROCEDURE — 97530 THERAPEUTIC ACTIVITIES: CPT | Mod: PN

## 2023-04-03 NOTE — PROGRESS NOTES
[facility]  Occupational Therapy Treatment Note    Date: 4/3/2023  Name: Jessi Linton  Clinic Number: [id number]    Therapy Diagnosis:   Encounter Diagnoses   Name Primary?    Arm pain, anterior, left Yes    Decreased range of motion of left shoulder     Decreased range of motion of elbow, left     Decreased  strength of left hand        Physician: Jaycee Adam PA-C      Physician Orders: OT evaluate and treat  Medical Diagnosis: Closed fracture of head of left humerus.  Surgical Procedure and Date: NA  Evaluation Date: 3/1/2023  Insurance Authorization Period Expiration: 3/9/2023 - 12/31/2023  Plan of Care Certification Period: 3/1/2023 - 4/28/2023  Progress Note Due: 3/31/2023   Date of Return to MD: 3/16/2023    # Visits Authorized   7 / 20    Precautions:  Fall and Weightbearing    Time In: 8:45 am  Time Out:  9:30 am  Total Billable Time: 40 minutes    SUBJECTIVE     Pt reports: No complaints.  She was compliant with home exercise program given last session.     Response to previous treatment: No adverse reactions      Pain:    0/10 prior to session    0/10 end of session    Location: left shoulder      OBJECTIVE    Measurements updated at progress report unless specified.    Treatment     Jessi received the treatments listed below:     Supervised modalities after being cleared for contradictions for 5 minutes to include:   - Heat Pack - Applied to left shoulder to increase muscle relaxation, elasticity, and circulation prior to session to facilitate participation in therapy for 5 minutes with concurrent gripping exercises including:  - Red digiflex: 3 x 10  - Red weigthed clip lateral pinch: 3 x 10  - Red weighted clip tripod pinch: 3 x 10    Cold Pack - Applied to left shoulder to reduce post session inflammation and pain following therapy for 5 minutes.    Therapeutic Exercise completed for 35 minutes for increased strength and range of motion including:  - Wall climbs:  2 x 10  - Resisted external rotation and internal rotation with yellow band: 2 x 10 each  - Scapular elevation: 2 x 10  - Scapular retraction: 2 x10 with yellow band and 1# dowel  - Active range of motion shoulder flexion seated: 2 x 10  - Active range of motion shoulder abduction: 2 x 10   - Bicep curl: 2 x 10 with 3# dumbbell  - Triceps extension:2 x 10 with red resistance band  - Supine active assisted range of motion shoulder flexion: 2 x 10 with 1# dowel  - Supine Active assisted range of motion external rotation: 1 x 5 (Pt requested to stop due to mild 3/10 pain in shoulder).    Patient Education and Home Exercises      Education provided:   - written HEP of ST and GH mobility  - Progress towards goals     Written Home Exercises Provided: yes.  Exercises were reviewed and Jessi was able to demonstrate them prior to the end of the session.  Jessi demonstrated fair  understanding of the HEP provided. See EMR under Patient Instructions for exercises provided during therapy sessions.       Assessment     Pt with increased joint mobility and tolerance to session today. Upon entry Pt reported no pain in shoulder or arm. Pt with continued good scapular mobility in all planes tolerating increased resistance in retraction and elevation. She continues to demonstrate pain free active range of motion in shoulder flexion, abduction, and external rotation. She reports that she is currently dressing herself again and slowly working on bathing herself. Mild pain of 3/10 reported during Active assisted range of motion external rotation with Pt requesting to stop exercise after 5 repetitions. Pt reports compliance with HEP. Pt would continue to benefit from skilled OT to address goals and deficits stated below. Jessi is progressing well towards her goals and there are no updates to goals at this time. Pt prognosis is Good. Pt will continue to benefit from skilled outpatient occupational therapy to address the deficits listed in  the problem list on initial evaluation provide pt/family education and to maximize pt's level of independence in the home and community environment.     Pt's spiritual, cultural and educational needs considered and pt agreeable to plan of care and goals.    Anticipated barriers to occupational therapy: guarding of (L) UE      Goals:   The following goals were discussed with the patient and patient is in agreement with them as to be addressed in the treatment plan.      Short Term Goals (STGs); to be met within 4 weeks.  STG #1: Pt will report 4 out of 10 pain level with with movement of left upper extremity. (MET)  STG #2: Pt will demonstrate increased ROM of ~80 degrees shoulder flexion and abduction in order increase functional use of upper extremity (MET)  STG #3: Pt will increase elbow MMT to 3+/5 in elbow flexion/extension in order to improve functional use of UE. (MET)  STG #4: Pt will demonstrate independence with issued HEP. (MET)  STG #5: Pt will demonstrate increased  strength of 10 #s in order to increase functional use of UE. (MET)  STG #6 Pt to have shoulder MMT tested when appropriate.(MET)  STG #7: Pt to improve active range of motion external rotation to 20 degrees for improved functional use of left upper extremity. (Ongoing)     Long Term Goals (LTGs); to be met by discharge.  LTG #1: Pt will report a pain level of 2 out of 10 with movement of left upper extremity.  (Progressing)  LTG #2: Pt will demo improved FOTO score to predicted level. (MET)  LTG #3: Pt will return to prior level of function for all ADL tasks at home. (Progressing)  LTG #4: Pt will demonstrate increased ROM WFL in all elbow and shoulder movements in order increase functional use of upper extremity (Progressing)  LTG #5: Pt will increase shoulder MMT within WFL in order to improve functional use of UE. (Progressing)  LTG #6: Pt will demonstrate increased  strength of 15#s in order to increase functional use of UE.  (Progressing)        PLAN     Continue OT 2 times a week x's 8 weeks      Paco Ring OT

## 2023-04-05 ENCOUNTER — CLINICAL SUPPORT (OUTPATIENT)
Dept: REHABILITATION | Facility: HOSPITAL | Age: 58
End: 2023-04-05
Attending: PHYSICIAN ASSISTANT
Payer: MEDICAID

## 2023-04-05 ENCOUNTER — TELEPHONE (OUTPATIENT)
Dept: ORTHOPEDICS | Facility: CLINIC | Age: 58
End: 2023-04-05
Payer: MEDICAID

## 2023-04-05 DIAGNOSIS — M79.602 ARM PAIN, ANTERIOR, LEFT: Primary | ICD-10-CM

## 2023-04-05 DIAGNOSIS — M25.532 LEFT WRIST PAIN: Primary | ICD-10-CM

## 2023-04-05 DIAGNOSIS — M25.622 DECREASED RANGE OF MOTION OF ELBOW, LEFT: ICD-10-CM

## 2023-04-05 DIAGNOSIS — R29.898 DECREASED GRIP STRENGTH OF LEFT HAND: ICD-10-CM

## 2023-04-05 DIAGNOSIS — M25.612 DECREASED RANGE OF MOTION OF LEFT SHOULDER: ICD-10-CM

## 2023-04-05 PROCEDURE — 97530 THERAPEUTIC ACTIVITIES: CPT | Mod: PN

## 2023-04-05 NOTE — TELEPHONE ENCOUNTER
----- Message from Nayeli Greenfield sent at 2023 10:24 AM CDT -----  Jessi Linton  MRN: [id number]  : 1965  PCP: David Allred  Home Phone      [phone]  Work Phone      Not on file.  Mobile          [phone]      MESSAGE: can you put an order in for a Left wrist xray she have a shoulder xray and appt for tomorrow.      Phone [phone]

## 2023-04-05 NOTE — PROGRESS NOTES
[facility]  Occupational Therapy Treatment Note    Date: 4/5/2023  Name: Jessi Linton  Clinic Number: [id number]    Therapy Diagnosis:   Encounter Diagnoses   Name Primary?    Arm pain, anterior, left Yes    Decreased range of motion of left shoulder     Decreased range of motion of elbow, left     Decreased  strength of left hand        Physician: Jaycee Adam PA-C      Physician Orders: OT evaluate and treat  Medical Diagnosis: Closed fracture of head of left humerus.  Surgical Procedure and Date: NA  Evaluation Date: 3/1/2023  Insurance Authorization Period Expiration: 3/9/2023 - 12/31/2023  Plan of Care Certification Period: 3/1/2023 - 4/28/2023  Progress Note Due: 3/31/2023   Date of Return to MD: 3/16/2023    # Visits Authorized   8 / 12    Precautions:  Fall and Weightbearing    Time In: 9:00  Time Out:  9:45 am  Total Billable Time: 40 minutes    SUBJECTIVE     Pt reports: No complaints.  She was compliant with home exercise program given last session.     Response to previous treatment: No adverse reactions      Pain:    0/10 prior to session    0/10 end of session    Location: left shoulder      OBJECTIVE    Measurements updated at progress report unless specified.    Treatment     Jessi received the treatments listed below:     Supervised modalities after being cleared for contradictions for 5 minutes to include:   - Heat Pack - Applied to left shoulder to increase muscle relaxation, elasticity, and circulation prior to session to facilitate participation in therapy for 5 minutes with concurrent gripping exercises including:  - Green digiflex: 3 x 10  - Red digiflex individual digit flexion: 1 x 10  - Green weigthed clip lateral pinch: 3 x 10  - Green weighted clip tripod pinch: 3 x 10    Cold Pack - Applied to left shoulder to reduce post session inflammation and pain following therapy for 5 minutes.    Therapeutic Exercise completed for 35 minutes for increased  strength and range of motion including:  - Seated Active assisted range of motion shoulder flexion: 2 x 10  - Seated active range of motion shoulder abduction with !# dumbbells: 2 x 10  - Standing shoulder extension with yellow theraband: 3 x 5  - Resisted external rotation and internal rotation with red band: 2 x 10 each  - Bicep curl: 2 x 12 with red band  - Triceps extension: 2 x 12 with red resistance band  - Scapular elevation: 2 x 12 with 3# dumbbell  - Scapular retraction: 2 x12 with yellow band and 1# dowel  - Wall climbs: 1 x 10    Patient Education and Home Exercises      Education provided:   - written HEP of ST and GH mobility  - Progress towards goals     Written Home Exercises Provided: yes.  Exercises were reviewed and Jessi was able to demonstrate them prior to the end of the session.  Jessi demonstrated fair  understanding of the HEP provided. See EMR under Patient Instructions for exercises provided during therapy sessions.       Assessment     Pt with increased joint mobility and tolerance to session today. Pt with continued good tolerance to all exercises with increased resistance/repetitions this session with no reports of increased pain or difficulty. Pt would continue to benefit from skilled OT to address goals and deficits stated below. Jessi is progressing well towards her goals and there are no updates to goals at this time. Pt prognosis is Good. Pt will continue to benefit from skilled outpatient occupational therapy to address the deficits listed in the problem list on initial evaluation provide pt/family education and to maximize pt's level of independence in the home and community environment.     Pt's spiritual, cultural and educational needs considered and pt agreeable to plan of care and goals.    Anticipated barriers to occupational therapy: guarding of (L) UE      Goals:   The following goals were discussed with the patient and patient is in agreement with them as to be addressed  in the treatment plan.      Short Term Goals (STGs); to be met within 4 weeks.  STG #1: Pt will report 4 out of 10 pain level with with movement of left upper extremity. (MET)  STG #2: Pt will demonstrate increased ROM of ~80 degrees shoulder flexion and abduction in order increase functional use of upper extremity (MET)  STG #3: Pt will increase elbow MMT to 3+/5 in elbow flexion/extension in order to improve functional use of UE. (MET)  STG #4: Pt will demonstrate independence with issued HEP. (MET)  STG #5: Pt will demonstrate increased  strength of 10 #s in order to increase functional use of UE. (MET)  STG #6 Pt to have shoulder MMT tested when appropriate.(MET)  STG #7: Pt to improve active range of motion external rotation to 20 degrees for improved functional use of left upper extremity. (Ongoing)     Long Term Goals (LTGs); to be met by discharge.  LTG #1: Pt will report a pain level of 2 out of 10 with movement of left upper extremity.  (Progressing)  LTG #2: Pt will demo improved FOTO score to predicted level. (MET)  LTG #3: Pt will return to prior level of function for all ADL tasks at home. (Progressing)  LTG #4: Pt will demonstrate increased ROM WFL in all elbow and shoulder movements in order increase functional use of upper extremity (Progressing)  LTG #5: Pt will increase shoulder MMT within WFL in order to improve functional use of UE. (Progressing)  LTG #6: Pt will demonstrate increased  strength of 15#s in order to increase functional use of UE. (Progressing)        PLAN     Continue OT 2 times a week x's 8 weeks      Paco Ring OT

## 2023-04-05 NOTE — TELEPHONE ENCOUNTER
States left wrist was swollen at last visit, discussed having xray done. Needs orders for wrist in addition to shoulder.

## 2023-04-06 ENCOUNTER — OFFICE VISIT (OUTPATIENT)
Dept: ORTHOPEDICS | Facility: CLINIC | Age: 58
End: 2023-04-06
Payer: MEDICAID

## 2023-04-06 ENCOUNTER — HOSPITAL ENCOUNTER (OUTPATIENT)
Dept: RADIOLOGY | Facility: HOSPITAL | Age: 58
Discharge: HOME | End: 2023-04-06
Attending: PHYSICIAN ASSISTANT
Payer: MEDICAID

## 2023-04-06 ENCOUNTER — TELEPHONE (OUTPATIENT)
Dept: ORTHOPEDICS | Facility: CLINIC | Age: 58
End: 2023-04-06
Payer: MEDICAID

## 2023-04-06 VITALS
HEART RATE: 71 BPM | WEIGHT: 233 LBS | SYSTOLIC BLOOD PRESSURE: 124 MMHG | HEIGHT: 55 IN | DIASTOLIC BLOOD PRESSURE: 84 MMHG | OXYGEN SATURATION: 95 % | BODY MASS INDEX: 53.92 KG/M2

## 2023-04-06 DIAGNOSIS — M25.512 LEFT SHOULDER PAIN, UNSPECIFIED CHRONICITY: Primary | ICD-10-CM

## 2023-04-06 DIAGNOSIS — S42.292A CLOSED FRACTURE OF HEAD OF LEFT HUMERUS, INITIAL ENCOUNTER: Primary | ICD-10-CM

## 2023-04-06 DIAGNOSIS — M25.512 LEFT SHOULDER PAIN, UNSPECIFIED CHRONICITY: ICD-10-CM

## 2023-04-06 DIAGNOSIS — M25.532 LEFT WRIST PAIN: ICD-10-CM

## 2023-04-06 PROCEDURE — 99214 OFFICE O/P EST MOD 30 MIN: CPT | Mod: PBBFAC | Performed by: PHYSICIAN ASSISTANT

## 2023-04-06 PROCEDURE — 73110 XR WRIST COMPLETE 3 VIEWS LEFT: ICD-10-PCS | Mod: 26,LT,, | Performed by: RADIOLOGY

## 2023-04-06 PROCEDURE — 3079F DIAST BP 80-89 MM HG: CPT | Mod: CPTII,,, | Performed by: PHYSICIAN ASSISTANT

## 2023-04-06 PROCEDURE — 3044F HG A1C LEVEL LT 7.0%: CPT | Mod: CPTII,,, | Performed by: PHYSICIAN ASSISTANT

## 2023-04-06 PROCEDURE — 1159F MED LIST DOCD IN RCRD: CPT | Mod: CPTII,,, | Performed by: PHYSICIAN ASSISTANT

## 2023-04-06 PROCEDURE — 73030 X-RAY EXAM OF SHOULDER: CPT | Mod: TC,LT

## 2023-04-06 PROCEDURE — 73110 X-RAY EXAM OF WRIST: CPT | Mod: TC,LT

## 2023-04-06 PROCEDURE — 3079F PR MOST RECENT DIASTOLIC BLOOD PRESSURE 80-89 MM HG: ICD-10-PCS | Mod: CPTII,,, | Performed by: PHYSICIAN ASSISTANT

## 2023-04-06 PROCEDURE — 1160F PR REVIEW ALL MEDS BY PRESCRIBER/CLIN PHARMACIST DOCUMENTED: ICD-10-PCS | Mod: CPTII,,, | Performed by: PHYSICIAN ASSISTANT

## 2023-04-06 PROCEDURE — 3044F PR MOST RECENT HEMOGLOBIN A1C LEVEL <7.0%: ICD-10-PCS | Mod: CPTII,,, | Performed by: PHYSICIAN ASSISTANT

## 2023-04-06 PROCEDURE — 73030 XR SHOULDER COMPLETE 2 OR MORE VIEWS LEFT: ICD-10-PCS | Mod: 26,LT,, | Performed by: RADIOLOGY

## 2023-04-06 PROCEDURE — 3008F BODY MASS INDEX DOCD: CPT | Mod: CPTII,,, | Performed by: PHYSICIAN ASSISTANT

## 2023-04-06 PROCEDURE — 1159F PR MEDICATION LIST DOCUMENTED IN MEDICAL RECORD: ICD-10-PCS | Mod: CPTII,,, | Performed by: PHYSICIAN ASSISTANT

## 2023-04-06 PROCEDURE — 3074F PR MOST RECENT SYSTOLIC BLOOD PRESSURE < 130 MM HG: ICD-10-PCS | Mod: CPTII,,, | Performed by: PHYSICIAN ASSISTANT

## 2023-04-06 PROCEDURE — 3008F PR BODY MASS INDEX (BMI) DOCUMENTED: ICD-10-PCS | Mod: CPTII,,, | Performed by: PHYSICIAN ASSISTANT

## 2023-04-06 PROCEDURE — 99214 PR OFFICE/OUTPT VISIT, EST, LEVL IV, 30-39 MIN: ICD-10-PCS | Mod: S$PBB,,, | Performed by: PHYSICIAN ASSISTANT

## 2023-04-06 PROCEDURE — 99214 OFFICE O/P EST MOD 30 MIN: CPT | Mod: S$PBB,,, | Performed by: PHYSICIAN ASSISTANT

## 2023-04-06 PROCEDURE — 1160F RVW MEDS BY RX/DR IN RCRD: CPT | Mod: CPTII,,, | Performed by: PHYSICIAN ASSISTANT

## 2023-04-06 PROCEDURE — 3074F SYST BP LT 130 MM HG: CPT | Mod: CPTII,,, | Performed by: PHYSICIAN ASSISTANT

## 2023-04-06 PROCEDURE — 73110 X-RAY EXAM OF WRIST: CPT | Mod: 26,LT,, | Performed by: RADIOLOGY

## 2023-04-06 PROCEDURE — 99999 PR PBB SHADOW E&M-EST. PATIENT-LVL IV: ICD-10-PCS | Mod: PBBFAC,,, | Performed by: PHYSICIAN ASSISTANT

## 2023-04-06 PROCEDURE — 99999 PR PBB SHADOW E&M-EST. PATIENT-LVL IV: CPT | Mod: PBBFAC,,, | Performed by: PHYSICIAN ASSISTANT

## 2023-04-06 PROCEDURE — 73030 X-RAY EXAM OF SHOULDER: CPT | Mod: 26,LT,, | Performed by: RADIOLOGY

## 2023-04-06 NOTE — PROGRESS NOTES
Subjective:      Patient ID: Jessi Linton is a 57 y.o. female.    Chief Complaint: Follow-up of the Left Shoulder and Swelling of the Left Wrist    Review of patient's allergies indicates:   Allergen Reactions    Tramadol Rash      Pt returns to clinic for follow up of left proximal humerus fx.  She is still completing PT.  No pain.  ROM has been improving per pt's mother.    3/16/23:  Pt returns to clinic for follow up of left proximal humerus fx.  She reports no pain today.  Has started OT.    2/23/23:  Pt returns to clinic for follow up of left shoulder pain/proximal humerus fx.  She reports some improvement of pain    2/1/23:  58 yo RHD F presents to clinic with parents with c/o left shoulder pain.  She tripped and fell at home a few days ago, landed on her left shooulder.  Was seen in ED after injury and diagnosed with proximal humerus fx.  Has been wearing sling since.  Mom noticed bruising to pt's upper arm.  She has tried taking pain medication prescribed by ED but makes her feel sick so has been taking tylenol arthritis with good relief of pain.  Occasional numbness/tingling to fingers when wearing sling.      Review of Systems   Constitutional: Negative for chills, diaphoresis and fever.   HENT:  Negative for congestion, ear discharge and ear pain.    Eyes:  Negative for blurred vision, discharge, double vision and pain.   Cardiovascular:  Negative for chest pain, claudication and cyanosis.   Respiratory:  Negative for cough, hemoptysis and shortness of breath.    Endocrine: Negative for cold intolerance and heat intolerance.   Skin:  Negative for color change, dry skin, itching and rash.   Musculoskeletal:  Positive for falls and joint pain. Negative for arthritis, back pain, gout, joint swelling, muscle weakness and neck pain.   Gastrointestinal:  Negative for abdominal pain and change in bowel habit.   Neurological:  Negative for brief paralysis, disturbances in coordination and dizziness.    Psychiatric/Behavioral:  Negative for altered mental status and depression.        Objective:          General    Constitutional: She is oriented to person, place, and time. She appears well-developed and well-nourished. No distress.   HENT:   Head: Atraumatic.   Eyes: EOM are normal. Right eye exhibits no discharge. Left eye exhibits no discharge.   Cardiovascular:  Normal rate.            Pulmonary/Chest: Effort normal. No respiratory distress.   Abdominal: Soft.   Neurological: She is alert and oriented to person, place, and time.   Psychiatric: She has a normal mood and affect. Her behavior is normal.         Right Shoulder Exam     Inspection/Observation   Swelling: absent  Bruising: absent  Scars: absent  Deformity: absent    Other   Sensation: normal    Left Shoulder Exam     Inspection/Observation   Swelling: absent  Bruising: present (upper arm - improving)  Scars: absent  Deformity: absent    Other   Sensation: normal     Comments:  Tenderness to anterior shoulder    Limited exam due to fx and decreased ROM      Vascular Exam     Right Pulses      Radial:                    2+      Left Pulses      Radial:                    2+      Capillary Refill  Right Hand: normal capillary refill  Left Hand: normal capillary refill                Assessment:         Xray Left Shoulder 4/6/23:  Healing fracture involving the left humeral and neck with minimal callus formation noted.  Alignment is unchanged.    Xray Left Shoulder 3/16/23:  There is a comminuted fracture of the left humeral head and neck, mildly displaced.  Minimal callus formation as compared to the prior study.    Xray Left Shoulder 1/28/23:  Comminuted displaced oblique fracture of the humeral head and neck with mild valgus angulation.      Encounter Diagnosis   Name Primary?    Closed fracture of head of left humerus, initial encounter Yes        Closed fracture of head of left humerus, initial encounter                Plan:         Discussed  diagnosis and treatment plan with pt and her parents. Xrays previously reviewed by Dr. Guerra who recommended treatment in sling and start PT in 3-4 weeks.    Pt's mother would like for her to see Dr. Guerra at next follow up. There are no available appointments with Dr. Guerra so pt will be scheduled to see me on a day that Dr. Guerra is here in case he is needed.    Continue Occupational therapy as scheduled.  Continue tylenol as directed as needed.  Ice compresses as directed.  RTC in 3 weeks for follow up with repeat xrays, sooner if needed.    Patient voices understanding of and agreement with treatment plan. All of the patient's questions were answered and the patient will contact us if she has any questions or concerns in the interim.

## 2023-04-10 ENCOUNTER — CLINICAL SUPPORT (OUTPATIENT)
Dept: REHABILITATION | Facility: HOSPITAL | Age: 58
End: 2023-04-10
Attending: PHYSICIAN ASSISTANT
Payer: MEDICAID

## 2023-04-10 DIAGNOSIS — M25.612 DECREASED RANGE OF MOTION OF LEFT SHOULDER: ICD-10-CM

## 2023-04-10 DIAGNOSIS — M79.602 ARM PAIN, ANTERIOR, LEFT: Primary | ICD-10-CM

## 2023-04-10 DIAGNOSIS — M25.622 DECREASED RANGE OF MOTION OF ELBOW, LEFT: ICD-10-CM

## 2023-04-10 DIAGNOSIS — R29.898 DECREASED GRIP STRENGTH OF LEFT HAND: ICD-10-CM

## 2023-04-10 PROCEDURE — 97530 THERAPEUTIC ACTIVITIES: CPT | Mod: PN

## 2023-04-10 NOTE — PROGRESS NOTES
[facility]  Occupational Therapy Treatment Note    Date: 4/10/2023  Name: Jessi Linton  Clinic Number: [id number]    Therapy Diagnosis:   Encounter Diagnoses   Name Primary?    Arm pain, anterior, left Yes    Decreased range of motion of left shoulder     Decreased range of motion of elbow, left     Decreased  strength of left hand        Physician: Jaycee Adam PA-C      Physician Orders: OT evaluate and treat  Medical Diagnosis: Closed fracture of head of left humerus.  Surgical Procedure and Date: NA  Evaluation Date: 3/1/2023  Insurance Authorization Period Expiration: 3/9/2023 - 12/31/2023  Plan of Care Certification Period: 3/1/2023 - 4/28/2023  Progress Note Due: 3/31/2023   Date of Return to MD: 3/16/2023    # Visits Authorized   9 / 12    Precautions:  Fall and Weightbearing    Time In: 8:45  Time Out:  9:30 am  Total Billable Time: 35 minutes    SUBJECTIVE     Pt reports: No complaints.  She was compliant with home exercise program given last session.     Response to previous treatment: No adverse reactions      Pain:   2/10 during session.    Location: left shoulder      OBJECTIVE    Measurements updated at progress report unless specified.    Treatment     Jessi received the treatments listed below:     Supervised modalities after being cleared for contradictions for 5 minutes to include:   - Heat Pack - Applied to left shoulder to increase muscle relaxation, elasticity, and circulation prior to session to facilitate participation in therapy for 5 minutes with concurrent gripping exercises including:  - Green digiflex: 3 x 10  - Red digiflex individual digit flexion: 1 x 10  - Green weigthed clip lateral pinch: 3 x 10  - Green weighted clip tripod pinch: 3 x 10    Cold Pack - Applied to left shoulder to reduce post session inflammation and pain following therapy for 5 minutes.    Therapeutic Exercise completed for 30 minutes for increased strength and range of  "motion including:  - Passive range of motion supine shoulder flexion and abduction: 2 x 10  - Supine Active assisted range of motion shoulder flexion with 1# dowel: 2 x 10  - Supine active range of motion shoulder abduction: 2 x 10  - Supine resisted external rotation with red band: 2 x 10  - Supine internal rotation with red band: 2 x 10  - UBE 2 minutes forward    Patient Education and Home Exercises      Education provided:   - written HEP of ST and GH mobility  - Progress towards goals     Written Home Exercises Provided: yes.  Exercises were reviewed and Jessi was able to demonstrate them prior to the end of the session.  Jessi demonstrated fair  understanding of the HEP provided. See EMR under Patient Instructions for exercises provided during therapy sessions.       Assessment     Upon entry Pt with no reports of pain at home. Pt consistently questioned about pain with Pt denying any activities at home that cause pain or discomfort. During session Pt noted to make several grimaces during exercises with therapist following up with questions about pain or discomfort with Pt denying any pain stating "I'm just thinking about something." Following internal rotation exercise Pt with audible moan due to "Pulling" pain in left shoulder with Pt admitting to having some pain in right shoulder with pain when laying down on her back. Pt reports continued ability to dress herself with continued difficulty laying on her back and washing her hair at home due to reduce range of motion. Discussed with mother to encourage Pt to perform as much as she is able to with assistance.  Pt would continue to benefit from skilled OT to address goals and deficits stated below. Jessi is progressing well towards her goals and there are no updates to goals at this time. Pt prognosis is Good. Pt will continue to benefit from skilled outpatient occupational therapy to address the deficits listed in the problem list on initial evaluation " provide pt/family education and to maximize pt's level of independence in the home and community environment.     Pt's spiritual, cultural and educational needs considered and pt agreeable to plan of care and goals.    Anticipated barriers to occupational therapy: guarding of (L) UE      Goals:   The following goals were discussed with the patient and patient is in agreement with them as to be addressed in the treatment plan.      Short Term Goals (STGs); to be met within 4 weeks.  STG #1: Pt will report 4 out of 10 pain level with with movement of left upper extremity. (MET)  STG #2: Pt will demonstrate increased ROM of ~80 degrees shoulder flexion and abduction in order increase functional use of upper extremity (MET)  STG #3: Pt will increase elbow MMT to 3+/5 in elbow flexion/extension in order to improve functional use of UE. (MET)  STG #4: Pt will demonstrate independence with issued HEP. (MET)  STG #5: Pt will demonstrate increased  strength of 10 #s in order to increase functional use of UE. (MET)  STG #6 Pt to have shoulder MMT tested when appropriate.(MET)  STG #7: Pt to improve active range of motion external rotation to 20 degrees for improved functional use of left upper extremity. (Ongoing)     Long Term Goals (LTGs); to be met by discharge.  LTG #1: Pt will report a pain level of 2 out of 10 with movement of left upper extremity.  (Progressing)  LTG #2: Pt will demo improved FOTO score to predicted level. (MET)  LTG #3: Pt will return to prior level of function for all ADL tasks at home. (Progressing)  LTG #4: Pt will demonstrate increased ROM WFL in all elbow and shoulder movements in order increase functional use of upper extremity (Progressing)  LTG #5: Pt will increase shoulder MMT within WFL in order to improve functional use of UE. (Progressing)  LTG #6: Pt will demonstrate increased  strength of 15#s in order to increase functional use of UE. (Progressing)        PLAN     Continue OT 2  times a week x's 8 weeks      Paco Ring, ABBY

## 2023-04-12 ENCOUNTER — CLINICAL SUPPORT (OUTPATIENT)
Dept: REHABILITATION | Facility: HOSPITAL | Age: 58
End: 2023-04-12
Payer: MEDICAID

## 2023-04-12 DIAGNOSIS — R29.898 DECREASED GRIP STRENGTH OF LEFT HAND: ICD-10-CM

## 2023-04-12 DIAGNOSIS — M25.612 DECREASED RANGE OF MOTION OF LEFT SHOULDER: ICD-10-CM

## 2023-04-12 DIAGNOSIS — M25.622 DECREASED RANGE OF MOTION OF ELBOW, LEFT: ICD-10-CM

## 2023-04-12 DIAGNOSIS — M79.602 ARM PAIN, ANTERIOR, LEFT: Primary | ICD-10-CM

## 2023-04-12 PROCEDURE — 97530 THERAPEUTIC ACTIVITIES: CPT | Mod: PN

## 2023-04-12 NOTE — PROGRESS NOTES
[facility]  Occupational Therapy Treatment Note    Date: 4/12/2023  Name: Jessi Linton  Clinic Number: [id number]    Therapy Diagnosis:   Encounter Diagnoses   Name Primary?    Arm pain, anterior, left Yes    Decreased range of motion of left shoulder     Decreased range of motion of elbow, left     Decreased  strength of left hand        Physician: Jaycee Adam PA-C      Physician Orders: OT evaluate and treat  Medical Diagnosis: Closed fracture of head of left humerus.  Surgical Procedure and Date: NA  Evaluation Date: 3/1/2023  Insurance Authorization Period Expiration: 3/9/2023 - 12/31/2023  Plan of Care Certification Period: 3/1/2023 - 4/28/2023  Progress Note Due: 3/31/2023   Date of Return to MD: 3/16/2023    # Visits Authorized   10 / 12    Precautions:  Fall and Weightbearing    Time In: 9:00  Time Out:  9:45 am  Total Billable Time: 45 minutes    SUBJECTIVE     Pt reports: No complaints.  She was compliant with home exercise program given last session.     Response to previous treatment: No adverse reactions      Pain:   2/10 during session.    Location: left shoulder      OBJECTIVE    Measurements updated at progress report unless specified.    Treatment     Jessi received the treatments listed below:     Supervised modalities after being cleared for contradictions for 15 minutes to include:   - Heat Pack - Applied to left shoulder to increase muscle relaxation, elasticity, and circulation prior to session to facilitate participation in therapy for 10 minutes with concurrent gripping exercises including:  - Bicep curl: 2 x 10 with 3# dumbbell  - Triceps extension: 2 x 10   - Green digiflex: 3 x 10  - Red digiflex individual digit flexion: 1 x 10  - Blue weighted clip tripod and lateral pinch: 2 x 10 each    Cold Pack - Applied to left shoulder to reduce post session inflammation and pain following therapy for 5 minutes.    Therapeutic Exercise completed for 30  minutes for increased strength and range of motion including:  - Functional reach exercise: To place and remove x5 colored cones from shelf above head level for 3 repetitions.   - Seated external rotation with red band: 2 x 12  - Seated internal rotation with red band: 2 x 12  - Supine Active assisted range of motion shoulder flexion with 1# dowel: 2 x 10  - Attempted external rotation with too much pain  - Wall climbs: 2 x 10  - Standing scapular retraction with 2# dowel and red band: 2 x 12  - Standing shoulder elevation with 3# dumbbells: 2 x 15    Patient Education and Home Exercises      Education provided:   - written HEP of ST and GH mobility  - Progress towards goals     Written Home Exercises Provided: yes.  Exercises were reviewed and Jessi was able to demonstrate them prior to the end of the session.  Jessi demonstrated fair  understanding of the HEP provided. See EMR under Patient Instructions for exercises provided during therapy sessions.       Assessment     Upon entry Pt with no reports of pain at home. Upon entry Pt with report of no pain in shoulder currently or throughout her day. When questioned further Pt finally admitted to experiencing some pain at home but not exceeding 2/10. Pt with good tolerance to most exercises this session with Pt expressing pain during active assisted range of motion external rotation rating 5/10, refusing to continue after first repetition. Pt currently demonstrating functional range in shoulder flexion and abduction with continued pain in external rotation movements. She states that she will try to take a shower on her own tonight without assistance from mother with Pt instructed to progress independence as tolerated. Pt would continue to benefit from skilled OT to address goals and deficits stated below. Jessi is progressing well towards her goals and there are no updates to goals at this time. Pt prognosis is Good. Pt will continue to benefit from skilled  outpatient occupational therapy to address the deficits listed in the problem list on initial evaluation provide pt/family education and to maximize pt's level of independence in the home and community environment.     Pt's spiritual, cultural and educational needs considered and pt agreeable to plan of care and goals.    Anticipated barriers to occupational therapy: guarding of (L) UE      Goals:   The following goals were discussed with the patient and patient is in agreement with them as to be addressed in the treatment plan.      Short Term Goals (STGs); to be met within 4 weeks.  STG #1: Pt will report 4 out of 10 pain level with with movement of left upper extremity. (MET)  STG #2: Pt will demonstrate increased ROM of ~80 degrees shoulder flexion and abduction in order increase functional use of upper extremity (MET)  STG #3: Pt will increase elbow MMT to 3+/5 in elbow flexion/extension in order to improve functional use of UE. (MET)  STG #4: Pt will demonstrate independence with issued HEP. (MET)  STG #5: Pt will demonstrate increased  strength of 10 #s in order to increase functional use of UE. (MET)  STG #6 Pt to have shoulder MMT tested when appropriate.(MET)  STG #7: Pt to improve active range of motion external rotation to 20 degrees for improved functional use of left upper extremity. (Ongoing)     Long Term Goals (LTGs); to be met by discharge.  LTG #1: Pt will report a pain level of 2 out of 10 with movement of left upper extremity.  (Progressing)  LTG #2: Pt will demo improved FOTO score to predicted level. (MET)  LTG #3: Pt will return to prior level of function for all ADL tasks at home. (Progressing)  LTG #4: Pt will demonstrate increased ROM WFL in all elbow and shoulder movements in order increase functional use of upper extremity (Progressing)  LTG #5: Pt will increase shoulder MMT within WFL in order to improve functional use of UE. (Progressing)  LTG #6: Pt will demonstrate increased   strength of 15#s in order to increase functional use of UE. (Progressing)        PLAN     Continue OT 2 times a week x's 8 weeks      Paco Ring OT

## 2023-04-17 ENCOUNTER — CLINICAL SUPPORT (OUTPATIENT)
Dept: REHABILITATION | Facility: HOSPITAL | Age: 58
End: 2023-04-17
Payer: MEDICAID

## 2023-04-17 ENCOUNTER — TELEPHONE (OUTPATIENT)
Dept: FAMILY MEDICINE | Facility: CLINIC | Age: 58
End: 2023-04-17
Payer: MEDICAID

## 2023-04-17 DIAGNOSIS — M25.612 DECREASED RANGE OF MOTION OF LEFT SHOULDER: ICD-10-CM

## 2023-04-17 DIAGNOSIS — M79.602 ARM PAIN, ANTERIOR, LEFT: Primary | ICD-10-CM

## 2023-04-17 DIAGNOSIS — R29.898 DECREASED GRIP STRENGTH OF LEFT HAND: ICD-10-CM

## 2023-04-17 DIAGNOSIS — N32.81 OAB (OVERACTIVE BLADDER): ICD-10-CM

## 2023-04-17 DIAGNOSIS — M25.622 DECREASED RANGE OF MOTION OF ELBOW, LEFT: ICD-10-CM

## 2023-04-17 PROCEDURE — 97530 THERAPEUTIC ACTIVITIES: CPT | Mod: PN

## 2023-04-17 RX ORDER — OXYBUTYNIN CHLORIDE 5 MG/1
5 TABLET, EXTENDED RELEASE ORAL DAILY
Qty: 30 TABLET | Refills: 11 | Status: SHIPPED | OUTPATIENT
Start: 2023-04-17 | End: 2023-09-20 | Stop reason: SDUPTHER

## 2023-04-17 NOTE — PROGRESS NOTES
"  [facility]  Occupational Therapy Treatment Note    Date: 4/17/2023  Name: Jessi Linton  Clinic Number: [id number]    Therapy Diagnosis:   Encounter Diagnoses   Name Primary?    Arm pain, anterior, left Yes    Decreased range of motion of left shoulder     Decreased range of motion of elbow, left     Decreased  strength of left hand        Physician: Jaycee Adam PA-C      Physician Orders: OT evaluate and treat  Medical Diagnosis: Closed fracture of head of left humerus.  Surgical Procedure and Date: NA  Evaluation Date: 3/1/2023  Insurance Authorization Period Expiration: 3/9/2023 - 12/31/2023  Plan of Care Certification Period: 3/1/2023 - 4/28/2023  Progress Note Due: 3/31/2023   Date of Return to MD: 3/16/2023    # Visits Authorized   11 / 12    Precautions:  Fall and Weightbearing    Time In: 8:50  Time Out:  9:30 am  Total Billable Time: 35 minutes    SUBJECTIVE     Pt reports: "I washed more of my hair this weekend without mom's help, but she still had to help some."  She was compliant with home exercise program given last session.     Response to previous treatment: No adverse reactions      Pain:  1- 2/10 during session. (Pt presented visual pain scale)    Location: left shoulder      OBJECTIVE    Measurements updated at progress report unless specified.    Treatment     Jessi received the treatments listed below:     Supervised modalities after being cleared for contradictions for 15 minutes to include:   - Heat Pack - Applied to left shoulder to increase muscle relaxation, elasticity, and circulation prior to session to facilitate participation in therapy for 10 minutes with concurrent gripping exercises including:  - Bicep curl: 2 x 12 with 3# dumbbell  - Triceps extension: 2 x 12   - Green digiflex: 3 x 10  - Red digiflex individual digit flexion: 1 x 10  - Blue weighted clip tripod and lateral pinch: 2 x 10 each    Cold Pack - Applied to left shoulder to reduce " post session inflammation and pain following therapy for 5 minutes.    Therapeutic Exercise completed for 25 minutes for increased strength and range of motion including:  - Wall climbs: 1 x 10  - Seated shoulder flexion: 2 x 10 3# dowel  - Functional reach exercise: To place and remove x5 colored cones from shelf above head level for 3 repetitions.   - Seated external rotation with red band: 2 x 15  - Seated internal rotation with red band: 2 x 12  - UBE: 5 minutes forward (Pt did not complete backward motion due to pain with Pt reporting 2/10 pain.)    Left : 23 (Goal met)  Range of motion: external rotation = 45 degrees (Goal met)  MMT: Completed with bilateral upper extremity with left comparable to right.   external rotation/internal rotation  Flexion: -4/5  Abduction: -4/5  Extension: -4/5  Patient Education and Home Exercises      Education provided:   - written HEP of ST and GH mobility  - Progress towards goals     Written Home Exercises Provided: yes.  Exercises were reviewed and Jessi was able to demonstrate them prior to the end of the session.  Jessi demonstrated fair  understanding of the HEP provided. See EMR under Patient Instructions for exercises provided during therapy sessions.       Assessment     Upon entry Pt with no reports of pain at home. Upon entry Pt reports she has been increasing participation in daily tasks including washing hair and preparing food. She states that pain does not exceed 2/10 throughout her day with functional use at this time. Pain reported during backward rotation using UBE with Pt not wanting to continue. Pt insisted pain did not exceed 2/10 with visual pain scale provided to ensure understanding of pain rating. Pt would continue to benefit from skilled OT to address goals and deficits stated below. Jessi is progressing well towards her goals and there are no updates to goals at this time. Pt prognosis is Good. Pt will continue to benefit from skilled outpatient  occupational therapy to address the deficits listed in the problem list on initial evaluation provide pt/family education and to maximize pt's level of independence in the home and community environment.     Pt's spiritual, cultural and educational needs considered and pt agreeable to plan of care and goals.    Anticipated barriers to occupational therapy: guarding of (L) UE      Goals:   The following goals were discussed with the patient and patient is in agreement with them as to be addressed in the treatment plan.      Short Term Goals (STGs); to be met within 4 weeks.  STG #1: Pt will report 4 out of 10 pain level with with movement of left upper extremity. (MET)  STG #2: Pt will demonstrate increased ROM of ~80 degrees shoulder flexion and abduction in order increase functional use of upper extremity (MET)  STG #3: Pt will increase elbow MMT to 3+/5 in elbow flexion/extension in order to improve functional use of UE. (MET)  STG #4: Pt will demonstrate independence with issued HEP. (MET)  STG #5: Pt will demonstrate increased  strength of 10 #s in order to increase functional use of UE. (MET)  STG #6 Pt to have shoulder MMT tested when appropriate.(MET)  STG #7: Pt to improve active range of motion external rotation to 20 degrees for improved functional use of left upper extremity. (MET)     Long Term Goals (LTGs); to be met by discharge.   LTG #1: Pt will report a pain level of 2 out of 10 with movement of left upper extremity.  (MET)  LTG #2: Pt will demo improved FOTO score to predicted level. (MET)  LTG #3: Pt will return to prior level of function for all ADL tasks at home. (Progressing)  LTG #4: Pt will demonstrate increased ROM WFL in all elbow and shoulder movements in order increase functional use of upper extremity (Progressing)  LTG #5: Pt will increase shoulder MMT within WFL in order to improve functional use of UE. (MET)  LTG #6: Pt will demonstrate increased  strength of 15#s in order to  increase functional use of UE. (MET)        PLAN     Continue OT 2 times a week x's 8 weeks      Paco Ring OT

## 2023-04-17 NOTE — TELEPHONE ENCOUNTER
LOV: 03/31/23    Med refill request: Pt requesting for Oxybutyin 15 mg to be decreased to 5 mg. Med not active.      Please advise. Thanks

## 2023-04-17 NOTE — TELEPHONE ENCOUNTER
----- Message from Ashley Ochoa sent at 2023  8:24 AM CDT -----  Contact: self  Jessi Linton  MRN: [id number]  : 1965  PCP: David Allred  Home Phone      [phone]  Work Phone      Not on file.  Mobile          [phone]      MESSAGE:   Pt requesting refill or new Rx.   Is this a refill or new RX:  refill  RX name and strength:   oxybutynin (DITROPAN-XL) 5 MG TR24   Last office visit:   3/31/2023  Is this a 30-day or 90-day RX:  30  Pharm[facility] name and location:  [facility]  Comments:     PT CALLED AND REQUESTED THE 5 MG DOSE    Phone:   [phone]

## 2023-04-24 ENCOUNTER — CLINICAL SUPPORT (OUTPATIENT)
Dept: REHABILITATION | Facility: HOSPITAL | Age: 58
End: 2023-04-24
Payer: MEDICAID

## 2023-04-24 DIAGNOSIS — M25.612 DECREASED RANGE OF MOTION OF LEFT SHOULDER: ICD-10-CM

## 2023-04-24 DIAGNOSIS — R29.898 DECREASED GRIP STRENGTH OF LEFT HAND: ICD-10-CM

## 2023-04-24 DIAGNOSIS — M25.622 DECREASED RANGE OF MOTION OF ELBOW, LEFT: ICD-10-CM

## 2023-04-24 DIAGNOSIS — M79.602 ARM PAIN, ANTERIOR, LEFT: Primary | ICD-10-CM

## 2023-04-24 PROCEDURE — 97530 THERAPEUTIC ACTIVITIES: CPT | Mod: PN

## 2023-04-24 NOTE — PROGRESS NOTES
[facility]  Occupational Therapy Treatment Note/POC Update    Date: 4/24/2023  Name: Jessi Linton  Clinic Number: [id number]    Therapy Diagnosis:   Encounter Diagnoses   Name Primary?    Arm pain, anterior, left Yes    Decreased range of motion of left shoulder     Decreased range of motion of elbow, left     Decreased  strength of left hand      Physician: Jaycee Adam PA-C      Physician Orders: OT evaluate and treat  Medical Diagnosis: Closed fracture of head of left humerus.  Surgical Procedure and Date: NA  Evaluation Date: 3/1/2023  Insurance Authorization Period Expiration: 3/9/2023 - 12/31/2023  Plan of Care Certification Period: 4/24/2023 - 6/2/2023  Progress Note Due: 5/15/2023   Date of Return to MD: 4/26/2023    # Visits Authorized   12 / 12    Precautions:  Fall and Weightbearing    Time In: 11:00  Time Out:  11:45 am  Total Billable Time: 45 minutes    SUBJECTIVE     Pt reports: No new complaints  She was compliant with home exercise program given last session.     Response to previous treatment: No adverse reactions      Pain:  1- 2/10 during session. (Pt presented visual pain scale)    Location: left shoulder      OBJECTIVE    Measurements updated at progress report unless specified.    Treatment     Jessi received the treatments listed below:     Supervised modalities after being cleared for contradictions for 5 minutes to include:   - Heat Pack - Applied to left shoulder to increase muscle relaxation, elasticity, and circulation prior to session to facilitate participation in therapy for 5 minutes with concurrent gripping exercises including:   - Green digiflex: 3 x 10  - Red digiflex individual digit flexion: 2 x 10  - Blue tripod pinch: 3 x 10      Therapeutic Exercise completed for 40 minutes for increased strength and range of motion including:    3/29/2023 > 4/24/2023  Active Range of Motion:  (Measured in degrees)    Left   Shld Flexion 90 > 115   Shld  Extension 30 > 30   Shld Abduction  85 > 110   Shld Adduction 0 > 0   Shld Ext Rotation 10 > 45   Shld Int Rotation 35 > 35         Manual Muscle Test within available range of motion     Left   Shld Flexion -4/5 > +4/5   Shld Extension -4/5 >4+/5   Shld Abduction  -4/5 > 4/5   Shld Adduction -4/5 > -4/5   Shld Ext Rotation -4/5 > -4/5   Shld Int Rotation -4/5 > -4/5   Elbow Flexion  -4/5 > -4/5   Elbow Extension  -4/5 > -4/5   Elbow Supination -4/5 > -4/5   Elbow Pronation -4/5 > -4/5    *All comparable to right upper extremity.      Strength (Dynamometer/Measured in pounds on Rung II) and Pinch Strength (Pinch Gauge)     3/29/2023 > 4/24/2023  3/29/2023 > 4/24/2023     Right Left   Composite 30 > 30  20 > 20   Lateral Pinch 13 > 14 10 >10   Tripod Pinch 13 > 14       8 > 8         Opposition (Fine Motor Skills/Dexterity): WNL          Wound/Scar management: Reduced bruising with bruising at bicep.     Sensation: Pt reports no numbness or loss of sensation at this time.      Edema: None     Updated FOTO score: See media section.    - UBE: 5 minutes forward   - Wall climbs: 1 x 10  - Functional reaching exercise to place and remove 1# weighted cones (x2) from shelf above head height for 3 repetitions.    Patient Education and Home Exercises      Education provided:   - written HEP of ST and GH mobility  - Progress towards goals     Written Home Exercises Provided: yes.  Exercises were reviewed and Jessi was able to demonstrate them prior to the end of the session.  Jessi demonstrated fair  understanding of the HEP provided. See EMR under Patient Instructions for exercises provided during therapy sessions.       Assessment     Upon entry Pt with no reports of pain at home. Upon entry Pt reports continued increased participation in daily activities. Upon discussion with mother Pt is participating in all food prep dishes, and housework with continued difficulty with some dressing tasks and bathing due to continued  limitations in internal rotation and external rotation of left shoulder. She also continues to sleep on a recliner due to pain when laying on her back in bed. Per mother, she recently attempted to sleep in her bed but experienced some pain and returned to recliner. Good tolerance noted to exercises this session with pain remaining at 1-2/10 per Pt with noted improvement in shoulder range of motion. Shoulder strength with small improvements overall but all strength measurements are comparable to right side. She is reporting increased functional use of left upper extremity with improved FOTO score to 63 with expected outcome being 50 points. Pt currently meeting 11/13 goals due to some continued limitations in daily tasks at home. Pt would continue to benefit from skilled OT to address goals and deficits stated below. Jessi is progressing well towards her goals and there are no updates to goals at this time. Pt prognosis is Good. Pt will continue to benefit from skilled outpatient occupational therapy to address the deficits listed in the problem list on initial evaluation provide pt/family education and to maximize pt's level of independence in the home and community environment.     Pt's spiritual, cultural and educational needs considered and pt agreeable to plan of care and goals.    Anticipated barriers to occupational therapy: guarding of (L) UE      Goals:   The following goals were discussed with the patient and patient is in agreement with them as to be addressed in the treatment plan.      Short Term Goals (STGs); to be met within 4 weeks.  STG #1: Pt will report 4 out of 10 pain level with with movement of left upper extremity. (MET)  STG #2: Pt will demonstrate increased ROM of ~80 degrees shoulder flexion and abduction in order increase functional use of upper extremity (MET)  STG #3: Pt will increase elbow MMT to 3+/5 in elbow flexion/extension in order to improve functional use of UE. (MET)  STG #4: Pt  will demonstrate independence with issued HEP. (MET)  STG #5: Pt will demonstrate increased  strength of 10 #s in order to increase functional use of UE. (MET)  STG #6 Pt to have shoulder MMT tested when appropriate.(MET)  STG #7: Pt to improve active range of motion external rotation to 20 degrees for improved functional use of left upper extremity. (MET)     Long Term Goals (LTGs); to be met by discharge.   LTG #1: Pt will report a pain level of 2 out of 10 with movement of left upper extremity.  (MET)  LTG #2: Pt will demo improved FOTO score to predicted level. (MET)  LTG #3: Pt will return to prior level of function for all ADL tasks at home. (Progressing)  LTG #4: Pt will demonstrate increased ROM WFL in all elbow and shoulder movements in order increase functional use of upper extremity (Progressing)  LTG #5: Pt will increase shoulder MMT within WFL in order to improve functional use of UE. (MET)  LTG #6: Pt will demonstrate increased  strength of 15#s in order to increase functional use of UE. (MET)    PLAN     Continue OT 1 time a week x 5 weeks      Paco Ring OT

## 2023-04-25 DIAGNOSIS — R60.9 EDEMA, UNSPECIFIED TYPE: ICD-10-CM

## 2023-04-25 RX ORDER — FUROSEMIDE 20 MG/1
20 TABLET ORAL DAILY
Qty: 90 TABLET | Refills: 3 | Status: SHIPPED | OUTPATIENT
Start: 2023-04-25 | End: 2023-11-13 | Stop reason: SDUPTHER

## 2023-04-25 NOTE — TELEPHONE ENCOUNTER
----- Message from Ephraim Cowart sent at 2023 10:32 AM CDT -----  Contact: Patient  Jessi Linton  MRN: [id number]  : 1965  PCP: David Allred  Home Phone      [phone]  Work Phone      Not on file.  Mobile          [phone]      MESSAGE:   Pt requesting refill or new Rx.   Is this a refill or new RX:  refill  RX name and strength: Furosemide 20 mg   Last office visit: 23  Is this a 30-day or 90-day RX:  90 day  [facility] [place]  Comments:      Phone:  [phone]    PCP: Brigida

## 2023-04-25 NOTE — PLAN OF CARE
[facility]  Occupational Therapy Treatment Note/POC Update    Date: 4/24/2023  Name: Jessi Linton  Clinic Number: [id number]    Therapy Diagnosis:   Encounter Diagnoses   Name Primary?    Arm pain, anterior, left Yes    Decreased range of motion of left shoulder     Decreased range of motion of elbow, left     Decreased  strength of left hand      Physician: Jaycee Adam PA-C      Physician Orders: OT evaluate and treat  Medical Diagnosis: Closed fracture of head of left humerus.  Surgical Procedure and Date: NA  Evaluation Date: 3/1/2023  Insurance Authorization Period Expiration: 3/9/2023 - 12/31/2023  Plan of Care Certification Period: 4/24/2023 - 6/2/2023  Progress Note Due: 5/15/2023   Date of Return to MD: 4/26/2023    # Visits Authorized   12 / 12    Precautions:  Fall and Weightbearing    Time In: 11:00  Time Out:  11:45 am  Total Billable Time: 45 minutes    SUBJECTIVE     Pt reports: No new complaints  She was compliant with home exercise program given last session.     Response to previous treatment: No adverse reactions      Pain:  1- 2/10 during session. (Pt presented visual pain scale)    Location: left shoulder      OBJECTIVE    Measurements updated at progress report unless specified.    Treatment     Jessi received the treatments listed below:     Supervised modalities after being cleared for contradictions for 5 minutes to include:   - Heat Pack - Applied to left shoulder to increase muscle relaxation, elasticity, and circulation prior to session to facilitate participation in therapy for 5 minutes with concurrent gripping exercises including:   - Green digiflex: 3 x 10  - Red digiflex individual digit flexion: 2 x 10  - Blue tripod pinch: 3 x 10      Therapeutic Exercise completed for 40 minutes for increased strength and range of motion including:    3/29/2023 > 4/24/2023  Active Range of Motion:  (Measured in degrees)    Left   Shld Flexion 90 > 115   Shld  Extension 30 > 30   Shld Abduction  85 > 110   Shld Adduction 0 > 0   Shld Ext Rotation 10 > 45   Shld Int Rotation 35 > 35         Manual Muscle Test within available range of motion     Left   Shld Flexion -4/5 > +4/5   Shld Extension -4/5 >4+/5   Shld Abduction  -4/5 > 4/5   Shld Adduction -4/5 > -4/5   Shld Ext Rotation -4/5 > -4/5   Shld Int Rotation -4/5 > -4/5   Elbow Flexion  -4/5 > -4/5   Elbow Extension  -4/5 > -4/5   Elbow Supination -4/5 > -4/5   Elbow Pronation -4/5 > -4/5    *All comparable to right upper extremity.      Strength (Dynamometer/Measured in pounds on Rung II) and Pinch Strength (Pinch Gauge)     3/29/2023 > 4/24/2023  3/29/2023 > 4/24/2023     Right Left   Composite 30 > 30  20 > 20   Lateral Pinch 13 > 14 10 >10   Tripod Pinch 13 > 14       8 > 8         Opposition (Fine Motor Skills/Dexterity): WNL          Wound/Scar management: Reduced bruising with bruising at bicep.     Sensation: Pt reports no numbness or loss of sensation at this time.      Edema: None     Updated FOTO score: See media section.    - UBE: 5 minutes forward   - Wall climbs: 1 x 10  - Functional reaching exercise to place and remove 1# weighted cones (x2) from shelf above head height for 3 repetitions.    Patient Education and Home Exercises      Education provided:   - written HEP of ST and GH mobility  - Progress towards goals     Written Home Exercises Provided: yes.  Exercises were reviewed and Jessi was able to demonstrate them prior to the end of the session.  Jessi demonstrated fair  understanding of the HEP provided. See EMR under Patient Instructions for exercises provided during therapy sessions.       Assessment     Upon entry Pt with no reports of pain at home. Upon entry Pt reports continued increased participation in daily activities. Upon discussion with mother Pt is participating in all food prep dishes, and housework with continued difficulty with some dressing tasks and bathing due to continued  limitations in internal rotation and external rotation of left shoulder. She also continues to sleep on a recliner due to pain when laying on her back in bed. Per mother, she recently attempted to sleep in her bed but experienced some pain and returned to recliner. Good tolerance noted to exercises this session with pain remaining at 1-2/10 per Pt with noted improvement in shoulder range of motion. Shoulder strength with small improvements overall but all strength measurements are comparable to right side. She is reporting increased functional use of left upper extremity with improved FOTO score to 63 with expected outcome being 50 points. Pt currently meeting 11/13 goals due to some continued limitations in daily tasks at home. Pt would continue to benefit from skilled OT to address goals and deficits stated below. Jessi is progressing well towards her goals and there are no updates to goals at this time. Pt prognosis is Good. Pt will continue to benefit from skilled outpatient occupational therapy to address the deficits listed in the problem list on initial evaluation provide pt/family education and to maximize pt's level of independence in the home and community environment.     Pt's spiritual, cultural and educational needs considered and pt agreeable to plan of care and goals.    Anticipated barriers to occupational therapy: guarding of (L) UE      Goals:   The following goals were discussed with the patient and patient is in agreement with them as to be addressed in the treatment plan.      Short Term Goals (STGs); to be met within 4 weeks.  STG #1: Pt will report 4 out of 10 pain level with with movement of left upper extremity. (MET)  STG #2: Pt will demonstrate increased ROM of ~80 degrees shoulder flexion and abduction in order increase functional use of upper extremity (MET)  STG #3: Pt will increase elbow MMT to 3+/5 in elbow flexion/extension in order to improve functional use of UE. (MET)  STG #4: Pt  will demonstrate independence with issued HEP. (MET)  STG #5: Pt will demonstrate increased  strength of 10 #s in order to increase functional use of UE. (MET)  STG #6 Pt to have shoulder MMT tested when appropriate.(MET)  STG #7: Pt to improve active range of motion external rotation to 20 degrees for improved functional use of left upper extremity. (MET)     Long Term Goals (LTGs); to be met by discharge.   LTG #1: Pt will report a pain level of 2 out of 10 with movement of left upper extremity.  (MET)  LTG #2: Pt will demo improved FOTO score to predicted level. (MET)  LTG #3: Pt will return to prior level of function for all ADL tasks at home. (Progressing)  LTG #4: Pt will demonstrate increased ROM WFL in all elbow and shoulder movements in order increase functional use of upper extremity (Progressing)  LTG #5: Pt will increase shoulder MMT within WFL in order to improve functional use of UE. (MET)  LTG #6: Pt will demonstrate increased  strength of 15#s in order to increase functional use of UE. (MET)    PLAN     Continue OT 1 time a week x 5 weeks      Paco Ring OT

## 2023-04-25 NOTE — TELEPHONE ENCOUNTER
No new care gaps identified.  [facility] Embedded Care Gaps. Reference number: [id number]. 4/25/2023   8:46:09 AM CDT

## 2023-04-26 ENCOUNTER — OFFICE VISIT (OUTPATIENT)
Dept: ORTHOPEDICS | Facility: CLINIC | Age: 58
End: 2023-04-26
Payer: MEDICAID

## 2023-04-26 ENCOUNTER — TELEPHONE (OUTPATIENT)
Dept: ORTHOPEDICS | Facility: CLINIC | Age: 58
End: 2023-04-26
Payer: MEDICAID

## 2023-04-26 ENCOUNTER — HOSPITAL ENCOUNTER (OUTPATIENT)
Dept: RADIOLOGY | Facility: HOSPITAL | Age: 58
Discharge: HOME | End: 2023-04-26
Attending: PHYSICIAN ASSISTANT
Payer: MEDICAID

## 2023-04-26 VITALS
HEIGHT: 55 IN | OXYGEN SATURATION: 96 % | BODY MASS INDEX: 54.03 KG/M2 | DIASTOLIC BLOOD PRESSURE: 88 MMHG | SYSTOLIC BLOOD PRESSURE: 122 MMHG | HEART RATE: 75 BPM | WEIGHT: 233.44 LBS

## 2023-04-26 DIAGNOSIS — M25.512 LEFT SHOULDER PAIN, UNSPECIFIED CHRONICITY: ICD-10-CM

## 2023-04-26 DIAGNOSIS — S42.292D CLOSED FRACTURE OF HEAD OF LEFT HUMERUS WITH ROUTINE HEALING, SUBSEQUENT ENCOUNTER: Primary | ICD-10-CM

## 2023-04-26 DIAGNOSIS — M25.512 LEFT SHOULDER PAIN, UNSPECIFIED CHRONICITY: Primary | ICD-10-CM

## 2023-04-26 PROCEDURE — 73030 X-RAY EXAM OF SHOULDER: CPT | Mod: TC,LT

## 2023-04-26 PROCEDURE — 73030 XR SHOULDER COMPLETE 2 OR MORE VIEWS LEFT: ICD-10-PCS | Mod: 26,LT,, | Performed by: RADIOLOGY

## 2023-04-26 PROCEDURE — 3079F PR MOST RECENT DIASTOLIC BLOOD PRESSURE 80-89 MM HG: ICD-10-PCS | Mod: CPTII,,, | Performed by: PHYSICIAN ASSISTANT

## 2023-04-26 PROCEDURE — 1160F RVW MEDS BY RX/DR IN RCRD: CPT | Mod: CPTII,,, | Performed by: PHYSICIAN ASSISTANT

## 2023-04-26 PROCEDURE — 99213 OFFICE O/P EST LOW 20 MIN: CPT | Mod: S$PBB,,, | Performed by: PHYSICIAN ASSISTANT

## 2023-04-26 PROCEDURE — 3008F BODY MASS INDEX DOCD: CPT | Mod: CPTII,,, | Performed by: PHYSICIAN ASSISTANT

## 2023-04-26 PROCEDURE — 1159F MED LIST DOCD IN RCRD: CPT | Mod: CPTII,,, | Performed by: PHYSICIAN ASSISTANT

## 2023-04-26 PROCEDURE — 1159F PR MEDICATION LIST DOCUMENTED IN MEDICAL RECORD: ICD-10-PCS | Mod: CPTII,,, | Performed by: PHYSICIAN ASSISTANT

## 2023-04-26 PROCEDURE — 99999 PR PBB SHADOW E&M-EST. PATIENT-LVL IV: CPT | Mod: PBBFAC,,, | Performed by: PHYSICIAN ASSISTANT

## 2023-04-26 PROCEDURE — 3044F HG A1C LEVEL LT 7.0%: CPT | Mod: CPTII,,, | Performed by: PHYSICIAN ASSISTANT

## 2023-04-26 PROCEDURE — 3079F DIAST BP 80-89 MM HG: CPT | Mod: CPTII,,, | Performed by: PHYSICIAN ASSISTANT

## 2023-04-26 PROCEDURE — 73030 X-RAY EXAM OF SHOULDER: CPT | Mod: 26,LT,, | Performed by: RADIOLOGY

## 2023-04-26 PROCEDURE — 99213 PR OFFICE/OUTPT VISIT, EST, LEVL III, 20-29 MIN: ICD-10-PCS | Mod: S$PBB,,, | Performed by: PHYSICIAN ASSISTANT

## 2023-04-26 PROCEDURE — 3044F PR MOST RECENT HEMOGLOBIN A1C LEVEL <7.0%: ICD-10-PCS | Mod: CPTII,,, | Performed by: PHYSICIAN ASSISTANT

## 2023-04-26 PROCEDURE — 99999 PR PBB SHADOW E&M-EST. PATIENT-LVL IV: ICD-10-PCS | Mod: PBBFAC,,, | Performed by: PHYSICIAN ASSISTANT

## 2023-04-26 PROCEDURE — 3074F SYST BP LT 130 MM HG: CPT | Mod: CPTII,,, | Performed by: PHYSICIAN ASSISTANT

## 2023-04-26 PROCEDURE — 3074F PR MOST RECENT SYSTOLIC BLOOD PRESSURE < 130 MM HG: ICD-10-PCS | Mod: CPTII,,, | Performed by: PHYSICIAN ASSISTANT

## 2023-04-26 PROCEDURE — 99214 OFFICE O/P EST MOD 30 MIN: CPT | Mod: PBBFAC | Performed by: PHYSICIAN ASSISTANT

## 2023-04-26 PROCEDURE — 1160F PR REVIEW ALL MEDS BY PRESCRIBER/CLIN PHARMACIST DOCUMENTED: ICD-10-PCS | Mod: CPTII,,, | Performed by: PHYSICIAN ASSISTANT

## 2023-04-26 PROCEDURE — 3008F PR BODY MASS INDEX (BMI) DOCUMENTED: ICD-10-PCS | Mod: CPTII,,, | Performed by: PHYSICIAN ASSISTANT

## 2023-04-26 NOTE — PROGRESS NOTES
Subjective:      Patient ID: Jessi Linton is a 57 y.o. female.    Chief Complaint: Follow-up and Pain of the Left Shoulder (Pain with movement)    Review of patient's allergies indicates:   Allergen Reactions    Tramadol Rash      Pt returns to clinic for follow up of left proximal humerus fx.  She is still going to PT.  Reports that she is having trouble reaching overhead.    4/6/23:  Pt returns to clinic for follow up of left proximal humerus fx.  She is still completing PT.  No pain.  ROM has been improving per pt's mother.    3/16/23:  Pt returns to clinic for follow up of left proximal humerus fx.  She reports no pain today.  Has started OT.    2/23/23:  Pt returns to clinic for follow up of left shoulder pain/proximal humerus fx.  She reports some improvement of pain    2/1/23:  58 yo RHD F presents to clinic with parents with c/o left shoulder pain.  She tripped and fell at home a few days ago, landed on her left shooulder.  Was seen in ED after injury and diagnosed with proximal humerus fx.  Has been wearing sling since.  Mom noticed bruising to pt's upper arm.  She has tried taking pain medication prescribed by ED but makes her feel sick so has been taking tylenol arthritis with good relief of pain.  Occasional numbness/tingling to fingers when wearing sling.      Review of Systems   Constitutional: Negative for chills, diaphoresis and fever.   HENT:  Negative for congestion, ear discharge and ear pain.    Eyes:  Negative for blurred vision, discharge, double vision and pain.   Cardiovascular:  Negative for chest pain, claudication and cyanosis.   Respiratory:  Negative for cough, hemoptysis and shortness of breath.    Endocrine: Negative for cold intolerance and heat intolerance.   Skin:  Negative for color change, dry skin, itching and rash.   Musculoskeletal:  Positive for falls and joint pain. Negative for arthritis, back pain, gout, joint swelling, muscle weakness and neck pain.    Gastrointestinal:  Negative for abdominal pain and change in bowel habit.   Neurological:  Negative for brief paralysis, disturbances in coordination and dizziness.   Psychiatric/Behavioral:  Negative for altered mental status and depression.        Objective:          General    Constitutional: She is oriented to person, place, and time. She appears well-developed and well-nourished. No distress.   HENT:   Head: Atraumatic.   Eyes: EOM are normal. Right eye exhibits no discharge. Left eye exhibits no discharge.   Cardiovascular:  Normal rate.            Pulmonary/Chest: Effort normal. No respiratory distress.   Abdominal: Soft.   Neurological: She is alert and oriented to person, place, and time.   Psychiatric: She has a normal mood and affect. Her behavior is normal.         Right Shoulder Exam     Inspection/Observation   Swelling: absent  Bruising: absent  Scars: absent  Deformity: absent    Other   Sensation: normal    Left Shoulder Exam     Inspection/Observation   Swelling: absent  Bruising: present (upper arm - improving)  Scars: absent  Deformity: absent    Other   Sensation: normal     Comments:  Tenderness to anterior shoulder    Limited exam due to fx and decreased ROM      Vascular Exam     Right Pulses      Radial:                    2+      Left Pulses      Radial:                    2+      Capillary Refill  Right Hand: normal capillary refill  Left Hand: normal capillary refill                Assessment:         Xray Left Shoulder 4/26/23:  Healing subacute comminuted displaced fracture of the humeral head and neck.    Xray Left Shoulder 4/6/23:  Healing fracture involving the left humeral and neck with minimal callus formation noted.  Alignment is unchanged.    Xray Left Shoulder 3/16/23:  There is a comminuted fracture of the left humeral head and neck, mildly displaced.  Minimal callus formation as compared to the prior study.    Xray Left Shoulder 1/28/23:  Comminuted displaced oblique  fracture of the humeral head and neck with mild valgus angulation.      Encounter Diagnosis   Name Primary?    Closed fracture of head of left humerus with routine healing, subsequent encounter Yes          Closed fracture of head of left humerus with routine healing, subsequent encounter                  Plan:         Discussed diagnosis and treatment plan with pt and her parents. Xrays previously reviewed by Dr. Guerra who recommended treatment in sling and start PT in 3-4 weeks.    Dr. Guerra in clinic today, reviewed today's repeat xrays, recommends to continue PT at this time.    Continue Occupational therapy as scheduled.  Continue tylenol as directed as needed.  Ice compresses as directed.  RTC in 6 weeks for follow up with repeat xrays, sooner if needed.    Patient voices understanding of and agreement with treatment plan. All of the patient's questions were answered and the patient will contact us if she has any questions or concerns in the interim.

## 2023-05-05 ENCOUNTER — TELEPHONE (OUTPATIENT)
Dept: FAMILY MEDICINE | Facility: CLINIC | Age: 58
End: 2023-05-05
Payer: MEDICAID

## 2023-05-05 DIAGNOSIS — G47.33 OSA (OBSTRUCTIVE SLEEP APNEA): Primary | ICD-10-CM

## 2023-05-05 NOTE — TELEPHONE ENCOUNTER
Pt requesting orders for Cpap filters. States she uses Dream station 2 blue filters. Order pended.     Please advise. Thanks

## 2023-05-05 NOTE — TELEPHONE ENCOUNTER
----- Message from Yulissa Marroquin sent at 2023  9:49 AM CDT -----  Contact: self  Jessi Linton  MRN: [id number]  : 1965  PCP: David Allred  Home Phone      [phone]  Work Phone      Not on file.  Mobile          [phone]      MESSAGE:   Patient requesting new C-pap supplies--states she has the dream station 2 and needs the blue filters    States that she called DME and they requested we sent the request to  them    [phone]

## 2023-05-09 ENCOUNTER — CLINICAL SUPPORT (OUTPATIENT)
Dept: REHABILITATION | Facility: HOSPITAL | Age: 58
End: 2023-05-09
Payer: MEDICAID

## 2023-05-09 DIAGNOSIS — R29.898 DECREASED GRIP STRENGTH OF LEFT HAND: ICD-10-CM

## 2023-05-09 DIAGNOSIS — M25.622 DECREASED RANGE OF MOTION OF ELBOW, LEFT: ICD-10-CM

## 2023-05-09 DIAGNOSIS — M25.612 DECREASED RANGE OF MOTION OF LEFT SHOULDER: ICD-10-CM

## 2023-05-09 DIAGNOSIS — M79.602 ARM PAIN, ANTERIOR, LEFT: Primary | ICD-10-CM

## 2023-05-09 PROCEDURE — 97530 THERAPEUTIC ACTIVITIES: CPT | Mod: PN

## 2023-05-09 NOTE — TELEPHONE ENCOUNTER
Received order from [employer]. Form signed by provider and faxed with filter order placed. Fax confirmation received.

## 2023-05-09 NOTE — PROGRESS NOTES
[facility]  Occupational Therapy Treatment Note    Date: 5/9/2023  Name: Jessi Linton  Clinic Number: [id number]    Therapy Diagnosis:   Encounter Diagnoses   Name Primary?    Arm pain, anterior, left Yes    Decreased range of motion of left shoulder     Decreased range of motion of elbow, left     Decreased  strength of left hand        Physician: Dann Salmeron MD      Physician Orders: OT evaluate and treat  Medical Diagnosis: Closed fracture of head of left humerus.  Surgical Procedure and Date: NA  Evaluation Date: 3/1/2023  Insurance Authorization Period Expiration: 3/9/2023 - 12/31/2023  Plan of Care Certification Period: 4/24/2023 - 6/2/2023  Progress Note Due: 5/15/2023   Date of Return to MD: 4/26/2023    # Visits Authorized   12 / 12    Precautions:  Fall and Weightbearing    Time In: 7:38  Time Out:  8:45 am  Total Billable Time: 32 minutes    SUBJECTIVE     Pt reports: No new complaints  She was compliant with home exercise program given last session.     Response to previous treatment: No adverse reactions      Pain:  1- 2/10 during session. (Pt presented visual pain scale)    Location: left shoulder      OBJECTIVE    Measurements updated at progress report unless specified.    Treatment     Jessi received the treatments listed below:     Supervised modalities after being cleared for contradictions for 5 minutes to include:   - Heat Pack - Applied to left shoulder to increase muscle relaxation, elasticity, and circulation prior to session to facilitate participation in therapy for 5 minutes with concurrent gripping exercises including:   - Green digiflex: 3 x 10  - Red digiflex individual digit flexion: 2 x 10  - Blue tripod pinch: 3 x 10    Cold Pack - Applied to left shoulder to reduce post session inflammation and pain following therapy for 5 minutes    Therapeutic Exercise completed for 27 minutes for increased strength and range of motion including:   - Wall  climbs: 1 x 10   - UBE: 6 minutes forward  - Functional reaching exercise to place and remove 2# weighted cones (x2) from shelf above head height for 10 repetitions.  - Resisted internal rotation and external rotation with red band: 2 x 15 each  - Active assisted range of motion shoulder flexion with 3# dowel: 3 x 12  - Bicep curl: 3 x 10 4# dumbbell    Patient Education and Home Exercises      Education provided:   - written HEP of ST and GH mobility  - Progress towards goals     Written Home Exercises Provided: yes.  Exercises were reviewed and Jessi was able to demonstrate them prior to the end of the session.  Jessi demonstrated fair  understanding of the HEP provided. See EMR under Patient Instructions for exercises provided during therapy sessions.       Assessment     Upon entry Pt with no reports of pain at home. Upon entry Pt reporting increased performance in daily tasks such as self bathing and washing hair and getting dressed with reduced pain. She still reports some difficulty with washing her back and sleeping in her bed due to pain. Minimal pain reported during session today. Pt would continue to benefit from skilled OT to address goals and deficits stated below. Jessi is progressing well towards her goals and there are no updates to goals at this time. Pt prognosis is Good. Pt will continue to benefit from skilled outpatient occupational therapy to address the deficits listed in the problem list on initial evaluation provide pt/family education and to maximize pt's level of independence in the home and community environment.     Pt's spiritual, cultural and educational needs considered and pt agreeable to plan of care and goals.    Anticipated barriers to occupational therapy: guarding of (L) UE      Goals:   The following goals were discussed with the patient and patient is in agreement with them as to be addressed in the treatment plan.      Short Term Goals (STGs); to be met within 4 weeks.  STG  #1: Pt will report 4 out of 10 pain level with with movement of left upper extremity. (MET)  STG #2: Pt will demonstrate increased ROM of ~80 degrees shoulder flexion and abduction in order increase functional use of upper extremity (MET)  STG #3: Pt will increase elbow MMT to 3+/5 in elbow flexion/extension in order to improve functional use of UE. (MET)  STG #4: Pt will demonstrate independence with issued HEP. (MET)  STG #5: Pt will demonstrate increased  strength of 10 #s in order to increase functional use of UE. (MET)  STG #6 Pt to have shoulder MMT tested when appropriate.(MET)  STG #7: Pt to improve active range of motion external rotation to 20 degrees for improved functional use of left upper extremity. (MET)     Long Term Goals (LTGs); to be met by discharge.   LTG #1: Pt will report a pain level of 2 out of 10 with movement of left upper extremity.  (MET)  LTG #2: Pt will demo improved FOTO score to predicted level. (MET)  LTG #3: Pt will return to prior level of function for all ADL tasks at home. (Progressing)  LTG #4: Pt will demonstrate increased ROM WFL in all elbow and shoulder movements in order increase functional use of upper extremity (Progressing)  LTG #5: Pt will increase shoulder MMT within WFL in order to improve functional use of UE. (MET)  LTG #6: Pt will demonstrate increased  strength of 15#s in order to increase functional use of UE. (MET)    PLAN     Continue OT 1 time a week x 5 weeks      Paco Ring OT

## 2023-05-18 ENCOUNTER — CLINICAL SUPPORT (OUTPATIENT)
Dept: REHABILITATION | Facility: HOSPITAL | Age: 58
End: 2023-05-18
Attending: PHYSICIAN ASSISTANT
Payer: MEDICAID

## 2023-05-18 DIAGNOSIS — M25.612 DECREASED RANGE OF MOTION OF LEFT SHOULDER: ICD-10-CM

## 2023-05-18 DIAGNOSIS — R29.898 DECREASED GRIP STRENGTH OF LEFT HAND: ICD-10-CM

## 2023-05-18 DIAGNOSIS — M79.602 ARM PAIN, ANTERIOR, LEFT: Primary | ICD-10-CM

## 2023-05-18 DIAGNOSIS — M25.622 DECREASED RANGE OF MOTION OF ELBOW, LEFT: ICD-10-CM

## 2023-05-18 PROCEDURE — 97530 THERAPEUTIC ACTIVITIES: CPT | Mod: PN

## 2023-05-18 NOTE — PLAN OF CARE
[facility]  Occupational Therapy Treatment Note/Discharge summary    Date: 5/18/2023  Name: Jessi Linton  Clinic Number: [id number]    Therapy Diagnosis:   Encounter Diagnoses   Name Primary?    Arm pain, anterior, left Yes    Decreased range of motion of left shoulder     Decreased range of motion of elbow, left     Decreased  strength of left hand      Physician: Jaycee Adam PA-C      Physician Orders: OT evaluate and treat  Medical Diagnosis: Closed fracture of head of left humerus.  Surgical Procedure and Date: NA  Evaluation Date: 3/1/2023  Insurance Authorization Period Expiration: 3/9/2023 - 12/31/2023  Plan of Care Certification Period: 4/24/2023 - 6/2/2023  Progress Note Due: 5/15/2023   Date of Return to MD: 4/26/2023    # Visits Authorized   12 / 12    Precautions:  Fall and Weightbearing    Time In: 11:45  Time Out:  12:30 am  Total Billable Time: 35 minutes    SUBJECTIVE     Pt reports: No new complaints  She was compliant with home exercise program given last session.     Response to previous treatment: No adverse reactions      Pain:  0/10 during session. (Pt presented visual pain scale)    Location: left shoulder      OBJECTIVE    Measurements updated at progress report unless specified.    Treatment     Jessi received the treatments listed below:     Supervised modalities after being cleared for contradictions for 5 minutes to include:   - Heat Pack - Applied to left shoulder to increase muscle relaxation, elasticity, and circulation prior to session to facilitate participation in therapy for 5 minutes.     Cold Pack - Applied to left shoulder to reduce post session inflammation and pain following therapy for 5 minutes.     Therapeutic Exercise performed to increase strength and range of motion for 35 minutes including:  - 4/24/2023 - 5/18/2023  Active Range of Motion:  (Measured in degrees)    Left   Shld Flexion 115 > 120   Shld Extension 30 > 35   Shld  Abduction  110 > 115   Shld Adduction 0 > 0   Shld Ext Rotation 45 > 45   Shld Int Rotation 35 > 50 seated         Manual Muscle Test within available range of motion     Left   Shld Flexion  +4/5 > 5/5   Shld Extension 4+/5 > 5/5   Shld Abduction 4/5 > 4+/5   Shld Adduction  4/5 > 4+/5   Shld Ext Rotation -4/5 > 4+/5   Shld Int Rotation -4/5 > 4+/5   Elbow Flexion  -4/5 > 5/5   Elbow Extension -4/5 >4+/5   Elbow Supination -4/5 > 4/5   Elbow Pronation -4/5 > 4/5    *All comparable to right upper extremity.      Strength (Dynamometer/Measured in pounds on Rung II) and Pinch Strength (Pinch Gauge)     4/24/2023 > 5/18/2023     Left   Composite  20 > 25   Lateral Pinch 10 >11   Tripod Pinch       8 > 12         Opposition (Fine Motor Skills/Dexterity): WNL          Wound/Scar management: Reduced bruising with bruising at bicep.     Sensation: Pt reports no numbness or loss of sensation at this time.      Edema: None     FOTO score: See media section.     - UBE: 7 minutes forward with no resistance  - Functional reaching activity to place and remove 1# cones x4 from shelf above head height for 3 x 3  - Resisted internal rotation and external rotation: 3 x 10 with red band.   - Active range of motion shoulder flexion and abduction: 3 x 10 no weight  - Resisted scapular elevation: 3 x 10 2# dumbbell  - Resisted scapular retraction: 3 x 10 with red abduction band and 2# dowel  Patient Education and Home Exercises      Education provided:   - written HEP of ST and GH mobility  - Progress towards goals     Written Home Exercises Provided: yes.  Exercises were reviewed and Jessi was able to demonstrate them prior to the end of the session.  Jessi demonstrated fair  understanding of the HEP provided. See EMR under Patient Instructions for exercises provided during therapy sessions.       Assessment     Upon entry with report of currently performing all ADL at home with no pain or difficulty. She demonstrates improved  shoulder range of motion, strength, and  as documented above. Pt currently meeting all goals with discharge discussed with Pt. Pt is in agreement with discharge at this time and is comfortable and confident with progress made. Pt to be discharged from OT services at this time. Pt educated on receiving new referral if she believes the requires further therapy.     Pt's spiritual, cultural and educational needs considered and pt agreeable to plan of care and goals.    Anticipated barriers to occupational therapy: guarding of (L) UE      Goals:   The following goals were discussed with the patient and patient is in agreement with them as to be addressed in the treatment plan.      Short Term Goals (STGs); to be met within 4 weeks.  STG #1: Pt will report 4 out of 10 pain level with with movement of left upper extremity. (MET)  STG #2: Pt will demonstrate increased ROM of ~80 degrees shoulder flexion and abduction in order increase functional use of upper extremity (MET)  STG #3: Pt will increase elbow MMT to 3+/5 in elbow flexion/extension in order to improve functional use of UE. (MET)  STG #4: Pt will demonstrate independence with issued HEP. (MET)  STG #5: Pt will demonstrate increased  strength of 10 #s in order to increase functional use of UE. (MET)  STG #6 Pt to have shoulder MMT tested when appropriate.(MET)  STG #7: Pt to improve active range of motion external rotation to 20 degrees for improved functional use of left upper extremity. (MET)     Long Term Goals (LTGs); to be met by discharge.   LTG #1: Pt will report a pain level of 2 out of 10 with movement of left upper extremity.  (MET)  LTG #2: Pt will demo improved FOTO score to predicted level. (MET)  LTG #3: Pt will return to prior level of function for all ADL tasks at home. (MET)  LTG #4: Pt will demonstrate increased ROM WFL in all elbow and shoulder movements in order increase functional use of upper extremity (MET)  LTG #5: Pt will increase  shoulder MMT within WFL in order to improve functional use of UE. (MET)  LTG #6: Pt will demonstrate increased  strength of 15#s in order to increase functional use of UE. (MET)    PLAN   Pt to be discharged from OT services at this time.       Paco Ring OT

## 2023-05-18 NOTE — PROGRESS NOTES
[facility]  Occupational Therapy Treatment Note/Discharge summary    Date: 5/18/2023  Name: Jessi Linton  Clinic Number: [id number]    Therapy Diagnosis:   Encounter Diagnoses   Name Primary?    Arm pain, anterior, left Yes    Decreased range of motion of left shoulder     Decreased range of motion of elbow, left     Decreased  strength of left hand      Physician: Jaycee Adam PA-C      Physician Orders: OT evaluate and treat  Medical Diagnosis: Closed fracture of head of left humerus.  Surgical Procedure and Date: NA  Evaluation Date: 3/1/2023  Insurance Authorization Period Expiration: 3/9/2023 - 12/31/2023  Plan of Care Certification Period: 4/24/2023 - 6/2/2023  Progress Note Due: 5/15/2023   Date of Return to MD: 4/26/2023    # Visits Authorized   12 / 12    Precautions:  Fall and Weightbearing    Time In: 11:45  Time Out:  12:30 am  Total Billable Time: 35 minutes    SUBJECTIVE     Pt reports: No new complaints  She was compliant with home exercise program given last session.     Response to previous treatment: No adverse reactions      Pain:  0/10 during session. (Pt presented visual pain scale)    Location: left shoulder      OBJECTIVE    Measurements updated at progress report unless specified.    Treatment     Jessi received the treatments listed below:     Supervised modalities after being cleared for contradictions for 5 minutes to include:   - Heat Pack - Applied to left shoulder to increase muscle relaxation, elasticity, and circulation prior to session to facilitate participation in therapy for 5 minutes.     Cold Pack - Applied to left shoulder to reduce post session inflammation and pain following therapy for 5 minutes.    Therapeutic Exercise performed to increase strength and range of motion for 35 minutes including:  - 4/24/2023 - 5/18/2023  Active Range of Motion:  (Measured in degrees)    Left   Shld Flexion 115 > 120   Shld Extension 30 > 35   Shld  Abduction  110 > 115   Shld Adduction 0 > 0   Shld Ext Rotation 45 > 45   Shld Int Rotation 35 > 50 seated         Manual Muscle Test within available range of motion     Left   Shld Flexion  +4/5 > 5/5   Shld Extension 4+/5 > 5/5   Shld Abduction 4/5 > 4+/5   Shld Adduction  4/5 > 4+/5   Shld Ext Rotation -4/5 > 4+/5   Shld Int Rotation -4/5 > 4+/5   Elbow Flexion  -4/5 > 5/5   Elbow Extension -4/5 >4+/5   Elbow Supination -4/5 > 4/5   Elbow Pronation -4/5 > 4/5    *All comparable to right upper extremity.      Strength (Dynamometer/Measured in pounds on Rung II) and Pinch Strength (Pinch Gauge)     4/24/2023 > 5/18/2023     Left   Composite  20 > 25   Lateral Pinch 10 >11   Tripod Pinch       8 > 12         Opposition (Fine Motor Skills/Dexterity): WNL          Wound/Scar management: Reduced bruising with bruising at bicep.     Sensation: Pt reports no numbness or loss of sensation at this time.      Edema: None     FOTO score: See media section.     - UBE: 7 minutes forward with no resistance  - Functional reaching activity to place and remove 1# cones x4 from shelf above head height for 3 x 3  - Resisted internal rotation and external rotation: 3 x 10 with red band.   - Active range of motion shoulder flexion and abduction: 3 x 10 no weight  - Resisted scapular elevation: 3 x 10 2# dumbbell  - Resisted scapular retraction: 3 x 10 with red abduction band and 2# dowel  Patient Education and Home Exercises      Education provided:   - written HEP of ST and GH mobility  - Progress towards goals     Written Home Exercises Provided: yes.  Exercises were reviewed and Jessi was able to demonstrate them prior to the end of the session.  Jessi demonstrated fair  understanding of the HEP provided. See EMR under Patient Instructions for exercises provided during therapy sessions.       Assessment     Upon entry with report of currently performing all ADL at home with no pain or difficulty. She demonstrates improved  shoulder range of motion, strength, and  as documented above. Pt currently meeting all goals with discharge discussed with Pt. Pt is in agreement with discharge at this time and is comfortable and confident with progress made. Pt to be discharged from OT services at this time. Pt educated on receiving new referral if she believes the requires further therapy.     Pt's spiritual, cultural and educational needs considered and pt agreeable to plan of care and goals.    Anticipated barriers to occupational therapy: guarding of (L) UE      Goals:   The following goals were discussed with the patient and patient is in agreement with them as to be addressed in the treatment plan.      Short Term Goals (STGs); to be met within 4 weeks.  STG #1: Pt will report 4 out of 10 pain level with with movement of left upper extremity. (MET)  STG #2: Pt will demonstrate increased ROM of ~80 degrees shoulder flexion and abduction in order increase functional use of upper extremity (MET)  STG #3: Pt will increase elbow MMT to 3+/5 in elbow flexion/extension in order to improve functional use of UE. (MET)  STG #4: Pt will demonstrate independence with issued HEP. (MET)  STG #5: Pt will demonstrate increased  strength of 10 #s in order to increase functional use of UE. (MET)  STG #6 Pt to have shoulder MMT tested when appropriate.(MET)  STG #7: Pt to improve active range of motion external rotation to 20 degrees for improved functional use of left upper extremity. (MET)     Long Term Goals (LTGs); to be met by discharge.   LTG #1: Pt will report a pain level of 2 out of 10 with movement of left upper extremity.  (MET)  LTG #2: Pt will demo improved FOTO score to predicted level. (MET)  LTG #3: Pt will return to prior level of function for all ADL tasks at home. (MET)  LTG #4: Pt will demonstrate increased ROM WFL in all elbow and shoulder movements in order increase functional use of upper extremity (MET)  LTG #5: Pt will increase  shoulder MMT within WFL in order to improve functional use of UE. (MET)  LTG #6: Pt will demonstrate increased  strength of 15#s in order to increase functional use of UE. (MET)    PLAN     Continue OT 1 time a week x 5 weeks      Paco Ring OT

## 2023-05-23 ENCOUNTER — PATIENT OUTREACH (OUTPATIENT)
Dept: ADMINISTRATIVE | Facility: HOSPITAL | Age: 58
End: 2023-05-23
Payer: MEDICAID

## 2023-05-23 DIAGNOSIS — Z12.31 BREAST CANCER SCREENING BY MAMMOGRAM: Primary | ICD-10-CM

## 2023-05-23 NOTE — PROGRESS NOTES
Chart reviewed, immunization record updated.  No new results noted on [facility] or [facility] web site.  Care Everywhere updated.   Patient care coordination note  Upcoming PCP visit updated.  Next PCP visit 7/20/2023.  MMG order placed.  Spoke to patient, declined to schedule MMG since she is having shoulder pain and being followed up with ortho.

## 2023-05-25 DIAGNOSIS — E89.40 SURGICAL MENOPAUSE: ICD-10-CM

## 2023-05-25 NOTE — TELEPHONE ENCOUNTER
Dr Allred , pt is needing a refill on her premarin.      Pt requesting refill or new Rx.   Is this a refill or new RX:  refill  RX name and strength: Premarin  Last office visit: 03/31/23  Is this a 30-day or 90-day RX:  ???  Pharmacy name and location:  [facility]  Comments:      LOV 3/31/23    Med pended

## 2023-05-25 NOTE — TELEPHONE ENCOUNTER
----- Message from Ephraim Cowart sent at 2023  9:36 AM CDT -----  Contact: Patient  Jessi Linton  MRN: [id number]  : 1965  PCP: David Allred  Home Phone      [phone]  Work Phone      Not on file.  Mobile          [phone]      MESSAGE:   Pt requesting refill or new Rx.   Is this a refill or new RX:  refill  RX name and strength: Premarin  Last office visit: 23  Is this a 30-day or 90-day RX:  ???  Pharmacy name and location:  [facility] in [place]  Comments:      Phone:  [phone]    PCP: Brigida

## 2023-06-07 ENCOUNTER — OFFICE VISIT (OUTPATIENT)
Dept: ORTHOPEDICS | Facility: CLINIC | Age: 58
End: 2023-06-07
Payer: MEDICAID

## 2023-06-07 ENCOUNTER — HOSPITAL ENCOUNTER (OUTPATIENT)
Dept: RADIOLOGY | Facility: HOSPITAL | Age: 58
Discharge: HOME | End: 2023-06-07
Attending: PHYSICIAN ASSISTANT
Payer: MEDICAID

## 2023-06-07 VITALS
HEART RATE: 87 BPM | DIASTOLIC BLOOD PRESSURE: 84 MMHG | OXYGEN SATURATION: 97 % | BODY MASS INDEX: 54.66 KG/M2 | WEIGHT: 236.19 LBS | HEIGHT: 55 IN | SYSTOLIC BLOOD PRESSURE: 152 MMHG

## 2023-06-07 DIAGNOSIS — M25.512 LEFT SHOULDER PAIN, UNSPECIFIED CHRONICITY: ICD-10-CM

## 2023-06-07 DIAGNOSIS — S42.292D CLOSED FRACTURE OF HEAD OF LEFT HUMERUS WITH ROUTINE HEALING, SUBSEQUENT ENCOUNTER: Primary | ICD-10-CM

## 2023-06-07 PROCEDURE — 1159F PR MEDICATION LIST DOCUMENTED IN MEDICAL RECORD: ICD-10-PCS | Mod: CPTII,,, | Performed by: PHYSICIAN ASSISTANT

## 2023-06-07 PROCEDURE — 3079F PR MOST RECENT DIASTOLIC BLOOD PRESSURE 80-89 MM HG: ICD-10-PCS | Mod: CPTII,,, | Performed by: PHYSICIAN ASSISTANT

## 2023-06-07 PROCEDURE — 1160F PR REVIEW ALL MEDS BY PRESCRIBER/CLIN PHARMACIST DOCUMENTED: ICD-10-PCS | Mod: CPTII,,, | Performed by: PHYSICIAN ASSISTANT

## 2023-06-07 PROCEDURE — 1160F RVW MEDS BY RX/DR IN RCRD: CPT | Mod: CPTII,,, | Performed by: PHYSICIAN ASSISTANT

## 2023-06-07 PROCEDURE — 1159F MED LIST DOCD IN RCRD: CPT | Mod: CPTII,,, | Performed by: PHYSICIAN ASSISTANT

## 2023-06-07 PROCEDURE — 3079F DIAST BP 80-89 MM HG: CPT | Mod: CPTII,,, | Performed by: PHYSICIAN ASSISTANT

## 2023-06-07 PROCEDURE — 73030 X-RAY EXAM OF SHOULDER: CPT | Mod: 26,LT,, | Performed by: RADIOLOGY

## 2023-06-07 PROCEDURE — 99214 PR OFFICE/OUTPT VISIT, EST, LEVL IV, 30-39 MIN: ICD-10-PCS | Mod: S$PBB,,, | Performed by: PHYSICIAN ASSISTANT

## 2023-06-07 PROCEDURE — 99214 OFFICE O/P EST MOD 30 MIN: CPT | Mod: PBBFAC | Performed by: PHYSICIAN ASSISTANT

## 2023-06-07 PROCEDURE — 99999 PR PBB SHADOW E&M-EST. PATIENT-LVL IV: CPT | Mod: PBBFAC,,, | Performed by: PHYSICIAN ASSISTANT

## 2023-06-07 PROCEDURE — 3008F PR BODY MASS INDEX (BMI) DOCUMENTED: ICD-10-PCS | Mod: CPTII,,, | Performed by: PHYSICIAN ASSISTANT

## 2023-06-07 PROCEDURE — 3044F PR MOST RECENT HEMOGLOBIN A1C LEVEL <7.0%: ICD-10-PCS | Mod: CPTII,,, | Performed by: PHYSICIAN ASSISTANT

## 2023-06-07 PROCEDURE — 3008F BODY MASS INDEX DOCD: CPT | Mod: CPTII,,, | Performed by: PHYSICIAN ASSISTANT

## 2023-06-07 PROCEDURE — 3044F HG A1C LEVEL LT 7.0%: CPT | Mod: CPTII,,, | Performed by: PHYSICIAN ASSISTANT

## 2023-06-07 PROCEDURE — 99999 PR PBB SHADOW E&M-EST. PATIENT-LVL IV: ICD-10-PCS | Mod: PBBFAC,,, | Performed by: PHYSICIAN ASSISTANT

## 2023-06-07 PROCEDURE — 99214 OFFICE O/P EST MOD 30 MIN: CPT | Mod: S$PBB,,, | Performed by: PHYSICIAN ASSISTANT

## 2023-06-07 PROCEDURE — 3077F PR MOST RECENT SYSTOLIC BLOOD PRESSURE >= 140 MM HG: ICD-10-PCS | Mod: CPTII,,, | Performed by: PHYSICIAN ASSISTANT

## 2023-06-07 PROCEDURE — 73030 X-RAY EXAM OF SHOULDER: CPT | Mod: TC,LT

## 2023-06-07 PROCEDURE — 73030 XR SHOULDER COMPLETE 2 OR MORE VIEWS LEFT: ICD-10-PCS | Mod: 26,LT,, | Performed by: RADIOLOGY

## 2023-06-07 PROCEDURE — 3077F SYST BP >= 140 MM HG: CPT | Mod: CPTII,,, | Performed by: PHYSICIAN ASSISTANT

## 2023-06-07 NOTE — PROGRESS NOTES
Subjective:      Patient ID: Jessi Linton is a 57 y.o. female.    Chief Complaint: Pain of the Left Shoulder    Review of patient's allergies indicates:   Allergen Reactions    Tramadol Rash      Pt returns to clinic for follow up of left proximal humerus fx.  She was recently discharged from PT for meeting her treatment goals.  She is now able to reach overhead without difficulty.  Pain is improved.    4/26/23:  Pt returns to clinic for follow up of left proximal humerus fx.  She is still going to PT.  Reports that she is having trouble reaching overhead.    4/6/23:  Pt returns to clinic for follow up of left proximal humerus fx.  She is still completing PT.  No pain.  ROM has been improving per pt's mother.    3/16/23:  Pt returns to clinic for follow up of left proximal humerus fx.  She reports no pain today.  Has started OT.    2/23/23:  Pt returns to clinic for follow up of left shoulder pain/proximal humerus fx.  She reports some improvement of pain    2/1/23:  56 yo RHD F presents to clinic with parents with c/o left shoulder pain.  She tripped and fell at home a few days ago, landed on her left shooulder.  Was seen in ED after injury and diagnosed with proximal humerus fx.  Has been wearing sling since.  Mom noticed bruising to pt's upper arm.  She has tried taking pain medication prescribed by ED but makes her feel sick so has been taking tylenol arthritis with good relief of pain.  Occasional numbness/tingling to fingers when wearing sling.      Review of Systems   Constitutional: Negative for chills, diaphoresis and fever.   HENT:  Negative for congestion, ear discharge and ear pain.    Eyes:  Negative for blurred vision, discharge, double vision and pain.   Cardiovascular:  Negative for chest pain, claudication and cyanosis.   Respiratory:  Negative for cough, hemoptysis and shortness of breath.    Endocrine: Negative for cold intolerance and heat intolerance.   Skin:  Negative for color change,  dry skin, itching and rash.   Musculoskeletal:  Positive for falls and joint pain. Negative for arthritis, back pain, gout, joint swelling, muscle weakness and neck pain.   Gastrointestinal:  Negative for abdominal pain and change in bowel habit.   Neurological:  Negative for brief paralysis, disturbances in coordination and dizziness.   Psychiatric/Behavioral:  Negative for altered mental status and depression.        Objective:          General    Constitutional: She is oriented to person, place, and time. She appears well-developed and well-nourished. No distress.   HENT:   Head: Atraumatic.   Eyes: EOM are normal. Right eye exhibits no discharge. Left eye exhibits no discharge.   Cardiovascular:  Normal rate.            Pulmonary/Chest: Effort normal. No respiratory distress.   Abdominal: Soft.   Neurological: She is alert and oriented to person, place, and time.   Psychiatric: She has a normal mood and affect. Her behavior is normal.         Right Shoulder Exam     Inspection/Observation   Swelling: absent  Bruising: absent  Scars: absent  Deformity: absent    Range of Motion   Active abduction:  abnormal   Passive abduction:  abnormal   Forward Flexion:  normal   Internal rotation 0 degrees:  normal     Other   Sensation: normal    Left Shoulder Exam     Inspection/Observation   Swelling: absent  Bruising: absent  Scars: absent  Deformity: absent    Range of Motion   Active abduction:  abnormal   Passive abduction:  abnormal   Forward Flexion:  normal   Internal rotation 0 degrees:  normal     Other   Sensation: normal     Comments:  Tenderness to anterior shoulder    Limited exam due to fx and decreased ROM      Vascular Exam     Right Pulses      Radial:                    2+      Left Pulses      Radial:                    2+      Capillary Refill  Right Hand: normal capillary refill  Left Hand: normal capillary refill                Assessment:         Xray Left Shoulder 6/7/23:  Stable subacute fracture  of the proximal humerus with minimal medial distraction of the proximal component.  Fracture line is less distinct with sclerosis and projects through the region of the greater tubercle.  Overall, no evidence of adverse change upon comparison.    Xray Left Shoulder 4/26/23:  Healing subacute comminuted displaced fracture of the humeral head and neck.    Xray Left Shoulder 4/6/23:  Healing fracture involving the left humeral and neck with minimal callus formation noted.  Alignment is unchanged.    Xray Left Shoulder 3/16/23:  There is a comminuted fracture of the left humeral head and neck, mildly displaced.  Minimal callus formation as compared to the prior study.    Xray Left Shoulder 1/28/23:  Comminuted displaced oblique fracture of the humeral head and neck with mild valgus angulation.      Encounter Diagnosis   Name Primary?    Closed fracture of head of left humerus with routine healing, subsequent encounter Yes            Closed fracture of head of left humerus with routine healing, subsequent encounter                    Plan:         Pt reports that pain is completely improved and she was recently discharged from OT due to meeting treatment goals. She will return to clinic as needed.    Continue home exercises as provided by OT.  RTC as needed.    Patient voices understanding of and agreement with treatment plan. All of the patient's questions were answered and the patient will contact us if she has any questions or concerns in the interim.

## 2023-07-06 ENCOUNTER — TELEPHONE (OUTPATIENT)
Dept: FAMILY MEDICINE | Facility: CLINIC | Age: 58
End: 2023-07-06
Payer: MEDICAID

## 2023-07-06 DIAGNOSIS — E11.9 TYPE 2 DIABETES MELLITUS WITHOUT COMPLICATION, WITHOUT LONG-TERM CURRENT USE OF INSULIN: Primary | ICD-10-CM

## 2023-07-06 DIAGNOSIS — R03.0 ELEVATED BLOOD PRESSURE, SITUATIONAL: ICD-10-CM

## 2023-07-06 NOTE — TELEPHONE ENCOUNTER
Pt requesting labs before 6 mo f/u appt on 07/20. CMP and CBC last done on 03/12/23. A1c and Lipid last done on 01/12/23.    Please order any needed labs. Thanks

## 2023-07-06 NOTE — TELEPHONE ENCOUNTER
----- Message from Ephraim Cowart sent at 2023  8:20 AM CDT -----  Contact: Patient  Jessi Linton  MRN: [id number]  : 1965  PCP: David Allred  Home Phone      [phone]  Work Phone      Not on file.  Mobile          [phone]      MESSAGE: has 6 mo checkup on 23 -- would like labs done before appt if needed -- please advise    Call [phone]    PCP: Brigida

## 2023-07-17 ENCOUNTER — CLINICAL SUPPORT (OUTPATIENT)
Dept: FAMILY MEDICINE | Facility: CLINIC | Age: 58
End: 2023-07-17
Payer: MEDICAID

## 2023-07-17 DIAGNOSIS — G43.009 MIGRAINE WITHOUT AURA AND WITHOUT STATUS MIGRAINOSUS, NOT INTRACTABLE: ICD-10-CM

## 2023-07-17 DIAGNOSIS — E11.9 TYPE 2 DIABETES MELLITUS WITHOUT COMPLICATION, WITHOUT LONG-TERM CURRENT USE OF INSULIN: ICD-10-CM

## 2023-07-17 DIAGNOSIS — R03.0 ELEVATED BLOOD PRESSURE, SITUATIONAL: ICD-10-CM

## 2023-07-17 LAB
ALBUMIN SERPL BCP-MCNC: 3.4 G/DL (ref 3.5–5.2)
ALP SERPL-CCNC: 84 U/L (ref 55–135)
ALT SERPL W/O P-5'-P-CCNC: 30 U/L (ref 10–44)
ANION GAP SERPL CALC-SCNC: 11 MMOL/L (ref 8–16)
AST SERPL-CCNC: 21 U/L (ref 10–40)
BASOPHILS # BLD AUTO: 0.05 K/UL (ref 0–0.2)
BASOPHILS NFR BLD: 0.6 % (ref 0–1.9)
BILIRUB SERPL-MCNC: 0.6 MG/DL (ref 0.1–1)
BUN SERPL-MCNC: 20 MG/DL (ref 6–20)
CALCIUM SERPL-MCNC: 9.9 MG/DL (ref 8.7–10.5)
CHLORIDE SERPL-SCNC: 102 MMOL/L (ref 95–110)
CHOLEST SERPL-MCNC: 199 MG/DL (ref 120–199)
CHOLEST/HDLC SERPL: 3.9 {RATIO} (ref 2–5)
CO2 SERPL-SCNC: 28 MMOL/L (ref 23–29)
CREAT SERPL-MCNC: 0.8 MG/DL (ref 0.5–1.4)
DIFFERENTIAL METHOD: ABNORMAL
EOSINOPHIL # BLD AUTO: 0.2 K/UL (ref 0–0.5)
EOSINOPHIL NFR BLD: 1.9 % (ref 0–8)
ERYTHROCYTE [DISTWIDTH] IN BLOOD BY AUTOMATED COUNT: 13.6 % (ref 11.5–14.5)
EST. GFR  (NO RACE VARIABLE): >60 ML/MIN/1.73 M^2
ESTIMATED AVG GLUCOSE: 140 MG/DL (ref 68–131)
GLUCOSE SERPL-MCNC: 126 MG/DL (ref 70–110)
HBA1C MFR BLD: 6.5 % (ref 4–5.6)
HCT VFR BLD AUTO: 41.6 % (ref 37–48.5)
HDLC SERPL-MCNC: 51 MG/DL (ref 40–75)
HDLC SERPL: 25.6 % (ref 20–50)
HGB BLD-MCNC: 13 G/DL (ref 12–16)
IMM GRANULOCYTES # BLD AUTO: 0.04 K/UL (ref 0–0.04)
IMM GRANULOCYTES NFR BLD AUTO: 0.5 % (ref 0–0.5)
LDLC SERPL CALC-MCNC: 115.2 MG/DL (ref 63–159)
LYMPHOCYTES # BLD AUTO: 1.3 K/UL (ref 1–4.8)
LYMPHOCYTES NFR BLD: 16.3 % (ref 18–48)
MCH RBC QN AUTO: 31.5 PG (ref 27–31)
MCHC RBC AUTO-ENTMCNC: 31.3 G/DL (ref 32–36)
MCV RBC AUTO: 101 FL (ref 82–98)
MONOCYTES # BLD AUTO: 0.6 K/UL (ref 0.3–1)
MONOCYTES NFR BLD: 6.8 % (ref 4–15)
NEUTROPHILS # BLD AUTO: 6 K/UL (ref 1.8–7.7)
NEUTROPHILS NFR BLD: 73.9 % (ref 38–73)
NONHDLC SERPL-MCNC: 148 MG/DL
NRBC BLD-RTO: 0 /100 WBC
PLATELET # BLD AUTO: 232 K/UL (ref 150–450)
PMV BLD AUTO: 10.6 FL (ref 9.2–12.9)
POTASSIUM SERPL-SCNC: 4.1 MMOL/L (ref 3.5–5.1)
PROT SERPL-MCNC: 7.3 G/DL (ref 6–8.4)
RBC # BLD AUTO: 4.13 M/UL (ref 4–5.4)
SODIUM SERPL-SCNC: 141 MMOL/L (ref 136–145)
TRIGL SERPL-MCNC: 164 MG/DL (ref 30–150)
TSH SERPL DL<=0.005 MIU/L-ACNC: 2.13 UIU/ML (ref 0.4–4)
WBC # BLD AUTO: 8.08 K/UL (ref 3.9–12.7)

## 2023-07-17 PROCEDURE — 80053 COMPREHEN METABOLIC PANEL: CPT | Performed by: FAMILY MEDICINE

## 2023-07-17 PROCEDURE — 85025 COMPLETE CBC W/AUTO DIFF WBC: CPT | Performed by: FAMILY MEDICINE

## 2023-07-17 PROCEDURE — 84443 ASSAY THYROID STIM HORMONE: CPT | Performed by: FAMILY MEDICINE

## 2023-07-17 PROCEDURE — 36415 COLL VENOUS BLD VENIPUNCTURE: CPT | Mod: PBBFAC

## 2023-07-17 PROCEDURE — 83036 HEMOGLOBIN GLYCOSYLATED A1C: CPT | Performed by: FAMILY MEDICINE

## 2023-07-17 PROCEDURE — 80061 LIPID PANEL: CPT | Performed by: FAMILY MEDICINE

## 2023-07-17 RX ORDER — PROPRANOLOL HYDROCHLORIDE 20 MG/1
20 TABLET ORAL 2 TIMES DAILY
Qty: 60 TABLET | Refills: 5 | Status: SHIPPED | OUTPATIENT
Start: 2023-07-17 | End: 2024-01-17 | Stop reason: SDUPTHER

## 2023-07-17 NOTE — TELEPHONE ENCOUNTER
No care due was identified.  [facility] Embedded Care Due Messages. Reference number: [id number].   7/17/2023 8:23:32 AM CDT

## 2023-07-17 NOTE — TELEPHONE ENCOUNTER
----- Message from Pierre Balderrama Alisson rios.              Call back  [phone]     Pt requesting refills on propranoloL (INDERAL) 20 MG tablet. Needing sent to [facility] in [place].     Med pended. Thanks

## 2023-07-20 ENCOUNTER — TELEPHONE (OUTPATIENT)
Dept: FAMILY MEDICINE | Facility: CLINIC | Age: 58
End: 2023-07-20

## 2023-07-20 ENCOUNTER — OFFICE VISIT (OUTPATIENT)
Dept: FAMILY MEDICINE | Facility: CLINIC | Age: 58
End: 2023-07-20
Payer: MEDICAID

## 2023-07-20 VITALS
OXYGEN SATURATION: 97 % | HEART RATE: 85 BPM | WEIGHT: 236.25 LBS | SYSTOLIC BLOOD PRESSURE: 138 MMHG | RESPIRATION RATE: 18 BRPM | DIASTOLIC BLOOD PRESSURE: 90 MMHG | HEIGHT: 55 IN | BODY MASS INDEX: 54.67 KG/M2

## 2023-07-20 DIAGNOSIS — E66.01 MORBID OBESITY: ICD-10-CM

## 2023-07-20 DIAGNOSIS — E11.9 TYPE 2 DIABETES MELLITUS WITHOUT COMPLICATION, WITHOUT LONG-TERM CURRENT USE OF INSULIN: ICD-10-CM

## 2023-07-20 DIAGNOSIS — G47.33 OSA (OBSTRUCTIVE SLEEP APNEA): Primary | ICD-10-CM

## 2023-07-20 DIAGNOSIS — R60.1 GENERALIZED EDEMA: ICD-10-CM

## 2023-07-20 PROCEDURE — 99214 PR OFFICE/OUTPT VISIT, EST, LEVL IV, 30-39 MIN: ICD-10-PCS | Mod: S$PBB,,, | Performed by: FAMILY MEDICINE

## 2023-07-20 PROCEDURE — 3080F PR MOST RECENT DIASTOLIC BLOOD PRESSURE >= 90 MM HG: ICD-10-PCS | Mod: CPTII,,, | Performed by: FAMILY MEDICINE

## 2023-07-20 PROCEDURE — 99999 PR PBB SHADOW E&M-EST. PATIENT-LVL V: ICD-10-PCS | Mod: PBBFAC,,, | Performed by: FAMILY MEDICINE

## 2023-07-20 PROCEDURE — 3008F PR BODY MASS INDEX (BMI) DOCUMENTED: ICD-10-PCS | Mod: CPTII,,, | Performed by: FAMILY MEDICINE

## 2023-07-20 PROCEDURE — 3075F SYST BP GE 130 - 139MM HG: CPT | Mod: CPTII,,, | Performed by: FAMILY MEDICINE

## 2023-07-20 PROCEDURE — 1160F PR REVIEW ALL MEDS BY PRESCRIBER/CLIN PHARMACIST DOCUMENTED: ICD-10-PCS | Mod: CPTII,,, | Performed by: FAMILY MEDICINE

## 2023-07-20 PROCEDURE — 3075F PR MOST RECENT SYSTOLIC BLOOD PRESS GE 130-139MM HG: ICD-10-PCS | Mod: CPTII,,, | Performed by: FAMILY MEDICINE

## 2023-07-20 PROCEDURE — 3080F DIAST BP >= 90 MM HG: CPT | Mod: CPTII,,, | Performed by: FAMILY MEDICINE

## 2023-07-20 PROCEDURE — 99214 OFFICE O/P EST MOD 30 MIN: CPT | Mod: S$PBB,,, | Performed by: FAMILY MEDICINE

## 2023-07-20 PROCEDURE — 1160F RVW MEDS BY RX/DR IN RCRD: CPT | Mod: CPTII,,, | Performed by: FAMILY MEDICINE

## 2023-07-20 PROCEDURE — 99999 PR PBB SHADOW E&M-EST. PATIENT-LVL V: CPT | Mod: PBBFAC,,, | Performed by: FAMILY MEDICINE

## 2023-07-20 PROCEDURE — 1159F PR MEDICATION LIST DOCUMENTED IN MEDICAL RECORD: ICD-10-PCS | Mod: CPTII,,, | Performed by: FAMILY MEDICINE

## 2023-07-20 PROCEDURE — 3044F PR MOST RECENT HEMOGLOBIN A1C LEVEL <7.0%: ICD-10-PCS | Mod: CPTII,,, | Performed by: FAMILY MEDICINE

## 2023-07-20 PROCEDURE — 99215 OFFICE O/P EST HI 40 MIN: CPT | Mod: PBBFAC | Performed by: FAMILY MEDICINE

## 2023-07-20 PROCEDURE — 3044F HG A1C LEVEL LT 7.0%: CPT | Mod: CPTII,,, | Performed by: FAMILY MEDICINE

## 2023-07-20 PROCEDURE — 1159F MED LIST DOCD IN RCRD: CPT | Mod: CPTII,,, | Performed by: FAMILY MEDICINE

## 2023-07-20 PROCEDURE — 3008F BODY MASS INDEX DOCD: CPT | Mod: CPTII,,, | Performed by: FAMILY MEDICINE

## 2023-07-20 RX ORDER — LOSARTAN POTASSIUM 25 MG/1
25 TABLET ORAL DAILY
Qty: 30 TABLET | Refills: 5 | Status: SHIPPED | OUTPATIENT
Start: 2023-07-20 | End: 2023-08-23

## 2023-07-20 RX ORDER — METFORMIN HYDROCHLORIDE 500 MG/1
500 TABLET ORAL
Qty: 30 TABLET | Refills: 5 | Status: SHIPPED | OUTPATIENT
Start: 2023-07-20 | End: 2024-01-17 | Stop reason: SDUPTHER

## 2023-07-20 RX ORDER — ORAL SEMAGLUTIDE 3 MG/1
3 TABLET ORAL DAILY
Qty: 30 TABLET | Refills: 5 | Status: SHIPPED | OUTPATIENT
Start: 2023-07-20 | End: 2023-08-16

## 2023-07-20 RX ORDER — ATORVASTATIN CALCIUM 10 MG/1
10 TABLET, FILM COATED ORAL DAILY
Qty: 30 TABLET | Refills: 5 | Status: SHIPPED | OUTPATIENT
Start: 2023-07-20 | End: 2024-01-17 | Stop reason: SDUPTHER

## 2023-07-20 NOTE — TELEPHONE ENCOUNTER
Pt's mother (Kassandra) wanting to know if pt should be taking both losartan and propranolol. Didn't see anything d/c in chart.     Please advise. Thanks

## 2023-07-20 NOTE — TELEPHONE ENCOUNTER
----- Message from Ephraim Warren sent at 2023  4:22 PM CDT -----  Contact: Kassandra Linton  MRN: [id number]  : 1965  PCP: David Allred  Home Phone      [phone]  Work Phone      Not on file.  Mobile          [phone]      MESSAGE:     Pt mom requesting a call back to discuss medication.  Pt would like to know if she can take losartan and propranolol.        [phone]   (Kassandra)

## 2023-07-20 NOTE — PROGRESS NOTES
Subjective:       Patient ID: Jessi Linton is a 57 y.o. female.    Chief Complaint: Follow-up (Pt here for 6 mth f/u. )    Pt is a 57 y.o. female who presents for evaluation and management of   Encounter Diagnoses   Name Primary?    Morbid obesity     Generalized edema     Type 2 diabetes mellitus without complication, without long-term current use of insulin     MAME (obstructive sleep apnea) Yes   .  Doing well on current meds. Denies any side effects. Prevention is up to date.  Review of Systems   Constitutional:  Negative for chills and fever.   Respiratory:  Negative for shortness of breath.    Cardiovascular:  Negative for chest pain and palpitations.   Gastrointestinal:  Negative for abdominal pain, blood in stool, constipation and nausea.   Genitourinary:  Negative for difficulty urinating.   Musculoskeletal:  Positive for arthralgias.   Neurological:  Negative for headaches.   Psychiatric/Behavioral:  Negative for dysphoric mood, sleep disturbance and suicidal ideas. The patient is not nervous/anxious.      Objective:      Physical Exam  Constitutional:       Appearance: She is well-developed. She is obese.   HENT:      Head: Normocephalic and atraumatic.      Right Ear: External ear normal.      Left Ear: External ear normal.      Nose: Nose normal.   Eyes:      Pupils: Pupils are equal, round, and reactive to light.   Neck:      Thyroid: No thyromegaly.      Vascular: No JVD.      Trachea: No tracheal deviation.   Cardiovascular:      Rate and Rhythm: Normal rate.      Heart sounds: Normal heart sounds. No murmur heard.  Pulmonary:      Effort: Pulmonary effort is normal. No respiratory distress.      Breath sounds: Normal breath sounds. No wheezing or rales.   Chest:      Chest wall: No tenderness.   Abdominal:      General: Bowel sounds are normal. There is no distension.      Palpations: Abdomen is soft. There is no mass.      Tenderness: There is no abdominal tenderness. There is no guarding or  rebound.   Musculoskeletal:         General: No tenderness. Normal range of motion.      Cervical back: Normal range of motion and neck supple.      Right lower leg: Edema present.      Left lower leg: Edema present.   Lymphadenopathy:      Cervical: No cervical adenopathy.   Skin:     General: Skin is warm and dry.      Coloration: Skin is not pale.      Findings: No erythema or rash.   Neurological:      Mental Status: She is alert and oriented to person, place, and time.      Cranial Nerves: No cranial nerve deficit.      Motor: No abnormal muscle tone.      Coordination: Coordination normal.      Deep Tendon Reflexes: Reflexes are normal and symmetric. Reflexes normal.   Psychiatric:         Behavior: Behavior normal.         Thought Content: Thought content normal.         Judgment: Judgment normal.       Assessment:       1. MAME (obstructive sleep apnea)    2. Morbid obesity    3. Generalized edema    4. Type 2 diabetes mellitus without complication, without long-term current use of insulin        Plan:   1. MAME (obstructive sleep apnea)  Overview:  Compliant with cpap   benefitting from cpap       2. Morbid obesity  Overview:  The patient is asked to make an attempt to improve diet and exercise patterns to aid in medical management of this problem.  Cut out white carbs: bread, rice, pasta, potatoes. Exercise/walk 5x/week for at least 30 minutes  .      Orders:  -     Comprehensive Metabolic Panel; Future; Expected date: 01/18/2024    3. Generalized edema    4. Type 2 diabetes mellitus without complication, without long-term current use of insulin  Overview:  Lab Results   Component Value Date    HGBA1C 6.5 (H) 07/17/2023     The patient is asked to make an attempt to improve diet and exercise patterns to aid in medical management of this problem.  Cut out white carbs: bread, rice, pasta, potatoes. Exercise/walk 5x/week for at least 30 minutes  .      Orders:  -     Ambulatory referral/consult to Nutrition  Services; Future; Expected date: 07/27/2023  -     metFORMIN (GLUCOPHAGE) 500 MG tablet; Take 1 tablet (500 mg total) by mouth daily with breakfast.  Dispense: 30 tablet; Refill: 5  -     semaglutide (RYBELSUS) 3 mg tablet; Take 1 tablet (3 mg total) by mouth once daily.  Dispense: 30 tablet; Refill: 5  -     Comprehensive Metabolic Panel; Future; Expected date: 01/18/2024  -     Hemoglobin A1C; Future; Expected date: 01/18/2024  -     atorvastatin (LIPITOR) 10 MG tablet; Take 1 tablet (10 mg total) by mouth once daily.  Dispense: 30 tablet; Refill: 5  -     losartan (COZAAR) 25 MG tablet; Take 1 tablet (25 mg total) by mouth once daily.  Dispense: 30 tablet; Refill: 5      REsume propranolol for h/a and BP  Add ARB now that she is DM  BP check 2 weeks     RTC 6 months         No follow-ups on file.

## 2023-07-24 ENCOUNTER — TELEPHONE (OUTPATIENT)
Dept: NUTRITION | Facility: CLINIC | Age: 58
End: 2023-07-24
Payer: MEDICAID

## 2023-07-24 ENCOUNTER — TELEPHONE (OUTPATIENT)
Dept: FAMILY MEDICINE | Facility: CLINIC | Age: 58
End: 2023-07-24
Payer: MEDICAID

## 2023-07-25 NOTE — TELEPHONE ENCOUNTER
Spoke to pt's mother (Kassandra) and notified of providers recommendations. Kassandra verbalized understanding.

## 2023-07-26 ENCOUNTER — TELEPHONE (OUTPATIENT)
Dept: FAMILY MEDICINE | Facility: CLINIC | Age: 58
End: 2023-07-26
Payer: MEDICAID

## 2023-07-26 NOTE — TELEPHONE ENCOUNTER
----- Message from Yulissa Marroquin sent at 2023  1:55 PM CDT -----  Contact: self  Jessi Linton  MRN: [id number]  : 1965  PCP: David Allred  Home Phone      [phone]  Work Phone      Not on file.  Mobile          [phone]      MESSAGE:   Patient states that semaglutide (RYBELSUS) 3 mg tablet needs PA sent to [employer].      [phone]

## 2023-07-31 ENCOUNTER — TELEPHONE (OUTPATIENT)
Dept: FAMILY MEDICINE | Facility: CLINIC | Age: 58
End: 2023-07-31
Payer: MEDICAID

## 2023-07-31 DIAGNOSIS — E11.9 TYPE 2 DIABETES MELLITUS WITHOUT COMPLICATION, WITHOUT LONG-TERM CURRENT USE OF INSULIN: Primary | ICD-10-CM

## 2023-07-31 RX ORDER — TIRZEPATIDE 2.5 MG/.5ML
2.5 INJECTION, SOLUTION SUBCUTANEOUS
Qty: 4 PEN | Refills: 5 | Status: SHIPPED | OUTPATIENT
Start: 2023-07-31 | End: 2023-12-11

## 2023-08-01 NOTE — TELEPHONE ENCOUNTER
Contacted pt and notified of providers recommendations. Pt VU and will callback if med needing PA.

## 2023-08-03 ENCOUNTER — CLINICAL SUPPORT (OUTPATIENT)
Dept: FAMILY MEDICINE | Facility: CLINIC | Age: 58
End: 2023-08-03
Payer: MEDICAID

## 2023-08-03 ENCOUNTER — NUTRITION (OUTPATIENT)
Dept: NUTRITION | Facility: CLINIC | Age: 58
End: 2023-08-03
Payer: MEDICAID

## 2023-08-03 ENCOUNTER — TELEPHONE (OUTPATIENT)
Dept: FAMILY MEDICINE | Facility: CLINIC | Age: 58
End: 2023-08-03

## 2023-08-03 VITALS
BODY MASS INDEX: 53.55 KG/M2 | SYSTOLIC BLOOD PRESSURE: 120 MMHG | HEART RATE: 66 BPM | DIASTOLIC BLOOD PRESSURE: 78 MMHG | WEIGHT: 230.38 LBS | OXYGEN SATURATION: 98 %

## 2023-08-03 DIAGNOSIS — E11.9 TYPE 2 DIABETES MELLITUS WITHOUT COMPLICATION, WITHOUT LONG-TERM CURRENT USE OF INSULIN: ICD-10-CM

## 2023-08-03 DIAGNOSIS — R03.0 ELEVATED BLOOD PRESSURE, SITUATIONAL: Primary | ICD-10-CM

## 2023-08-03 PROCEDURE — 99211 OFF/OP EST MAY X REQ PHY/QHP: CPT | Mod: PBBFAC,27

## 2023-08-03 PROCEDURE — 99999 PR PBB SHADOW E&M-EST. PATIENT-LVL I: ICD-10-PCS | Mod: PBBFAC,,,

## 2023-08-03 PROCEDURE — 99999 PR PBB SHADOW E&M-EST. PATIENT-LVL I: CPT | Mod: PBBFAC,,,

## 2023-08-03 PROCEDURE — 97802 MEDICAL NUTRITION INDIV IN: CPT | Mod: PBBFAC

## 2023-08-03 PROCEDURE — 99211 OFF/OP EST MAY X REQ PHY/QHP: CPT | Mod: PBBFAC

## 2023-08-03 PROCEDURE — 99999PBSHW PR PBB SHADOW TECHNICAL ONLY FILED TO HB: ICD-10-PCS | Mod: PBBFAC,,,

## 2023-08-03 PROCEDURE — 99999PBSHW PR PBB SHADOW TECHNICAL ONLY FILED TO HB: Mod: PBBFAC,,,

## 2023-08-03 NOTE — PATIENT INSTRUCTIONS
"Prioritize water as your primary beverage. (switch to sugar free beverages, such as diet drinks or water with flavorings like crystal lite)  Prioritize vegetable and fruit intake.  Fill 1/2 of your plate with vegetable and fruits and eat these items first in order to encourage fullness and satiety.   Monitor Carbohydrate intake:  1 serving size of carbohydrate is 15 grams  See Total Carbohydrate on the nutrition facts label to determine the servings of carbohydrates. Ex. Total Carbohydrate= 32g (32/15= ~2 servings Carbohydrate)  Recommendations of 2-3 Carbohydrate Servings per Meal at each of 3 meals and 1-2 servings of Carbohydrate for snacks  Create an exercise habit:  Choose a time that it is usually convenient to initiate an exercise routine.  Set an alarm to remind yourself to exercise.  Choose a friend to be your "exercise nora" that will hold you accountable and make exercise more fun and sociable  Start small, but do some time of activity each day. Even 5 minutes of activity, such as dancing or yoga is more beneficial to your overall health than skipping a day.  Create a plan B. When your planned exercise becomes inconvenient or impossible, such as walking outdoors when the weather turns bad, an alternative routine, such as doing an exercise routine via [employer]), will prevent you from getting off track.  Give yourself graces. Everyone gets off track every now and again. Don't allow yourself to feel overwhelmed with guilt, which can cause you to self sabotage. Instead, try again tomorrow.  Exercise routines do not have to be perfect to create health benefits.  Goal- Recommended activity is 150 minutes of activity per week. It should elevate your heart rate and make you sweaty.     1) Food Item 2) Amount of Sodium per Serving 3) Number of Servings Consumed 4) Total Amount of Sodium Consumed   (Multiply Column 2 X Column 3) 5) Amount of Sodium Remaining out of Recommended Daily Sodium Intake: 2069-2608 " mg  (Subtract Column 4 - Column 5)

## 2023-08-03 NOTE — TELEPHONE ENCOUNTER
Pt came in for b/p check    Today's b/p: 120/78 p 66      Pt wanted to be weighed and she weigh 104/5 kg or 230 lbs    B/p meds:  Cozar 25 mg daily    Please advise         Mother seemed concerned about the event during her colonoscopy when her HR dropped, Feels like she needs to go see a cardiology  Also stated she thinks her feet are swelling I told her I would let you know.     Would you like me to schedule an appt to come see you?
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
## 2023-08-04 DIAGNOSIS — R00.1 BRADYCARDIA: Primary | ICD-10-CM

## 2023-08-04 NOTE — TELEPHONE ENCOUNTER
Called sandor's mother and gave her the message from Dr Allred. Told her the referral was in for dr Godinez and gave her the telephone number for the clinic

## 2023-08-07 ENCOUNTER — TELEPHONE (OUTPATIENT)
Dept: FAMILY MEDICINE | Facility: CLINIC | Age: 58
End: 2023-08-07

## 2023-08-07 ENCOUNTER — OFFICE VISIT (OUTPATIENT)
Dept: CARDIOLOGY | Facility: CLINIC | Age: 58
End: 2023-08-07
Payer: MEDICAID

## 2023-08-07 VITALS
SYSTOLIC BLOOD PRESSURE: 142 MMHG | HEART RATE: 66 BPM | HEIGHT: 55 IN | DIASTOLIC BLOOD PRESSURE: 81 MMHG | BODY MASS INDEX: 54.41 KG/M2 | OXYGEN SATURATION: 96 % | WEIGHT: 235.13 LBS

## 2023-08-07 DIAGNOSIS — G47.33 OSA (OBSTRUCTIVE SLEEP APNEA): ICD-10-CM

## 2023-08-07 DIAGNOSIS — R60.9 DEPENDENT EDEMA: ICD-10-CM

## 2023-08-07 DIAGNOSIS — I10 ESSENTIAL HYPERTENSION: ICD-10-CM

## 2023-08-07 DIAGNOSIS — R00.1 BRADYCARDIA: ICD-10-CM

## 2023-08-07 DIAGNOSIS — R60.0 LEG EDEMA, LEFT: Primary | ICD-10-CM

## 2023-08-07 DIAGNOSIS — E11.69 HYPERLIPIDEMIA ASSOCIATED WITH TYPE 2 DIABETES MELLITUS: ICD-10-CM

## 2023-08-07 DIAGNOSIS — R07.89 ATYPICAL CHEST PAIN: ICD-10-CM

## 2023-08-07 DIAGNOSIS — R06.09 DOE (DYSPNEA ON EXERTION): ICD-10-CM

## 2023-08-07 DIAGNOSIS — R06.02 SOB (SHORTNESS OF BREATH): ICD-10-CM

## 2023-08-07 DIAGNOSIS — E11.9 TYPE 2 DIABETES MELLITUS WITHOUT COMPLICATION, WITHOUT LONG-TERM CURRENT USE OF INSULIN: ICD-10-CM

## 2023-08-07 DIAGNOSIS — E78.5 HYPERLIPIDEMIA ASSOCIATED WITH TYPE 2 DIABETES MELLITUS: ICD-10-CM

## 2023-08-07 DIAGNOSIS — E66.01 MORBID OBESITY: ICD-10-CM

## 2023-08-07 PROCEDURE — 1160F RVW MEDS BY RX/DR IN RCRD: CPT | Mod: CPTII,,, | Performed by: INTERNAL MEDICINE

## 2023-08-07 PROCEDURE — 99204 OFFICE O/P NEW MOD 45 MIN: CPT | Mod: S$PBB,,, | Performed by: INTERNAL MEDICINE

## 2023-08-07 PROCEDURE — 3079F PR MOST RECENT DIASTOLIC BLOOD PRESSURE 80-89 MM HG: ICD-10-PCS | Mod: CPTII,,, | Performed by: INTERNAL MEDICINE

## 2023-08-07 PROCEDURE — 1159F PR MEDICATION LIST DOCUMENTED IN MEDICAL RECORD: ICD-10-PCS | Mod: CPTII,,, | Performed by: INTERNAL MEDICINE

## 2023-08-07 PROCEDURE — 99215 OFFICE O/P EST HI 40 MIN: CPT | Mod: PBBFAC | Performed by: INTERNAL MEDICINE

## 2023-08-07 PROCEDURE — 3079F DIAST BP 80-89 MM HG: CPT | Mod: CPTII,,, | Performed by: INTERNAL MEDICINE

## 2023-08-07 PROCEDURE — 99999 PR PBB SHADOW E&M-EST. PATIENT-LVL V: ICD-10-PCS | Mod: PBBFAC,,, | Performed by: INTERNAL MEDICINE

## 2023-08-07 PROCEDURE — 1160F PR REVIEW ALL MEDS BY PRESCRIBER/CLIN PHARMACIST DOCUMENTED: ICD-10-PCS | Mod: CPTII,,, | Performed by: INTERNAL MEDICINE

## 2023-08-07 PROCEDURE — 3044F HG A1C LEVEL LT 7.0%: CPT | Mod: CPTII,,, | Performed by: INTERNAL MEDICINE

## 2023-08-07 PROCEDURE — 3008F BODY MASS INDEX DOCD: CPT | Mod: CPTII,,, | Performed by: INTERNAL MEDICINE

## 2023-08-07 PROCEDURE — 3008F PR BODY MASS INDEX (BMI) DOCUMENTED: ICD-10-PCS | Mod: CPTII,,, | Performed by: INTERNAL MEDICINE

## 2023-08-07 PROCEDURE — 1159F MED LIST DOCD IN RCRD: CPT | Mod: CPTII,,, | Performed by: INTERNAL MEDICINE

## 2023-08-07 PROCEDURE — 4010F PR ACE/ARB THEARPY RXD/TAKEN: ICD-10-PCS | Mod: CPTII,,, | Performed by: INTERNAL MEDICINE

## 2023-08-07 PROCEDURE — 3077F SYST BP >= 140 MM HG: CPT | Mod: CPTII,,, | Performed by: INTERNAL MEDICINE

## 2023-08-07 PROCEDURE — 3044F PR MOST RECENT HEMOGLOBIN A1C LEVEL <7.0%: ICD-10-PCS | Mod: CPTII,,, | Performed by: INTERNAL MEDICINE

## 2023-08-07 PROCEDURE — 99999 PR PBB SHADOW E&M-EST. PATIENT-LVL V: CPT | Mod: PBBFAC,,, | Performed by: INTERNAL MEDICINE

## 2023-08-07 PROCEDURE — 99204 PR OFFICE/OUTPT VISIT, NEW, LEVL IV, 45-59 MIN: ICD-10-PCS | Mod: S$PBB,,, | Performed by: INTERNAL MEDICINE

## 2023-08-07 PROCEDURE — 3077F PR MOST RECENT SYSTOLIC BLOOD PRESSURE >= 140 MM HG: ICD-10-PCS | Mod: CPTII,,, | Performed by: INTERNAL MEDICINE

## 2023-08-07 PROCEDURE — 4010F ACE/ARB THERAPY RXD/TAKEN: CPT | Mod: CPTII,,, | Performed by: INTERNAL MEDICINE
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
## 2023-08-07 NOTE — ASSESSMENT & PLAN NOTE
Developing in January 2023 after a fall at home.  She reported chest pain at an ED visit.  It has not recurred.  Condition improved.  No stress test advised.   3.5

## 2023-08-07 NOTE — TELEPHONE ENCOUNTER
Jessi Linton  MRN: [id number]  : 1965  PCP: David Allred.  Home Phone [phone]   Work Phone Not on file.   Mobile [phone]       MESSAGE:   Patient came in checking the status of PA for Chema and Mounjaro.    Please contact patient with update  [phone]

## 2023-08-07 NOTE — ASSESSMENT & PLAN NOTE
Echocardiogram ordered.  Her Electrocardiogram is normal.  She has deconditioning, underlying lung disease as well as obesity as contributing components to her shortness of breath complaints.

## 2023-08-07 NOTE — ASSESSMENT & PLAN NOTE
She tolerates atorvastatin.  Most recent  mg%.  She is considered primary prevention.  Condition stable.

## 2023-08-07 NOTE — ASSESSMENT & PLAN NOTE
Venous ultrasound ordered.  We discussed compression stockings.  She takes furosemide 20 mg per day.  She has left leg swelling only at this time.

## 2023-08-07 NOTE — ASSESSMENT & PLAN NOTE
She takes losartan, Inderal.  Current agents to continue for now.  Her propranolol dosing may need to be addressed if her blood pressures are elevated in the future.

## 2023-08-09 ENCOUNTER — TELEPHONE (OUTPATIENT)
Dept: FAMILY MEDICINE | Facility: CLINIC | Age: 58
End: 2023-08-09
Payer: MEDICAID

## 2023-08-09 NOTE — TELEPHONE ENCOUNTER
PA for Mounjaro submitted through covermymeds. Received confirmation from [employer] and states a determination will be faxed in 24 hrs.     Pt notified of PA status. Also notified pt's mother, she can contact pt's insurance to determine if new BP machine will be approved since current BP cuff was covered by her insurance recently.

## 2023-08-09 NOTE — TELEPHONE ENCOUNTER
----- Message from Yulissa Marroquin sent at 2023 10:25 AM CDT -----  Contact: self  Jessi Linton  MRN: [id number]  : 1965  PCP: David Allred  Home Phone      [phone]  Work Phone      Not on file.  Mobile          [phone]      MESSAGE:   Patient having trouble reading BP--wanting to know if insurance will cover a cuff. She is inquiring about a wrist cuff.      Still inquiring about PA for roxanna    [phone]  [phone]

## 2023-08-10 ENCOUNTER — HOSPITAL ENCOUNTER (OUTPATIENT)
Dept: PULMONOLOGY | Facility: HOSPITAL | Age: 58
Discharge: HOME | End: 2023-08-10
Attending: INTERNAL MEDICINE
Payer: MEDICAID

## 2023-08-10 DIAGNOSIS — E11.9 TYPE 2 DIABETES MELLITUS WITHOUT COMPLICATION: ICD-10-CM

## 2023-08-10 DIAGNOSIS — R06.02 SOB (SHORTNESS OF BREATH): ICD-10-CM

## 2023-08-10 LAB
ASCENDING AORTA: 2.68 CM
AV INDEX (PROSTH): 0.88
AV MEAN GRADIENT: 3 MMHG
AV PEAK GRADIENT: 4 MMHG
AV VALVE AREA BY VELOCITY RATIO: 2.65 CM²
AV VALVE AREA: 2.64 CM²
AV VELOCITY RATIO: 0.88
CV ECHO LV RWT: 0.41 CM
DOP CALC AO PEAK VEL: 1.06 M/S
DOP CALC AO VTI: 25.2 CM
DOP CALC LVOT AREA: 3 CM2
DOP CALC LVOT DIAMETER: 1.96 CM
DOP CALC LVOT PEAK VEL: 0.93 M/S
DOP CALC LVOT STROKE VOLUME: 66.65 CM3
DOP CALC RVOT PEAK VEL: 0.97 M/S
DOP CALC RVOT VTI: 22.1 CM
DOP CALCLVOT PEAK VEL VTI: 22.1 CM
E WAVE DECELERATION TIME: 199.45 MSEC
E/A RATIO: 0.76
E/E' RATIO: 11.25 M/S
ECHO LV POSTERIOR WALL: 1.08 CM (ref 0.6–1.1)
FRACTIONAL SHORTENING: 28 % (ref 28–44)
INTERVENTRICULAR SEPTUM: 0.91 CM (ref 0.6–1.1)
IVRT: 95.15 MSEC
LA MAJOR: 5.32 CM
LA MINOR: 5.21 CM
LA WIDTH: 5.2 CM
LEFT ATRIUM SIZE: 3.83 CM
LEFT ATRIUM VOLUME MOD: 104.62 CM3
LEFT ATRIUM VOLUME: 89.12 CM3
LEFT INTERNAL DIMENSION IN SYSTOLE: 3.75 CM (ref 2.1–4)
LEFT VENTRICLE DIASTOLIC VOLUME: 131.29 ML
LEFT VENTRICLE SYSTOLIC VOLUME: 60.02 ML
LEFT VENTRICULAR INTERNAL DIMENSION IN DIASTOLE: 5.23 CM (ref 3.5–6)
LEFT VENTRICULAR MASS: 194.72 G
LV LATERAL E/E' RATIO: 11.25 M/S
LV SEPTAL E/E' RATIO: 11.25 M/S
LVOT MG: 2.19 MMHG
LVOT MV: 0.71 CM/S
MV PEAK A VEL: 1.19 M/S
MV PEAK E VEL: 0.9 M/S
MV STENOSIS PRESSURE HALF TIME: 57.84 MS
MV VALVE AREA P 1/2 METHOD: 3.8 CM2
PISA TR MAX VEL: 0.97 M/S
PULM VEIN S/D RATIO: 1.41
PV MEAN GRADIENT: 2 MMHG
PV MV: 0.69 M/S
PV PEAK D VEL: 0.41 M/S
PV PEAK GRADIENT: 4 MMHG
PV PEAK S VEL: 0.58 M/S
PV PEAK VELOCITY: 0.95 M/S
RA MAJOR: 5.58 CM
RA PRESSURE ESTIMATED: 3 MMHG
RIGHT VENTRICULAR END-DIASTOLIC DIMENSION: 3.15 CM
RV TB RVSP: 4 MMHG
SINUS: 3.08 CM
STJ: 2.87 CM
TDI LATERAL: 0.08 M/S
TDI SEPTAL: 0.08 M/S
TDI: 0.08 M/S
TR MAX PG: 4 MMHG
TV REST PULMONARY ARTERY PRESSURE: 7 MMHG

## 2023-08-10 PROCEDURE — 93306 TTE W/DOPPLER COMPLETE: CPT

## 2023-08-10 PROCEDURE — 93306 TTE W/DOPPLER COMPLETE: CPT | Mod: 26,,, | Performed by: INTERNAL MEDICINE

## 2023-08-10 PROCEDURE — 93306 ECHO (CUPID ONLY): ICD-10-PCS | Mod: 26,,, | Performed by: INTERNAL MEDICINE

## 2023-08-11 ENCOUNTER — TELEPHONE (OUTPATIENT)
Dept: CARDIOLOGY | Facility: CLINIC | Age: 58
End: 2023-08-11
Payer: MEDICAID

## 2023-08-11 NOTE — TELEPHONE ENCOUNTER
I left a voicemail reporting normal echo findings in regards to contractility and function of the heart.  Good news reported.

## 2023-08-16 ENCOUNTER — HOSPITAL ENCOUNTER (OUTPATIENT)
Dept: RADIOLOGY | Facility: HOSPITAL | Age: 58
Discharge: HOME | End: 2023-08-16
Attending: INTERNAL MEDICINE
Payer: MEDICAID

## 2023-08-16 ENCOUNTER — TELEPHONE (OUTPATIENT)
Dept: FAMILY MEDICINE | Facility: CLINIC | Age: 58
End: 2023-08-16

## 2023-08-16 DIAGNOSIS — R60.0 LEG EDEMA, LEFT: ICD-10-CM

## 2023-08-16 DIAGNOSIS — E11.9 TYPE 2 DIABETES MELLITUS WITHOUT COMPLICATION, WITHOUT LONG-TERM CURRENT USE OF INSULIN: Primary | ICD-10-CM

## 2023-08-16 PROCEDURE — 93970 EXTREMITY STUDY: CPT | Mod: TC

## 2023-08-16 PROCEDURE — 93970 US LOWER EXTREMITY VEINS BILATERAL: ICD-10-PCS | Mod: 26,,, | Performed by: RADIOLOGY

## 2023-08-16 PROCEDURE — 93970 EXTREMITY STUDY: CPT | Mod: 26,,, | Performed by: RADIOLOGY

## 2023-08-16 RX ORDER — SEMAGLUTIDE 0.68 MG/ML
0.5 INJECTION, SOLUTION SUBCUTANEOUS
Qty: 4 EACH | Refills: 5 | Status: SHIPPED | OUTPATIENT
Start: 2023-08-16 | End: 2023-11-13 | Stop reason: SDUPTHER

## 2023-08-16 NOTE — TELEPHONE ENCOUNTER
----- Message from Yulissa Marroquin sent at 2023  2:20 PM CDT -----  Contact: bibi/mother  Jessi Linton  MRN: [id number]  : 1965  PCP: David Allred  Home Phone      [phone]  Work Phone      Not on file.  Mobile          [phone]      MESSAGE:   Mother calling for update on PA for mounjaro    [phone]

## 2023-08-16 NOTE — TELEPHONE ENCOUNTER
Mounjaro not being covered under pt's insurance plan. Trulicity, Byetta, Ozempic, and Victoza are listed as preferred. Pt's mother has dropped off denial letter and has written notes. Letter placed at your desk.     Please advise. Thanks

## 2023-08-17 NOTE — TELEPHONE ENCOUNTER
Contacted pt's mother (Kassandra) and notified of med being sent in. Kassandra VIVAS submitted through Epic.

## 2023-08-18 ENCOUNTER — TELEPHONE (OUTPATIENT)
Dept: CARDIOLOGY | Facility: CLINIC | Age: 58
End: 2023-08-18
Payer: MEDICAID

## 2023-08-18 ENCOUNTER — TELEPHONE (OUTPATIENT)
Dept: FAMILY MEDICINE | Facility: CLINIC | Age: 58
End: 2023-08-18
Payer: MEDICAID

## 2023-08-18 NOTE — TELEPHONE ENCOUNTER
----- Message from Ephraim Cowart sent at 2023  8:15 AM CDT -----  Contact: Patient  Jessi Linton  MRN: [id number]  : 1965  PCP: David Allred  Home Phone      [phone]  Work Phone      Not on file.  Mobile          [phone]      MESSAGE: red light blinking on glucose meter (what to do) -- also, now taking Ozempic, is she to continue Metformin -- please advise    Call [phone]    PCP: Brigida

## 2023-08-18 NOTE — TELEPHONE ENCOUNTER
Called pt back and told her she would need to read the instructions of her specific glucometer to see what the red flasihning light means. Told her she could bring her box to the machine here and let us look at it to figure out what is going on  Also wanted to have us get her a copy of  her test results from Dr Wilkinson's . I told her she would need to get these results from him, that we could not give her the results from some other doctor. I told her to contact that doctor's office.

## 2023-08-21 ENCOUNTER — TELEPHONE (OUTPATIENT)
Dept: FAMILY MEDICINE | Facility: CLINIC | Age: 58
End: 2023-08-21

## 2023-08-21 ENCOUNTER — TELEPHONE (OUTPATIENT)
Dept: FAMILY MEDICINE | Facility: CLINIC | Age: 58
End: 2023-08-21
Payer: MEDICAID

## 2023-08-21 DIAGNOSIS — E11.9 TYPE 2 DIABETES MELLITUS WITHOUT COMPLICATION, WITHOUT LONG-TERM CURRENT USE OF INSULIN: Primary | ICD-10-CM

## 2023-08-21 LAB
LEFT EYE DM RETINOPATHY: NEGATIVE
RIGHT EYE DM RETINOPATHY: NEGATIVE

## 2023-08-21 RX ORDER — LANCETS
EACH MISCELLANEOUS
Qty: 100 EACH | Refills: 11 | Status: SHIPPED | OUTPATIENT
Start: 2023-08-21

## 2023-08-21 RX ORDER — INSULIN PUMP SYRINGE, 3 ML
EACH MISCELLANEOUS
Qty: 1 EACH | Refills: 0 | Status: SHIPPED | OUTPATIENT
Start: 2023-08-21

## 2023-08-23 ENCOUNTER — TELEPHONE (OUTPATIENT)
Dept: FAMILY MEDICINE | Facility: CLINIC | Age: 58
End: 2023-08-23
Payer: MEDICAID

## 2023-08-23 DIAGNOSIS — E11.9 TYPE 2 DIABETES MELLITUS WITHOUT COMPLICATION, WITHOUT LONG-TERM CURRENT USE OF INSULIN: ICD-10-CM

## 2023-08-23 RX ORDER — LOSARTAN POTASSIUM 50 MG/1
50 TABLET ORAL DAILY
Qty: 30 TABLET | Refills: 5 | Status: SHIPPED | OUTPATIENT
Start: 2023-08-23 | End: 2024-02-01 | Stop reason: SDUPTHER

## 2023-08-30 DIAGNOSIS — E11.9 TYPE 2 DIABETES MELLITUS WITHOUT COMPLICATION, UNSPECIFIED WHETHER LONG TERM INSULIN USE: ICD-10-CM

## 2023-09-07 ENCOUNTER — CLINICAL SUPPORT (OUTPATIENT)
Dept: FAMILY MEDICINE | Facility: CLINIC | Age: 58
End: 2023-09-07
Payer: MEDICAID

## 2023-09-07 ENCOUNTER — TELEPHONE (OUTPATIENT)
Dept: FAMILY MEDICINE | Facility: CLINIC | Age: 58
End: 2023-09-07

## 2023-09-07 DIAGNOSIS — R05.9 COUGH: ICD-10-CM

## 2023-09-07 DIAGNOSIS — R03.0 ELEVATED BLOOD PRESSURE, SITUATIONAL: Primary | ICD-10-CM

## 2023-09-07 RX ORDER — BENZONATATE 200 MG/1
200 CAPSULE ORAL 3 TIMES DAILY PRN
Qty: 30 CAPSULE | Refills: 1 | Status: SHIPPED | OUTPATIENT
Start: 2023-09-07 | End: 2024-02-01 | Stop reason: SDUPTHER

## 2023-09-07 NOTE — PROGRESS NOTES
Pt came in today for BP check. Also states dry cough for couple days, no other s/s noted. Pt requesting rx for cough, needing sent to [facility] in [place].  Pt also brought in BP and BS log.    BP:130/84  P: 72    Current BP meds:  - losartan (COZAAR) 50 MG tablet    Sent pcp in telephone message.

## 2023-09-07 NOTE — TELEPHONE ENCOUNTER
Pt came in today for BP check. Also states dry cough for couple days, no other s/s noted. Pt requesting rx for cough, needing sent to [facility] in [place].  Pt also brought in BP and BS log. Placed on providers desk.     BP:130/84  P: 72    Current BP meds:  - losartan (COZAAR) 50 MG tablet    Please advise. Thanks

## 2023-09-10 RX ORDER — PROMETHAZINE HYDROCHLORIDE AND DEXTROMETHORPHAN HYDROBROMIDE 6.25; 15 MG/5ML; MG/5ML
5 SYRUP ORAL EVERY 6 HOURS PRN
Qty: 180 ML | Refills: 0 | Status: SHIPPED | OUTPATIENT
Start: 2023-09-10 | End: 2023-09-20

## 2023-09-11 DIAGNOSIS — E11.9 TYPE 2 DIABETES MELLITUS WITHOUT COMPLICATION, WITHOUT LONG-TERM CURRENT USE OF INSULIN: ICD-10-CM

## 2023-09-11 NOTE — TELEPHONE ENCOUNTER
Pt requesting rx refill for test strips. Needing quantity dispensed increased if possible due to testing BS BID. Pt was previously being given quantity of 60 strips with every refill.     Please advise. Thanks

## 2023-09-11 NOTE — TELEPHONE ENCOUNTER
----- Message from Ephraim Cowart sent at 2023 11:20 AM CDT -----  Contact: Patient  Jessi Linton  MRN: [id number]  : 1965  PCP: David Allred  Home Phone      [phone]  Work Phone      Not on file.  Mobile          [phone]      MESSAGE: requesting Rx for test strips - she is testing twice daily - states she is running out too quickly -- has not tested since Sun morning --  uses [facility] [place] -- please advise    Call [phone]    PCP: Brigida

## 2023-09-11 NOTE — TELEPHONE ENCOUNTER
No care due was identified.  [facility] Embedded Care Due Messages. Reference number: [id number].   9/11/2023 2:36:14 PM CDT

## 2023-09-12 DIAGNOSIS — E11.9 TYPE 2 DIABETES MELLITUS WITHOUT COMPLICATION, WITHOUT LONG-TERM CURRENT USE OF INSULIN: ICD-10-CM

## 2023-09-12 NOTE — TELEPHONE ENCOUNTER
----- Message from Ephraim Cowart sent at 2023  3:28 PM CDT -----  Contact: Mom - Kassandra Linton  MRN: [id number]  : 1965  PCP: David Allred  Home Phone      [phone]  Work Phone      Not on file.  Mobile          [phone]      MESSAGE: Rx for test strips # 60 sent to Pharmacy -- mom states they come in boxes of # 50 -- can new Rx for # 50 be sent to [facility] in [place]     Call Kassandra @ [phone]    PCP: Brigida

## 2023-09-12 NOTE — TELEPHONE ENCOUNTER
No care due was identified.  [facility] Embedded Care Due Messages. Reference number: [id number].   9/12/2023 4:43:01 PM CDT

## 2023-09-13 ENCOUNTER — PATIENT OUTREACH (OUTPATIENT)
Dept: ADMINISTRATIVE | Facility: HOSPITAL | Age: 58
End: 2023-09-13
Payer: MEDICAID

## 2023-09-13 NOTE — PROGRESS NOTES
Chart reviewed, immunization record updated.  No new results noted on [facility] or [facility] web site.  Care Everywhere updated.   Patient care coordination note  Next PCP visit 2/1/2024.  Received external Diabetic Eye Exam collected/completed on 8/21/2023, updated to .

## 2023-09-20 DIAGNOSIS — N32.81 OAB (OVERACTIVE BLADDER): ICD-10-CM

## 2023-09-20 RX ORDER — OXYBUTYNIN CHLORIDE 5 MG/1
5 TABLET, EXTENDED RELEASE ORAL DAILY
Qty: 30 TABLET | Refills: 11 | Status: SHIPPED | OUTPATIENT
Start: 2023-09-20 | End: 2023-09-22 | Stop reason: SDUPTHER

## 2023-09-20 RX ORDER — PEN NEEDLE, DIABETIC 30 GX3/16"
1 NEEDLE, DISPOSABLE MISCELLANEOUS WEEKLY
Qty: 30 EACH | Refills: 11 | Status: SHIPPED | OUTPATIENT
Start: 2023-09-20

## 2023-09-20 NOTE — TELEPHONE ENCOUNTER
----- Message from Ephraim Cowart sent at 2023  8:52 AM CDT -----  Contact: Patient  Jessi Linton  MRN: [id number]  : 1965  PCP: David Allred  Home Phone      [phone]  Work Phone      Not on file.  Mobile          [phone]      MESSAGE:   Pt requesting refill or new Rx.   Is this a refill or new RX:  refill  RX name and strength: Oxybutynin 5 mg                                        needles  Last office visit: 23  Is this a 30-day or 90-day RX:  30 day  [facility] [place]  Comments:      Phone:  [phone]    PCP: Brigida

## 2023-09-20 NOTE — TELEPHONE ENCOUNTER
No care due was identified.  [facility] Embedded Care Due Messages. Reference number: [id number].   9/20/2023 10:01:29 AM CDT

## 2023-09-22 DIAGNOSIS — N32.81 OAB (OVERACTIVE BLADDER): ICD-10-CM

## 2023-09-22 DIAGNOSIS — E11.9 DIABETES MELLITUS WITHOUT COMPLICATION: ICD-10-CM

## 2023-09-22 NOTE — TELEPHONE ENCOUNTER
No care due was identified.  [facility] Embedded Care Due Messages. Reference number: [id number].   9/22/2023 3:39:53 PM CDT

## 2023-09-22 NOTE — TELEPHONE ENCOUNTER
----- Message from Ephraim Cowart sent at 2023 12:47 PM CDT -----  Contact: Patient  Jessi Linton  MRN: [id number]  : 1965  PCP: David Allred  Home Phone      [phone]  Work Phone      Not on file.  Mobile          [phone]      MESSAGE: requesting refills on lancets & Oxybutynin 15 mg - received Oxybutynin 5 mg  - says Dr Fuentes also prescribed the Oxybutynin 15 mg  for her bladder -- LOV 23 -- uses [facility] in [place]    Call [phone]    PCP: Brigida

## 2023-09-24 RX ORDER — LANCETS
EACH MISCELLANEOUS
Qty: 100 EACH | Refills: 1 | Status: SHIPPED | OUTPATIENT
Start: 2023-09-24

## 2023-09-24 RX ORDER — OXYBUTYNIN CHLORIDE 5 MG/1
5 TABLET, EXTENDED RELEASE ORAL DAILY
Qty: 30 TABLET | Refills: 11 | Status: SHIPPED | OUTPATIENT
Start: 2023-09-24 | End: 2023-09-29

## 2023-09-26 ENCOUNTER — TELEPHONE (OUTPATIENT)
Dept: FAMILY MEDICINE | Facility: CLINIC | Age: 58
End: 2023-09-26
Payer: MEDICAID

## 2023-09-26 DIAGNOSIS — N32.81 OAB (OVERACTIVE BLADDER): ICD-10-CM

## 2023-09-26 NOTE — TELEPHONE ENCOUNTER
So before we do this---she requested it to be decreased to 5mg back in April due to side effects----dry mouth, constipation, etc.   Does she still want to go up to 15mg????

## 2023-09-26 NOTE — TELEPHONE ENCOUNTER
Received call from pt, requesting refill on Oxybutynin 15 mg. States she has been taking both Oxybutyin 5 mg and 15 mg for the past year. Oxybutyin 15 mg not active in chart, d/c on 04/17/23 by pcp. Can this be given?     Please advise. Thanks    [facility] in [place]

## 2023-09-27 NOTE — TELEPHONE ENCOUNTER
Received callback from pt, states she would like to be back on Oxybutynin 15 mg due to longer have no side effects.

## 2023-09-29 RX ORDER — OXYBUTYNIN CHLORIDE 15 MG/1
15 TABLET, EXTENDED RELEASE ORAL DAILY
Qty: 30 TABLET | Refills: 11 | Status: SHIPPED | OUTPATIENT
Start: 2023-09-29 | End: 2024-09-28

## 2023-10-03 ENCOUNTER — PATIENT OUTREACH (OUTPATIENT)
Dept: ADMINISTRATIVE | Facility: HOSPITAL | Age: 58
End: 2023-10-03
Payer: MEDICAID

## 2023-10-03 NOTE — TELEPHONE ENCOUNTER
No care due was identified.  [facility] Embedded Care Due Messages. Reference number: [id number].   10/03/2023 9:01:41 AM CDT

## 2023-10-03 NOTE — TELEPHONE ENCOUNTER
----- Message from Ephraim Cowart sent at 10/2/2023  4:15 PM CDT -----  Contact: Patient  Jessi Linton  MRN: [id number]  : 1965  PCP: David Allred  Home Phone      [phone]  Work Phone      Not on file.  Mobile          [phone]      MESSAGE: problem getting glucose test strips - [facility] states they did not receive request -- please advise    Call [phone]    PCP: Brigida

## 2023-10-05 ENCOUNTER — TELEPHONE (OUTPATIENT)
Dept: FAMILY MEDICINE | Facility: CLINIC | Age: 58
End: 2023-10-05
Payer: MEDICAID

## 2023-10-11 ENCOUNTER — TELEPHONE (OUTPATIENT)
Dept: FAMILY MEDICINE | Facility: CLINIC | Age: 58
End: 2023-10-11

## 2023-10-11 NOTE — TELEPHONE ENCOUNTER
----- Message from Gavi Holcomb sent at 10/10/2023  3:19 PM CDT -----  Contact: Kassandra/mother  Jessi Linton  MRN: [id number]  : 1965  PCP: David Allred  Home Phone      [phone]  Work Phone      Not on file.  Mobile          [phone]      MESSAGE:     Pts mother states pt is short on blood sugar diagnostic (ONETOUCH ULTRA TEST) Strp because she was switched from 1 times a day 2 two times a day and insurance already paid for the 90 day supply and now she is out. Does the pt have type 1 or 2 diabetes and is she supposed to test 1 or 2 times a day.       Please advise  Kassandra   [phone]

## 2023-10-16 ENCOUNTER — TELEPHONE (OUTPATIENT)
Dept: FAMILY MEDICINE | Facility: CLINIC | Age: 58
End: 2023-10-16
Payer: MEDICAID

## 2023-10-16 VITALS — SYSTOLIC BLOOD PRESSURE: 112 MMHG | DIASTOLIC BLOOD PRESSURE: 75 MMHG

## 2023-10-17 ENCOUNTER — NUTRITION (OUTPATIENT)
Dept: NUTRITION | Facility: CLINIC | Age: 58
End: 2023-10-17
Payer: MEDICAID

## 2023-10-17 VITALS — BODY MASS INDEX: 51.87 KG/M2 | WEIGHT: 224.13 LBS | HEIGHT: 55 IN

## 2023-10-17 DIAGNOSIS — E11.9 TYPE 2 DIABETES MELLITUS WITHOUT COMPLICATION, WITHOUT LONG-TERM CURRENT USE OF INSULIN: Primary | ICD-10-CM

## 2023-10-17 PROCEDURE — 99999 PR PBB SHADOW E&M-EST. PATIENT-LVL I: CPT | Mod: PBBFAC,,,

## 2023-10-17 PROCEDURE — 99999PBSHW PR PBB SHADOW TECHNICAL ONLY FILED TO HB: ICD-10-PCS | Mod: PBBFAC,,,

## 2023-10-17 PROCEDURE — 99999PBSHW PR PBB SHADOW TECHNICAL ONLY FILED TO HB: Mod: PBBFAC,,,

## 2023-10-17 PROCEDURE — 97803 MED NUTRITION INDIV SUBSEQ: CPT | Mod: PBBFAC

## 2023-10-17 PROCEDURE — 99999 PR PBB SHADOW E&M-EST. PATIENT-LVL I: ICD-10-PCS | Mod: PBBFAC,,,

## 2023-10-17 PROCEDURE — 99211 OFF/OP EST MAY X REQ PHY/QHP: CPT | Mod: PBBFAC
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
## 2023-10-17 NOTE — PATIENT INSTRUCTIONS
"Pt Goal:  1) Practice calculating servings of carbohydrate from food labels.      Prioritize water as your primary beverage. (switch to sugar free beverages, such as diet drinks or water with flavorings like crystal lite)  Prioritize vegetable and fruit intake.  Fill 1/2 of your plate with vegetable and fruits and eat these items first in order to encourage fullness and satiety.   Monitor Carbohydrate intake:  1 serving size of carbohydrate is 15 grams  See Total Carbohydrate on the nutrition facts label to determine the servings of carbohydrates. Ex. Total Carbohydrate= 32g (32/15= ~2 servings Carbohydrate)  Recommendations of 2-3 Carbohydrate Servings per Meal at each of 3 meals and 1-2 servings of Carbohydrate for snacks  Create an exercise habit:  Choose a time that it is usually convenient to initiate an exercise routine.  Set an alarm to remind yourself to exercise.  Choose a friend to be your "exercise nora" that will hold you accountable and make exercise more fun and sociable  Start small, but do some time of activity each day. Even 5 minutes of activity, such as dancing or yoga is more beneficial to your overall health than skipping a day.  Create a plan B. When your planned exercise becomes inconvenient or impossible, such as walking outdoors when the weather turns bad, an alternative routine, such as doing an exercise routine via Youtube, will prevent you from getting off track.  Give yourself graces. Everyone gets off track every now and again. Don't allow yourself to feel overwhelmed with guilt, which can cause you to self sabotage. Instead, try again tomorrow.  Exercise routines do not have to be perfect to create health benefits.  Goal- Recommended activity is 150 minutes of activity per week. It should elevate your heart rate and make you sweaty.     Track sodium (Log Provided)    1) Food Item 2) Amount of Sodium per Serving 3) Number of Servings Consumed 4) Total Amount of Sodium Consumed "   (Multiply Column 2 X Column 3) 5) Amount of Sodium Remaining out of Recommended Daily Sodium Intake: 2000 mg  (Subtract Column 4 - Column 5)

## 2023-10-18 ENCOUNTER — TELEPHONE (OUTPATIENT)
Dept: FAMILY MEDICINE | Facility: CLINIC | Age: 58
End: 2023-10-18

## 2023-10-18 DIAGNOSIS — E11.9 TYPE 2 DIABETES MELLITUS WITHOUT COMPLICATION, WITHOUT LONG-TERM CURRENT USE OF INSULIN: ICD-10-CM

## 2023-10-18 RX ORDER — SEMAGLUTIDE 0.68 MG/ML
0.5 INJECTION, SOLUTION SUBCUTANEOUS
Qty: 4 EACH | Refills: 5 | Status: CANCELLED | OUTPATIENT
Start: 2023-10-18

## 2023-11-07 ENCOUNTER — TELEPHONE (OUTPATIENT)
Dept: FAMILY MEDICINE | Facility: CLINIC | Age: 58
End: 2023-11-07
Payer: MEDICAID

## 2023-11-07 NOTE — TELEPHONE ENCOUNTER
----- Message from America De Santiago sent at 2023 11:58 AM CST -----  Contact: self  Jessi Linton  MRN: [id number]  : 1965  PCP: David Allred  Home Phone      [phone]  Work Phone      Not on file.  Mobile          [phone]      MESSAGE:   Patient is wanting a medication like ozempic prescribed, please call and advise patient    [phone]

## 2023-11-13 DIAGNOSIS — E89.40 SURGICAL MENOPAUSE: ICD-10-CM

## 2023-11-13 DIAGNOSIS — E11.9 TYPE 2 DIABETES MELLITUS WITHOUT COMPLICATION, WITHOUT LONG-TERM CURRENT USE OF INSULIN: ICD-10-CM

## 2023-11-13 DIAGNOSIS — R60.9 EDEMA, UNSPECIFIED TYPE: ICD-10-CM

## 2023-11-13 NOTE — TELEPHONE ENCOUNTER
Care Due:                  Date            Visit Type   Department     Provider  --------------------------------------------------------------------------------                                EP -                              [facility]  Last Visit: 07-      CARE ([facility])   CHRISTINA Allred                              EP -                              PRIMARY      [facility]  Next Visit: 02-      CARE ([facility])   CHRISTINA Allred                                                            Last  Test          Frequency    Reason                     Performed    Due Date  --------------------------------------------------------------------------------    HBA1C.......  6 months...  metFORMIN, semaglutide,    07- 01-                             tirzepatide..............    [employer] Embedded Care Due Messages. Reference number: [id number].   11/13/2023 8:20:53 AM CST

## 2023-11-15 DIAGNOSIS — E11.9 TYPE 2 DIABETES MELLITUS WITHOUT COMPLICATION, WITHOUT LONG-TERM CURRENT USE OF INSULIN: ICD-10-CM

## 2023-11-15 RX ORDER — SEMAGLUTIDE 0.68 MG/ML
0.5 INJECTION, SOLUTION SUBCUTANEOUS
Qty: 4 EACH | Refills: 5 | OUTPATIENT
Start: 2023-11-15

## 2023-11-15 RX ORDER — SEMAGLUTIDE 0.68 MG/ML
0.5 INJECTION, SOLUTION SUBCUTANEOUS
Qty: 4 EACH | Refills: 5 | Status: SHIPPED | OUTPATIENT
Start: 2023-11-15 | End: 2024-02-01

## 2023-11-15 RX ORDER — FUROSEMIDE 20 MG/1
20 TABLET ORAL DAILY
Qty: 90 TABLET | Refills: 3 | Status: SHIPPED | OUTPATIENT
Start: 2023-11-15

## 2023-11-15 NOTE — TELEPHONE ENCOUNTER
LOV: 07/20/23  Med refill request: semaglutide (OZEMPIC) 0.25 mg or 0.5 mg (2 mg/3 mL) pen injector  Med pended.   Pharm[facility] [place]

## 2023-11-15 NOTE — TELEPHONE ENCOUNTER
No care due was identified.  [facility] Embedded Care Due Messages. Reference number: [id number].   11/15/2023 9:37:19 AM CST

## 2023-11-15 NOTE — TELEPHONE ENCOUNTER
----- Message from America De Santiago sent at 11/15/2023  9:15 AM CST -----  Contact: self  Jessi Linton  MRN: [id number]  : 1965  PCP: David Allred  Home Phone      [phone]  Work Phone      Not on file.  Mobile          [phone]      MESSAGE:   Pt requesting refill or new Rx.   Is this a refill or new RX:  refill  RX name and strength: semaglutide (OZEMPIC) 0.25 mg or 0.5 mg (2 mg/3 mL) pen injector,   Last office visit: 23  Is this a 30-day or 90-day RX:  30  Pharmacy name and location:  [facility] #[id number]  [address]      Comments:      Phone:  [phone]

## 2023-11-16 ENCOUNTER — TELEPHONE (OUTPATIENT)
Dept: FAMILY MEDICINE | Facility: CLINIC | Age: 58
End: 2023-11-16

## 2023-11-16 DIAGNOSIS — I10 PRIMARY HYPERTENSION: Primary | ICD-10-CM

## 2023-11-16 NOTE — TELEPHONE ENCOUNTER
Spoke to pt's mother (Kassandra), requesting new rx for blood pressure monitor. States current BP monitor stopped working and states last monitor was prescribed by pcp. No previous script noted in pt's chart. Can this be given or is pt needing to buy OTC.    Please advise.Thanks

## 2023-11-16 NOTE — TELEPHONE ENCOUNTER
----- Message from Ephraim Cowart sent at 2023  8:18 AM CST -----  Contact: Patient  Jessi Linton  MRN: [id number]  : 1965  PCP: David Allred  Home Phone      [phone]  Work Phone      Not on file.  Mobile          [phone]      MESSAGE: BP [place] is broken -- requiesting Rx for new one be sent ot [facility] in [place]    Call [phone]    PCP: Brigida

## 2023-11-19 RX ORDER — NEBULIZER AND COMPRESSOR
1 EACH MISCELLANEOUS DAILY
Qty: 1 EACH | Refills: 0 | COMMUNITY
Start: 2023-11-19

## 2023-12-11 ENCOUNTER — OFFICE VISIT (OUTPATIENT)
Dept: CARDIOLOGY | Facility: CLINIC | Age: 58
End: 2023-12-11
Payer: MEDICAID

## 2023-12-11 VITALS
DIASTOLIC BLOOD PRESSURE: 76 MMHG | HEART RATE: 70 BPM | BODY MASS INDEX: 51.02 KG/M2 | WEIGHT: 220.44 LBS | HEIGHT: 55 IN | RESPIRATION RATE: 18 BRPM | SYSTOLIC BLOOD PRESSURE: 123 MMHG | OXYGEN SATURATION: 98 %

## 2023-12-11 DIAGNOSIS — E11.69 HYPERLIPIDEMIA ASSOCIATED WITH TYPE 2 DIABETES MELLITUS: ICD-10-CM

## 2023-12-11 DIAGNOSIS — E66.01 MORBID OBESITY: ICD-10-CM

## 2023-12-11 DIAGNOSIS — R06.09 DOE (DYSPNEA ON EXERTION): ICD-10-CM

## 2023-12-11 DIAGNOSIS — I10 ESSENTIAL HYPERTENSION: ICD-10-CM

## 2023-12-11 DIAGNOSIS — G47.33 OSA (OBSTRUCTIVE SLEEP APNEA): ICD-10-CM

## 2023-12-11 DIAGNOSIS — E11.9 TYPE 2 DIABETES MELLITUS WITHOUT COMPLICATION, WITHOUT LONG-TERM CURRENT USE OF INSULIN: ICD-10-CM

## 2023-12-11 DIAGNOSIS — E78.5 HYPERLIPIDEMIA ASSOCIATED WITH TYPE 2 DIABETES MELLITUS: ICD-10-CM

## 2023-12-11 DIAGNOSIS — M79.89 LEG SWELLING: Primary | ICD-10-CM

## 2023-12-11 PROCEDURE — 1159F PR MEDICATION LIST DOCUMENTED IN MEDICAL RECORD: ICD-10-PCS | Mod: CPTII,,, | Performed by: INTERNAL MEDICINE

## 2023-12-11 PROCEDURE — 3074F PR MOST RECENT SYSTOLIC BLOOD PRESSURE < 130 MM HG: ICD-10-PCS | Mod: CPTII,,, | Performed by: INTERNAL MEDICINE

## 2023-12-11 PROCEDURE — 3044F PR MOST RECENT HEMOGLOBIN A1C LEVEL <7.0%: ICD-10-PCS | Mod: CPTII,,, | Performed by: INTERNAL MEDICINE

## 2023-12-11 PROCEDURE — 99215 OFFICE O/P EST HI 40 MIN: CPT | Mod: PBBFAC | Performed by: INTERNAL MEDICINE

## 2023-12-11 PROCEDURE — 99999 PR PBB SHADOW E&M-EST. PATIENT-LVL V: ICD-10-PCS | Mod: PBBFAC,,, | Performed by: INTERNAL MEDICINE

## 2023-12-11 PROCEDURE — 3044F HG A1C LEVEL LT 7.0%: CPT | Mod: CPTII,,, | Performed by: INTERNAL MEDICINE

## 2023-12-11 PROCEDURE — 3008F PR BODY MASS INDEX (BMI) DOCUMENTED: ICD-10-PCS | Mod: CPTII,,, | Performed by: INTERNAL MEDICINE

## 2023-12-11 PROCEDURE — 4010F ACE/ARB THERAPY RXD/TAKEN: CPT | Mod: CPTII,,, | Performed by: INTERNAL MEDICINE

## 2023-12-11 PROCEDURE — 1159F MED LIST DOCD IN RCRD: CPT | Mod: CPTII,,, | Performed by: INTERNAL MEDICINE

## 2023-12-11 PROCEDURE — 3078F PR MOST RECENT DIASTOLIC BLOOD PRESSURE < 80 MM HG: ICD-10-PCS | Mod: CPTII,,, | Performed by: INTERNAL MEDICINE

## 2023-12-11 PROCEDURE — 3008F BODY MASS INDEX DOCD: CPT | Mod: CPTII,,, | Performed by: INTERNAL MEDICINE

## 2023-12-11 PROCEDURE — 99999 PR PBB SHADOW E&M-EST. PATIENT-LVL V: CPT | Mod: PBBFAC,,, | Performed by: INTERNAL MEDICINE

## 2023-12-11 PROCEDURE — 1160F PR REVIEW ALL MEDS BY PRESCRIBER/CLIN PHARMACIST DOCUMENTED: ICD-10-PCS | Mod: CPTII,,, | Performed by: INTERNAL MEDICINE

## 2023-12-11 PROCEDURE — 99215 OFFICE O/P EST HI 40 MIN: CPT | Mod: S$PBB,,, | Performed by: INTERNAL MEDICINE

## 2023-12-11 PROCEDURE — 99215 PR OFFICE/OUTPT VISIT, EST, LEVL V, 40-54 MIN: ICD-10-PCS | Mod: S$PBB,,, | Performed by: INTERNAL MEDICINE

## 2023-12-11 PROCEDURE — 4010F PR ACE/ARB THEARPY RXD/TAKEN: ICD-10-PCS | Mod: CPTII,,, | Performed by: INTERNAL MEDICINE

## 2023-12-11 PROCEDURE — 1160F RVW MEDS BY RX/DR IN RCRD: CPT | Mod: CPTII,,, | Performed by: INTERNAL MEDICINE

## 2023-12-11 PROCEDURE — 3074F SYST BP LT 130 MM HG: CPT | Mod: CPTII,,, | Performed by: INTERNAL MEDICINE

## 2023-12-11 PROCEDURE — 3078F DIAST BP <80 MM HG: CPT | Mod: CPTII,,, | Performed by: INTERNAL MEDICINE

## 2023-12-11 NOTE — ASSESSMENT & PLAN NOTE
Condition stable.  She is on Ozempic and Glucophage.  Condition stable.  Most recent hemoglobin A1c 6.5.  GFR greater than 60.

## 2023-12-11 NOTE — ASSESSMENT & PLAN NOTE
A chronic condition.  She will try to access compression stockings.  She is on maintenance furosemide.  Venous insufficiency workup discussed.  She would have to [facility].  Condition unchanged.  Fluid restriction advised.
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
## 2023-12-11 NOTE — ASSESSMENT & PLAN NOTE
Her current therapy will be continued.  Her blood pressures are in good range.  Condition controlled.

## 2024-01-09 ENCOUNTER — PATIENT OUTREACH (OUTPATIENT)
Dept: ADMINISTRATIVE | Facility: HOSPITAL | Age: 59
End: 2024-01-09
Payer: MEDICAID

## 2024-01-11 ENCOUNTER — NUTRITION (OUTPATIENT)
Dept: NUTRITION | Facility: CLINIC | Age: 59
End: 2024-01-11
Payer: MEDICAID

## 2024-01-11 VITALS — HEIGHT: 55 IN | BODY MASS INDEX: 51.52 KG/M2 | WEIGHT: 222.63 LBS

## 2024-01-11 DIAGNOSIS — E11.9 TYPE 2 DIABETES MELLITUS WITHOUT COMPLICATION, WITHOUT LONG-TERM CURRENT USE OF INSULIN: Primary | ICD-10-CM

## 2024-01-11 PROCEDURE — 99212 OFFICE O/P EST SF 10 MIN: CPT | Mod: PBBFAC

## 2024-01-11 PROCEDURE — 99999 PR PBB SHADOW E&M-EST. PATIENT-LVL II: CPT | Mod: PBBFAC,,,

## 2024-01-11 PROCEDURE — 97803 MED NUTRITION INDIV SUBSEQ: CPT | Mod: PBBFAC

## 2024-01-11 PROCEDURE — 99999PBSHW PR PBB SHADOW TECHNICAL ONLY FILED TO HB: Mod: PBBFAC,,,
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
## 2024-01-11 NOTE — PATIENT INSTRUCTIONS
"Pt Goal:  1) Practice calculating servings of carbohydrate from food labels.  2) Practice forming an exercise habit.       Prioritize water as your primary beverage. (switch to sugar free beverages, such as diet drinks or water with flavorings like crystal lite)  Prioritize vegetable and fruit intake.  Fill 1/2 of your plate with vegetable and fruits and eat these items first in order to encourage fullness and satiety.   Monitor Carbohydrate intake:  1 serving size of carbohydrate is 15 grams  See Total Carbohydrate on the nutrition facts label to determine the servings of carbohydrates. Ex. Total Carbohydrate= 32g (32/15= ~2 servings Carbohydrate)  Recommendations of 2-3 Carbohydrate Servings per Meal at each of 3 meals and 1-2 servings of Carbohydrate for snacks  Create an exercise habit:  Choose a time that it is usually convenient to initiate an exercise routine.  Set an alarm to remind yourself to exercise.  Choose a friend to be your "exercise nora" that will hold you accountable and make exercise more fun and sociable  Start small, but do some time of activity each day. Even 5 minutes of activity, such as dancing or yoga is more beneficial to your overall health than skipping a day.  Create a plan B. When your planned exercise becomes inconvenient or impossible, such as walking outdoors when the weather turns bad, an alternative routine, such as doing an exercise routine via Youtube, will prevent you from getting off track.  Give yourself graces. Everyone gets off track every now and again. Don't allow yourself to feel overwhelmed with guilt, which can cause you to self sabotage. Instead, try again tomorrow.  Exercise routines do not have to be perfect to create health benefits.  Goal- Recommended activity is 150 minutes of activity per week. It should elevate your heart rate and make you sweaty.   "

## 2024-01-17 DIAGNOSIS — G43.009 MIGRAINE WITHOUT AURA AND WITHOUT STATUS MIGRAINOSUS, NOT INTRACTABLE: ICD-10-CM

## 2024-01-17 DIAGNOSIS — E11.9 TYPE 2 DIABETES MELLITUS WITHOUT COMPLICATION, WITHOUT LONG-TERM CURRENT USE OF INSULIN: ICD-10-CM

## 2024-01-17 RX ORDER — MONTELUKAST SODIUM 10 MG/1
10 TABLET ORAL NIGHTLY
Qty: 90 TABLET | Refills: 3 | Status: SHIPPED | OUTPATIENT
Start: 2024-01-17

## 2024-01-17 RX ORDER — ATORVASTATIN CALCIUM 10 MG/1
10 TABLET, FILM COATED ORAL DAILY
Qty: 30 TABLET | Refills: 5 | Status: SHIPPED | OUTPATIENT
Start: 2024-01-17 | End: 2025-01-16

## 2024-01-17 RX ORDER — PROPRANOLOL HYDROCHLORIDE 20 MG/1
20 TABLET ORAL 2 TIMES DAILY
Qty: 60 TABLET | Refills: 5 | Status: SHIPPED | OUTPATIENT
Start: 2024-01-17

## 2024-01-17 RX ORDER — METFORMIN HYDROCHLORIDE 500 MG/1
500 TABLET ORAL
Qty: 30 TABLET | Refills: 5 | Status: SHIPPED | OUTPATIENT
Start: 2024-01-17 | End: 2025-01-16

## 2024-01-17 NOTE — TELEPHONE ENCOUNTER
Care Due:                  Date            Visit Type   Department     Provider  --------------------------------------------------------------------------------                                EP -                              [facility]  Last Visit: 07-      CARE ([facility])   CHRISTINA Allred                               -                              [facility]  Next Visit: 02-      CARE ([facility])   CHRISTINA Allred                                                            Last  Test          Frequency    Reason                     Performed    Due Date  --------------------------------------------------------------------------------    HBA1C.......  6 months...  metFORMIN................  07- 01-    [facility] Embedded Care Due Messages. Reference number: [id number].   1/17/2024 3:20:35 PM CST

## 2024-01-17 NOTE — TELEPHONE ENCOUNTER
LOV: 07/20/23    Med refill request:  metFORMIN (GLUCOPHAGE) 500 MG tablet, atorvastatin (LIPITOR) 10 MG tablet, propranoloL (INDERAL) 20 MG tablet, montelukast (SINGULAIR) 10 mg tablet    Med pended to [facility] in [place].

## 2024-01-17 NOTE — TELEPHONE ENCOUNTER
----- Message from Jordi Lin sent at 2024  3:05 PM CST -----  Contact: pt  Jessi Linton  MRN: [id number]  : 1965  PCP: David Allred  Home Phone      [phone]  Work Phone      Not on file.  Mobile          [phone]      MESSAGE:     Pt called requesting refills of medications metFORMIN (GLUCOPHAGE) 500 MG tablet, atorvastatin (LIPITOR) 10 MG tablet, propranoloL (INDERAL) 20 MG tablet, and montelukast (SINGULAIR) 10 mg tablet. Pt would like medication to be sent to [facility] #[id number] - [place], [place] - [address]          Please advise  [phone]

## 2024-01-25 ENCOUNTER — CLINICAL SUPPORT (OUTPATIENT)
Dept: FAMILY MEDICINE | Facility: CLINIC | Age: 59
End: 2024-01-25
Payer: MEDICAID

## 2024-01-25 DIAGNOSIS — E11.9 TYPE 2 DIABETES MELLITUS WITHOUT COMPLICATION, WITHOUT LONG-TERM CURRENT USE OF INSULIN: ICD-10-CM

## 2024-01-25 DIAGNOSIS — E11.9 TYPE 2 DIABETES MELLITUS WITHOUT COMPLICATION: ICD-10-CM

## 2024-01-25 DIAGNOSIS — E66.01 MORBID OBESITY: ICD-10-CM

## 2024-01-25 LAB
ALBUMIN SERPL BCP-MCNC: 3.5 G/DL (ref 3.5–5.2)
ALBUMIN/CREAT UR: 3 UG/MG (ref 0–30)
ALP SERPL-CCNC: 77 U/L (ref 55–135)
ALT SERPL W/O P-5'-P-CCNC: 23 U/L (ref 10–44)
ANION GAP SERPL CALC-SCNC: 8 MMOL/L (ref 8–16)
AST SERPL-CCNC: 16 U/L (ref 10–40)
BILIRUB SERPL-MCNC: 0.6 MG/DL (ref 0.1–1)
BUN SERPL-MCNC: 23 MG/DL (ref 6–20)
CALCIUM SERPL-MCNC: 9.5 MG/DL (ref 8.7–10.5)
CHLORIDE SERPL-SCNC: 105 MMOL/L (ref 95–110)
CO2 SERPL-SCNC: 29 MMOL/L (ref 23–29)
CREAT SERPL-MCNC: 0.8 MG/DL (ref 0.5–1.4)
CREAT UR-MCNC: 198.6 MG/DL (ref 15–325)
EST. GFR  (NO RACE VARIABLE): >60 ML/MIN/1.73 M^2
GLUCOSE SERPL-MCNC: 101 MG/DL (ref 70–110)
MICROALBUMIN UR DL<=1MG/L-MCNC: 6 UG/ML
POTASSIUM SERPL-SCNC: 4.5 MMOL/L (ref 3.5–5.1)
PROT SERPL-MCNC: 7.2 G/DL (ref 6–8.4)
SODIUM SERPL-SCNC: 142 MMOL/L (ref 136–145)

## 2024-01-25 PROCEDURE — 36415 COLL VENOUS BLD VENIPUNCTURE: CPT | Mod: PBBFAC

## 2024-01-25 PROCEDURE — 82043 UR ALBUMIN QUANTITATIVE: CPT | Performed by: FAMILY MEDICINE

## 2024-01-25 PROCEDURE — 80053 COMPREHEN METABOLIC PANEL: CPT | Performed by: FAMILY MEDICINE

## 2024-01-25 PROCEDURE — 99999PBSHW PR PBB SHADOW TECHNICAL ONLY FILED TO HB: Mod: PBBFAC,,,

## 2024-01-25 PROCEDURE — 83036 HEMOGLOBIN GLYCOSYLATED A1C: CPT | Performed by: FAMILY MEDICINE

## 2024-01-26 LAB
ESTIMATED AVG GLUCOSE: 120 MG/DL (ref 68–131)
HBA1C MFR BLD: 5.8 % (ref 4–5.6)

## 2024-02-01 ENCOUNTER — OFFICE VISIT (OUTPATIENT)
Dept: FAMILY MEDICINE | Facility: CLINIC | Age: 59
End: 2024-02-01
Payer: MEDICAID

## 2024-02-01 VITALS
RESPIRATION RATE: 18 BRPM | OXYGEN SATURATION: 98 % | HEIGHT: 55 IN | BODY MASS INDEX: 51.1 KG/M2 | DIASTOLIC BLOOD PRESSURE: 80 MMHG | HEART RATE: 72 BPM | SYSTOLIC BLOOD PRESSURE: 124 MMHG | WEIGHT: 220.81 LBS

## 2024-02-01 DIAGNOSIS — E78.5 HYPERLIPIDEMIA ASSOCIATED WITH TYPE 2 DIABETES MELLITUS: ICD-10-CM

## 2024-02-01 DIAGNOSIS — R60.9 DEPENDENT EDEMA: Primary | ICD-10-CM

## 2024-02-01 DIAGNOSIS — R05.9 COUGH: ICD-10-CM

## 2024-02-01 DIAGNOSIS — E66.01 MORBID OBESITY: ICD-10-CM

## 2024-02-01 DIAGNOSIS — E11.69 HYPERLIPIDEMIA ASSOCIATED WITH TYPE 2 DIABETES MELLITUS: ICD-10-CM

## 2024-02-01 DIAGNOSIS — B37.2 INTERTRIGINOUS CANDIDIASIS: ICD-10-CM

## 2024-02-01 DIAGNOSIS — G47.33 OSA (OBSTRUCTIVE SLEEP APNEA): ICD-10-CM

## 2024-02-01 DIAGNOSIS — Z23 NEED FOR VACCINATION: ICD-10-CM

## 2024-02-01 DIAGNOSIS — G43.009 MIGRAINE WITHOUT AURA AND WITHOUT STATUS MIGRAINOSUS, NOT INTRACTABLE: ICD-10-CM

## 2024-02-01 DIAGNOSIS — E11.9 TYPE 2 DIABETES MELLITUS WITHOUT COMPLICATION, WITHOUT LONG-TERM CURRENT USE OF INSULIN: ICD-10-CM

## 2024-02-01 DIAGNOSIS — I10 ESSENTIAL HYPERTENSION: ICD-10-CM

## 2024-02-01 PROCEDURE — 99214 OFFICE O/P EST MOD 30 MIN: CPT | Mod: S$PBB,,, | Performed by: FAMILY MEDICINE

## 2024-02-01 PROCEDURE — 3074F SYST BP LT 130 MM HG: CPT | Mod: CPTII,,, | Performed by: FAMILY MEDICINE

## 2024-02-01 PROCEDURE — 1160F RVW MEDS BY RX/DR IN RCRD: CPT | Mod: CPTII,,, | Performed by: FAMILY MEDICINE

## 2024-02-01 PROCEDURE — 4010F ACE/ARB THERAPY RXD/TAKEN: CPT | Mod: CPTII,,, | Performed by: FAMILY MEDICINE

## 2024-02-01 PROCEDURE — 3008F BODY MASS INDEX DOCD: CPT | Mod: CPTII,,, | Performed by: FAMILY MEDICINE

## 2024-02-01 PROCEDURE — 99999 PR PBB SHADOW E&M-EST. PATIENT-LVL IV: CPT | Mod: PBBFAC,,, | Performed by: FAMILY MEDICINE

## 2024-02-01 PROCEDURE — 90471 IMMUNIZATION ADMIN: CPT | Mod: PBBFAC

## 2024-02-01 PROCEDURE — 99214 OFFICE O/P EST MOD 30 MIN: CPT | Mod: PBBFAC | Performed by: FAMILY MEDICINE

## 2024-02-01 PROCEDURE — 99999PBSHW FLU VACCINE (QUAD) GREATER THAN OR EQUAL TO 3YO PRESERVATIVE FREE IM: Mod: PBBFAC,,,

## 2024-02-01 PROCEDURE — 3066F NEPHROPATHY DOC TX: CPT | Mod: CPTII,,, | Performed by: FAMILY MEDICINE

## 2024-02-01 PROCEDURE — 3044F HG A1C LEVEL LT 7.0%: CPT | Mod: CPTII,,, | Performed by: FAMILY MEDICINE

## 2024-02-01 PROCEDURE — 1159F MED LIST DOCD IN RCRD: CPT | Mod: CPTII,,, | Performed by: FAMILY MEDICINE

## 2024-02-01 PROCEDURE — 3079F DIAST BP 80-89 MM HG: CPT | Mod: CPTII,,, | Performed by: FAMILY MEDICINE

## 2024-02-01 PROCEDURE — 3061F NEG MICROALBUMINURIA REV: CPT | Mod: CPTII,,, | Performed by: FAMILY MEDICINE

## 2024-02-01 RX ORDER — SEMAGLUTIDE 1.34 MG/ML
1 INJECTION, SOLUTION SUBCUTANEOUS
Qty: 3 ML | Refills: 11 | Status: SHIPPED | OUTPATIENT
Start: 2024-02-01 | End: 2025-01-31

## 2024-02-01 RX ORDER — OXYBUTYNIN CHLORIDE 5 MG/1
5 TABLET, EXTENDED RELEASE ORAL
COMMUNITY
Start: 2024-01-08

## 2024-02-01 RX ORDER — NYSTATIN 100000 U/G
CREAM TOPICAL 2 TIMES DAILY
Qty: 30 G | Refills: 2 | Status: SHIPPED | OUTPATIENT
Start: 2024-02-01

## 2024-02-01 RX ORDER — LOSARTAN POTASSIUM 50 MG/1
50 TABLET ORAL DAILY
Qty: 30 TABLET | Refills: 5 | Status: SHIPPED | OUTPATIENT
Start: 2024-02-01 | End: 2025-01-31

## 2024-02-01 RX ORDER — BENZONATATE 200 MG/1
200 CAPSULE ORAL 3 TIMES DAILY PRN
Qty: 30 CAPSULE | Refills: 1 | Status: SHIPPED | OUTPATIENT
Start: 2024-02-01 | End: 2024-06-18

## 2024-02-01 NOTE — PROGRESS NOTES
Subjective:       Patient ID: Jessi Linton is a 58 y.o. female.    Chief Complaint: Follow-up (Pt here for 6 mth f/u. )    Pt is a 58 y.o. female who presents for evaluation and management of   Encounter Diagnoses   Name Primary?    Cough     Type 2 diabetes mellitus without complication, without long-term current use of insulin     Intertriginous candidiasis     Dependent edema Yes    Essential hypertension     Hyperlipidemia associated with type 2 diabetes mellitus     Migraine without aura and without status migrainosus, not intractable     Morbid obesity     MAME (obstructive sleep apnea)    .  Doing well on current meds. Denies any side effects. Prevention is up to date.  Review of Systems   Constitutional:  Negative for chills and fever.   Respiratory:  Negative for shortness of breath.    Cardiovascular:  Negative for chest pain and palpitations.   Gastrointestinal:  Negative for abdominal pain, blood in stool, constipation and nausea.   Genitourinary:  Negative for difficulty urinating.   Psychiatric/Behavioral:  Negative for dysphoric mood, sleep disturbance and suicidal ideas. The patient is not nervous/anxious.        Objective:      Physical Exam  Constitutional:       Appearance: She is well-developed. She is obese.   HENT:      Head: Normocephalic and atraumatic.      Right Ear: External ear normal.      Left Ear: External ear normal.      Nose: Nose normal.   Eyes:      Pupils: Pupils are equal, round, and reactive to light.   Neck:      Thyroid: No thyromegaly.      Vascular: No JVD.      Trachea: No tracheal deviation.   Cardiovascular:      Rate and Rhythm: Normal rate.      Heart sounds: Normal heart sounds. No murmur heard.  Pulmonary:      Effort: Pulmonary effort is normal. No respiratory distress.      Breath sounds: Normal breath sounds. No wheezing or rales.   Chest:      Chest wall: No tenderness.   Abdominal:      General: Bowel sounds are normal. There is no distension.       Palpations: Abdomen is soft. There is no mass.      Tenderness: There is no abdominal tenderness. There is no guarding or rebound.   Musculoskeletal:         General: No tenderness. Normal range of motion.      Cervical back: Normal range of motion and neck supple.      Right lower leg: Edema present.      Left lower leg: Edema present.      Comments: Protective Sensation (w/ 10 gram monofilament):  Right: Absent  Left: Absent    Visual Inspection:  Callus -  Bilateral and Onychomycosis -  Bilateral    Pedal Pulses:   Right: Diminished  Left: Diminished    Posterior Tibialis Pulses:   Right:Diminished  Left: Diminished       Lymphadenopathy:      Cervical: No cervical adenopathy.   Skin:     General: Skin is warm and dry.      Coloration: Skin is not pale.      Findings: No erythema or rash.   Neurological:      Mental Status: She is alert and oriented to person, place, and time.      Cranial Nerves: No cranial nerve deficit.      Motor: No abnormal muscle tone.      Coordination: Coordination normal.      Deep Tendon Reflexes: Reflexes are normal and symmetric. Reflexes normal.   Psychiatric:         Behavior: Behavior normal.         Thought Content: Thought content normal.         Judgment: Judgment normal.         Assessment:       1. Dependent edema    2. Cough    3. Type 2 diabetes mellitus without complication, without long-term current use of insulin    4. Intertriginous candidiasis    5. Essential hypertension    6. Hyperlipidemia associated with type 2 diabetes mellitus    7. Migraine without aura and without status migrainosus, not intractable    8. Morbid obesity    9. MAME (obstructive sleep apnea)        Plan:   1. Dependent edema  Overview:  Lose weight   Compression stockings       2. Cough  -     benzonatate (TESSALON) 200 MG capsule; Take 1 capsule (200 mg total) by mouth 3 (three) times daily as needed for Cough. TAKE 1 CAPSULE BY MOUTH 3 (THREE) TIMES DAILY AS NEEDED FOR COUGH.  Dispense: 30  capsule; Refill: 1    3. Type 2 diabetes mellitus without complication, without long-term current use of insulin  Overview:  Lab Results   Component Value Date    HGBA1C 5.8 (H) 01/25/2024     The patient is asked to make an attempt to improve diet and exercise patterns to aid in medical management of this problem.  Cut out white carbs: bread, rice, pasta, potatoes. Exercise/walk 5x/week for at least 30 minutes  .  Ozemic 1mg       Orders:  -     losartan (COZAAR) 50 MG tablet; Take 1 tablet (50 mg total) by mouth once daily.  Dispense: 30 tablet; Refill: 5  -     semaglutide (OZEMPIC) 1 mg/dose (4 mg/3 mL); Inject 1 mg into the skin every 7 days.  Dispense: 3 mL; Refill: 11  -     Comprehensive Metabolic Panel; Future; Expected date: 08/01/2024  -     Hemoglobin A1C; Future; Expected date: 08/01/2024  -     Lipid Panel; Future; Expected date: 08/01/2024  -     TSH; Future; Expected date: 08/01/2024    4. Intertriginous candidiasis  -     nystatin (MYCOSTATIN) cream; Apply topically 2 (two) times daily.  Dispense: 30 g; Refill: 2    5. Essential hypertension  Overview:  Propranolol   Losartan         Orders:  -     Comprehensive Metabolic Panel; Future; Expected date: 08/01/2024  -     Lipid Panel; Future; Expected date: 08/01/2024  -     TSH; Future; Expected date: 08/01/2024  -     CBC Auto Differential; Future; Expected date: 08/01/2024    6. Hyperlipidemia associated with type 2 diabetes mellitus  Overview:  Lab Results   Component Value Date    LDLCALC 115.2 07/17/2023     Lipitor     Orders:  -     Comprehensive Metabolic Panel; Future; Expected date: 08/01/2024  -     Lipid Panel; Future; Expected date: 08/01/2024  -     TSH; Future; Expected date: 08/01/2024    7. Migraine without aura and without status migrainosus, not intractable  Overview:  Doing well on propranolol       8. Morbid obesity  Overview:  The patient is asked to make an attempt to improve diet and exercise patterns to aid in medical  management of this problem.  Cut out white carbs: bread, rice, pasta, potatoes. Exercise/walk 5x/week for at least 30 minutes    Ozempic     .        9. MAME (obstructive sleep apnea)  Overview:  Compliant with cpap   benefitting from cpap         No follow-ups on file.

## 2024-02-08 ENCOUNTER — HOSPITAL ENCOUNTER (OUTPATIENT)
Dept: RADIOLOGY | Facility: HOSPITAL | Age: 59
Discharge: HOME | End: 2024-02-08
Attending: FAMILY MEDICINE
Payer: MEDICAID

## 2024-02-08 VITALS — BODY MASS INDEX: 50.91 KG/M2 | WEIGHT: 220 LBS | HEIGHT: 55 IN

## 2024-02-08 DIAGNOSIS — Z12.31 BREAST CANCER SCREENING BY MAMMOGRAM: ICD-10-CM

## 2024-02-08 PROCEDURE — 77063 BREAST TOMOSYNTHESIS BI: CPT | Mod: 26,,, | Performed by: RADIOLOGY

## 2024-02-08 PROCEDURE — 77067 SCR MAMMO BI INCL CAD: CPT | Mod: TC

## 2024-02-08 PROCEDURE — 77067 SCR MAMMO BI INCL CAD: CPT | Mod: 26,,, | Performed by: RADIOLOGY

## 2024-03-13 ENCOUNTER — TELEPHONE (OUTPATIENT)
Dept: FAMILY MEDICINE | Facility: CLINIC | Age: 59
End: 2024-03-13
Payer: MEDICAID

## 2024-03-13 NOTE — LETTER
[address] [address]  Phone: [phone]  Fax: [phone] March 13, 2024    Jessi Linton  [address] [facility] [address]      To Whom It May Concern:    Jessi Linton is unable to participate in jury duty due to decreased mental capacity.  She is mentally challenged and has lived with her mother and father her entire life because she lacks the mental capacity to live independently. She is not fit to serve on a jury.     If you have any questions or concerns, please feel free to call my office.    Sincerely,        David Allred MD

## 2024-03-13 NOTE — TELEPHONE ENCOUNTER
----- Message from Ray Mckeon MA sent at 3/13/2024  8:09 AM CDT -----  Regarding: Letter to excuse jury duty  Contact: self  Pt is requesting letter to be excused from jury duty. Please advise.  ----- Message -----  From: [place] De Santiago  Sent: 3/13/2024   8:07 AM CDT  To: Brigida RIBEIRO Staff    Jessi Linton  MRN: [id number]  : 1965  PCP: David Allred.  Home Phone      [phone]  Work Phone      Not on file.  Mobile          [phone]      MESSAGE:   Patient is requesting a excuse for jury duty in [place]. Please advise   Requesting the nurse to give her a call    [phone]

## 2024-03-13 NOTE — TELEPHONE ENCOUNTER
Pt notified that letter was written and that she can pick it up from the clinic. Patient verbalized understanding.

## 2024-04-29 DIAGNOSIS — E89.40 SURGICAL MENOPAUSE: ICD-10-CM

## 2024-04-29 NOTE — TELEPHONE ENCOUNTER
----- Message from Ephraim Cowart sent at 2024  8:41 AM CDT -----  Contact: Patient  Jessi Linton  MRN: [id number]  : 1965  PCP: David Allred  Home Phone      [phone]  Work Phone      Not on file.  Mobile          [phone]      MESSAGE:   Pt requesting refill or new Rx.   Is this a refill or new RX:  refill  RX name and strength: Premarin 0.3 mg  Last office visit: 24  Is this a 30-day or 90-day RX:  30 day  [facility] [place]  Comments:      Phone:  [phone]    PCP: Brigida

## 2024-04-29 NOTE — TELEPHONE ENCOUNTER
Pt requesting medication refill. LOV: 02/01/24   [facility] [place]  Requested Prescriptions     Pending Prescriptions Disp Refills    estrogens, conjugated, (PREMARIN) 0.3 MG tablet 30 tablet 5     Sig: Take 1 tablet (0.3 mg total) by mouth once daily.

## 2024-06-05 ENCOUNTER — CLINICAL SUPPORT (OUTPATIENT)
Dept: FAMILY MEDICINE | Facility: CLINIC | Age: 59
End: 2024-06-05
Payer: MEDICAID

## 2024-06-05 DIAGNOSIS — Z23 NEED FOR PNEUMOCOCCAL 20-VALENT CONJUGATE VACCINATION: Primary | ICD-10-CM

## 2024-06-05 PROCEDURE — 99999PBSHW PNEUMOCOCCAL CONJUGATE VACCINE 20-VALENT: Mod: PBBFAC,,,

## 2024-06-05 PROCEDURE — 90677 PCV20 VACCINE IM: CPT | Mod: PBBFAC

## 2024-06-18 ENCOUNTER — OFFICE VISIT (OUTPATIENT)
Dept: FAMILY MEDICINE | Facility: CLINIC | Age: 59
End: 2024-06-18
Payer: MEDICAID

## 2024-06-18 ENCOUNTER — LAB VISIT (OUTPATIENT)
Dept: FAMILY MEDICINE | Facility: CLINIC | Age: 59
End: 2024-06-18
Payer: MEDICAID

## 2024-06-18 ENCOUNTER — TELEPHONE (OUTPATIENT)
Dept: FAMILY MEDICINE | Facility: CLINIC | Age: 59
End: 2024-06-18
Payer: MEDICAID

## 2024-06-18 VITALS
RESPIRATION RATE: 20 BRPM | SYSTOLIC BLOOD PRESSURE: 146 MMHG | WEIGHT: 212.63 LBS | HEIGHT: 55 IN | HEART RATE: 84 BPM | BODY MASS INDEX: 49.21 KG/M2 | DIASTOLIC BLOOD PRESSURE: 80 MMHG | TEMPERATURE: 99 F

## 2024-06-18 DIAGNOSIS — U07.1 COVID: ICD-10-CM

## 2024-06-18 DIAGNOSIS — R05.9 COUGH, UNSPECIFIED TYPE: Primary | ICD-10-CM

## 2024-06-18 DIAGNOSIS — R05.9 COUGH: ICD-10-CM

## 2024-06-18 DIAGNOSIS — J45.41 MODERATE PERSISTENT ASTHMA WITH ACUTE EXACERBATION: ICD-10-CM

## 2024-06-18 DIAGNOSIS — R05.9 COUGH, UNSPECIFIED TYPE: ICD-10-CM

## 2024-06-18 LAB
CTP QC/QA: YES
SARS-COV-2 RDRP RESP QL NAA+PROBE: POSITIVE

## 2024-06-18 PROCEDURE — 99213 OFFICE O/P EST LOW 20 MIN: CPT | Mod: S$PBB,,, | Performed by: FAMILY MEDICINE

## 2024-06-18 PROCEDURE — 99999 PR PBB SHADOW E&M-EST. PATIENT-LVL III: CPT | Mod: PBBFAC,,, | Performed by: FAMILY MEDICINE

## 2024-06-18 PROCEDURE — 3008F BODY MASS INDEX DOCD: CPT | Mod: CPTII,,, | Performed by: FAMILY MEDICINE

## 2024-06-18 PROCEDURE — 87635 SARS-COV-2 COVID-19 AMP PRB: CPT | Mod: PBBFAC

## 2024-06-18 PROCEDURE — 3044F HG A1C LEVEL LT 7.0%: CPT | Mod: CPTII,,, | Performed by: FAMILY MEDICINE

## 2024-06-18 PROCEDURE — 3077F SYST BP >= 140 MM HG: CPT | Mod: CPTII,,, | Performed by: FAMILY MEDICINE

## 2024-06-18 PROCEDURE — 3079F DIAST BP 80-89 MM HG: CPT | Mod: CPTII,,, | Performed by: FAMILY MEDICINE

## 2024-06-18 PROCEDURE — 3061F NEG MICROALBUMINURIA REV: CPT | Mod: CPTII,,, | Performed by: FAMILY MEDICINE

## 2024-06-18 PROCEDURE — 99213 OFFICE O/P EST LOW 20 MIN: CPT | Mod: PBBFAC | Performed by: FAMILY MEDICINE

## 2024-06-18 PROCEDURE — 4010F ACE/ARB THERAPY RXD/TAKEN: CPT | Mod: CPTII,,, | Performed by: FAMILY MEDICINE

## 2024-06-18 PROCEDURE — 3066F NEPHROPATHY DOC TX: CPT | Mod: CPTII,,, | Performed by: FAMILY MEDICINE

## 2024-06-18 PROCEDURE — 99999PBSHW: Mod: PBBFAC,,,

## 2024-06-18 RX ORDER — FLUTICASONE PROPIONATE 50 MCG
1 SPRAY, SUSPENSION (ML) NASAL DAILY
Qty: 16 G | Refills: 0 | Status: SHIPPED | OUTPATIENT
Start: 2024-06-18 | End: 2024-07-18

## 2024-06-18 RX ORDER — DEXTROMETHORPHAN HYDROBROMIDE AND GUAIFENESIN 10; 200 MG/1; MG/1
1 CAPSULE, GELATIN COATED ORAL 3 TIMES DAILY
Qty: 20 EACH | Refills: 0 | Status: SHIPPED | OUTPATIENT
Start: 2024-06-18 | End: 2024-06-28

## 2024-06-18 RX ORDER — ALBUTEROL SULFATE 90 UG/1
2 AEROSOL, METERED RESPIRATORY (INHALATION) EVERY 6 HOURS PRN
Qty: 18 G | Refills: 0 | Status: SHIPPED | OUTPATIENT
Start: 2024-06-18 | End: 2025-06-18

## 2024-06-18 RX ORDER — ALBUTEROL SULFATE 0.83 MG/ML
SOLUTION RESPIRATORY (INHALATION)
Qty: 100 ML | Refills: 1 | Status: SHIPPED | OUTPATIENT
Start: 2024-06-18

## 2024-06-18 RX ORDER — BENZONATATE 100 MG/1
100 CAPSULE ORAL 3 TIMES DAILY PRN
Qty: 30 CAPSULE | Refills: 0 | Status: SHIPPED | OUTPATIENT
Start: 2024-06-18 | End: 2024-06-18

## 2024-06-18 NOTE — TELEPHONE ENCOUNTER
Patient calling with c/o cough, congestion, close covid exposure. Appt made for today with MH. Pt VU with appt info

## 2024-06-18 NOTE — PROGRESS NOTES
Subjective:       Patient ID: Jessi Linton is a 58 y.o. female.    Chief Complaint: Cough and Nasal Congestion (Patient started yesterday with cough and congestion, states mom tested positive for covid but can't remember when she tested)    HPI  58 year old female comes in after starting with nasal congestion and a cough yesterday. She wasn't able to sleep last night because of hte cough. Her mom has covid.    PMH, PSH, ALLERGIES, SH, FH reviewed in nurse's notes above  Medications reconciled in the nurse's notes      Review of Systems   Constitutional:  Negative for chills and fever.   HENT:  Positive for congestion and sore throat. Negative for ear pain, postnasal drip, rhinorrhea and trouble swallowing.    Eyes:  Negative for redness and itching.   Respiratory:  Positive for cough. Negative for shortness of breath and wheezing.    Cardiovascular:  Negative for chest pain and palpitations.   Gastrointestinal:  Negative for abdominal pain, diarrhea, nausea and vomiting.   Genitourinary:  Negative for dysuria and frequency.   Skin:  Negative for rash.   Neurological:  Negative for weakness and headaches.       Objective:      Physical Exam  Vitals and nursing note reviewed.   Constitutional:       General: She is not in acute distress.     Appearance: She is well-developed. She is obese.   HENT:      Head: Normocephalic and atraumatic.   Eyes:      Conjunctiva/sclera: Conjunctivae normal.      Pupils: Pupils are equal, round, and reactive to light.   Neck:      Thyroid: No thyromegaly.   Cardiovascular:      Rate and Rhythm: Normal rate and regular rhythm.      Heart sounds: Normal heart sounds.   Pulmonary:      Effort: Pulmonary effort is normal. No respiratory distress.      Breath sounds: Normal breath sounds. No wheezing.   Abdominal:      General: Bowel sounds are normal.      Palpations: Abdomen is soft.      Tenderness: There is no abdominal tenderness.   Musculoskeletal:         General: Normal range  of motion.      Cervical back: Normal range of motion and neck supple.   Lymphadenopathy:      Cervical: No cervical adenopathy.   Skin:     General: Skin is warm and dry.      Findings: No rash.   Neurological:      Mental Status: She is alert and oriented to person, place, and time.   Psychiatric:         Behavior: Behavior normal.          Assessment/Plan:       Problem List Items Addressed This Visit    None  Visit Diagnoses       Cough, unspecified type    -  Primary    Relevant Orders    POCT COVID-19 Rapid Screening (Completed)    COVID        Relevant Medications    dextromethorphan-guaiFENesin (CORICIDIN HBP CHEST JANET-COUGH)  mg Cap    fluticasone propionate (FLONASE) 50 mcg/actuation nasal spray    Cough        Relevant Medications    albuterol (PROVENTIL) 2.5 mg /3 mL (0.083 %) nebulizer solution    Other Relevant Orders    POCT COVID-19 Rapid Screening (Completed)    Moderate persistent asthma with acute exacerbation        Relevant Medications    albuterol (PROVENTIL) 2.5 mg /3 mL (0.083 %) nebulizer solution          RTC if condition acutely worsens or any other concerns, otherwise RTC as scheduled

## 2024-06-19 DIAGNOSIS — U07.1 COVID-19 VIRUS DETECTED: ICD-10-CM

## 2024-06-24 ENCOUNTER — TELEPHONE (OUTPATIENT)
Dept: FAMILY MEDICINE | Facility: CLINIC | Age: 59
End: 2024-06-24
Payer: MEDICAID

## 2024-06-24 ENCOUNTER — OFFICE VISIT (OUTPATIENT)
Dept: FAMILY MEDICINE | Facility: CLINIC | Age: 59
End: 2024-06-24
Payer: MEDICAID

## 2024-06-24 VITALS
RESPIRATION RATE: 18 BRPM | OXYGEN SATURATION: 96 % | HEIGHT: 55 IN | WEIGHT: 213.31 LBS | SYSTOLIC BLOOD PRESSURE: 124 MMHG | DIASTOLIC BLOOD PRESSURE: 76 MMHG | HEART RATE: 85 BPM | BODY MASS INDEX: 49.37 KG/M2

## 2024-06-24 DIAGNOSIS — R05.9 COUGH, UNSPECIFIED TYPE: Primary | ICD-10-CM

## 2024-06-24 PROCEDURE — 99214 OFFICE O/P EST MOD 30 MIN: CPT | Mod: PBBFAC | Performed by: FAMILY MEDICINE

## 2024-06-24 PROCEDURE — 3061F NEG MICROALBUMINURIA REV: CPT | Mod: CPTII,,, | Performed by: FAMILY MEDICINE

## 2024-06-24 PROCEDURE — 3066F NEPHROPATHY DOC TX: CPT | Mod: CPTII,,, | Performed by: FAMILY MEDICINE

## 2024-06-24 PROCEDURE — 3008F BODY MASS INDEX DOCD: CPT | Mod: CPTII,,, | Performed by: FAMILY MEDICINE

## 2024-06-24 PROCEDURE — 3078F DIAST BP <80 MM HG: CPT | Mod: CPTII,,, | Performed by: FAMILY MEDICINE

## 2024-06-24 PROCEDURE — 3044F HG A1C LEVEL LT 7.0%: CPT | Mod: CPTII,,, | Performed by: FAMILY MEDICINE

## 2024-06-24 PROCEDURE — 1159F MED LIST DOCD IN RCRD: CPT | Mod: CPTII,,, | Performed by: FAMILY MEDICINE

## 2024-06-24 PROCEDURE — 99999 PR PBB SHADOW E&M-EST. PATIENT-LVL IV: CPT | Mod: PBBFAC,,, | Performed by: FAMILY MEDICINE

## 2024-06-24 PROCEDURE — 4010F ACE/ARB THERAPY RXD/TAKEN: CPT | Mod: CPTII,,, | Performed by: FAMILY MEDICINE

## 2024-06-24 PROCEDURE — 3074F SYST BP LT 130 MM HG: CPT | Mod: CPTII,,, | Performed by: FAMILY MEDICINE

## 2024-06-24 PROCEDURE — 99213 OFFICE O/P EST LOW 20 MIN: CPT | Mod: S$PBB,,, | Performed by: FAMILY MEDICINE

## 2024-06-24 RX ORDER — PROMETHAZINE HYDROCHLORIDE AND DEXTROMETHORPHAN HYDROBROMIDE 6.25; 15 MG/5ML; MG/5ML
5 SYRUP ORAL EVERY 6 HOURS PRN
Qty: 180 ML | Refills: 0 | Status: SHIPPED | OUTPATIENT
Start: 2024-06-24 | End: 2024-07-04

## 2024-06-24 RX ORDER — BENZONATATE 200 MG/1
200 CAPSULE ORAL 3 TIMES DAILY PRN
Qty: 30 CAPSULE | Refills: 1 | Status: SHIPPED | OUTPATIENT
Start: 2024-06-24 | End: 2024-07-04

## 2024-06-24 NOTE — TELEPHONE ENCOUNTER
----- Message from Jordi Lin sent at 2024  8:04 AM CDT -----  Contact: pt  Jessi Linton  MRN: [id number]  : 1965  PCP: David Allred  Home Phone      [phone]  Work Phone      Not on file.  Mobile          [phone]      MESSAGE:     Pt called to schedule appt for covid f/u. PAR didn't have anything until tomorrow. Pt wants to be seen today.        Please advise   [phone]

## 2024-06-24 NOTE — PROGRESS NOTES
Subjective:       Patient ID: Jessi Linton is a 58 y.o. female.    Chief Complaint: Cough (Pt here for cough, pt is positive for COVID 06/18/2024.)    Pt is a 58 y.o. female who presents for evaluation and management of   Encounter Diagnosis   Name Primary?    Cough, unspecified type Yes   .dx with covid on the 19th   She is doing better but concerned about productive cough of white sputum   No fever     Doing well on current meds. Denies any side effects. Prevention is up to date.    Review of Systems   Constitutional:  Negative for chills and fever.   Respiratory:  Positive for cough. Negative for shortness of breath.    Cardiovascular:  Negative for chest pain and palpitations.   Gastrointestinal:  Negative for abdominal pain, blood in stool, constipation and nausea.   Genitourinary:  Negative for difficulty urinating.   Psychiatric/Behavioral:  Negative for dysphoric mood, sleep disturbance and suicidal ideas. The patient is not nervous/anxious.        Objective:      Physical Exam  Constitutional:       Appearance: She is well-developed. She is obese.   HENT:      Head: Normocephalic and atraumatic.      Right Ear: External ear normal.      Left Ear: External ear normal.      Nose: Nose normal.   Eyes:      Pupils: Pupils are equal, round, and reactive to light.   Neck:      Thyroid: No thyromegaly.      Vascular: No JVD.      Trachea: No tracheal deviation.   Cardiovascular:      Rate and Rhythm: Normal rate.      Heart sounds: Normal heart sounds. No murmur heard.  Pulmonary:      Effort: Pulmonary effort is normal. No respiratory distress.      Breath sounds: Normal breath sounds. No wheezing or rales.   Chest:      Chest wall: No tenderness.   Abdominal:      General: Bowel sounds are normal. There is no distension.      Palpations: Abdomen is soft. There is no mass.      Tenderness: There is no abdominal tenderness. There is no guarding or rebound.   Musculoskeletal:         General: No tenderness.  Normal range of motion.      Cervical back: Normal range of motion and neck supple.   Lymphadenopathy:      Cervical: No cervical adenopathy.   Skin:     General: Skin is warm and dry.      Coloration: Skin is not pale.      Findings: No erythema or rash.   Neurological:      Mental Status: She is alert and oriented to person, place, and time.      Cranial Nerves: No cranial nerve deficit.      Motor: No abnormal muscle tone.      Coordination: Coordination normal.      Deep Tendon Reflexes: Reflexes are normal and symmetric. Reflexes normal.   Psychiatric:         Behavior: Behavior normal.         Thought Content: Thought content normal.         Judgment: Judgment normal.         Assessment:       1. Cough, unspecified type        Plan:   1. Cough, unspecified type  -     promethazine-dextromethorphan (PROMETHAZINE-DM) 6.25-15 mg/5 mL Syrp; Take 5 mLs by mouth every 6 (six) hours as needed.  Dispense: 180 mL; Refill: 0  -     benzonatate (TESSALON) 200 MG capsule; Take 1 capsule (200 mg total) by mouth 3 (three) times daily as needed for Cough.  Dispense: 30 capsule; Refill: 1      No follow-ups on file.

## 2024-06-24 NOTE — TELEPHONE ENCOUNTER
Contacted/spoke to mother (Kassandra),  requesting appt today only for covid f/u for pt due to lingering cough. Appt scheduled for today at 1730. Kassandra gave understanding.

## 2024-07-10 RX ORDER — ALBUTEROL SULFATE 90 UG/1
2 AEROSOL, METERED RESPIRATORY (INHALATION) EVERY 6 HOURS PRN
Qty: 18 G | Refills: 5 | Status: SHIPPED | OUTPATIENT
Start: 2024-07-10 | End: 2025-07-10

## 2024-07-10 NOTE — TELEPHONE ENCOUNTER
Care Due:                  Date            Visit Type   Department     Provider  --------------------------------------------------------------------------------                                EP -                              PRIMARY      [facility]  Last Visit: 06-      CARE ([facility])   MEDICINE       David Allred  Next Visit: None Scheduled  None         None Found                                                            Last  Test          Frequency    Reason                     Performed    Due Date  --------------------------------------------------------------------------------    HBA1C.......  6 months...  metFORMIN, semaglutide...  01- 07-    Lipid Panel.  12 months..  atorvastatin.............  07- 07-    [employer] Embedded Care Due Messages. Reference number: [id number].   7/10/2024 12:51:40 AM CDT

## 2024-07-10 NOTE — TELEPHONE ENCOUNTER
Refill Routing Note   Medication(s) are not appropriate for processing by [facility] for the following reason(s):        No active prescription written by provider  New or recently adjusted medication    [facility] action(s):  Defer        Medication Therapy Plan: FLOS      Appointments  past 12m or future 3m with PCP    Date Provider   Last Visit   6/24/2024 David Allred MD   Next Visit   8/8/2024 David Allred MD   ED visits in past 90 days: 0        Note composed:2:22 AM 07/10/2024

## 2024-07-17 DIAGNOSIS — E11.9 TYPE 2 DIABETES MELLITUS WITHOUT COMPLICATION, WITHOUT LONG-TERM CURRENT USE OF INSULIN: ICD-10-CM

## 2024-07-17 RX ORDER — METFORMIN HYDROCHLORIDE 500 MG/1
500 TABLET ORAL
Qty: 30 TABLET | Refills: 5 | Status: SHIPPED | OUTPATIENT
Start: 2024-07-17 | End: 2025-07-17

## 2024-07-17 NOTE — TELEPHONE ENCOUNTER
No care due was identified.  [facility] Embedded Care Due Messages. Reference number: [id number].   7/17/2024 8:32:15 AM CDT

## 2024-07-17 NOTE — TELEPHONE ENCOUNTER
Pt requesting medication refill. LOV: 06/24/24  [id number] [place] [place] - [address]  Requested Prescriptions     Pending Prescriptions Disp Refills    metFORMIN (GLUCOPHAGE) 500 MG tablet 30 tablet 5     Sig: Take 1 tablet (500 mg total) by mouth daily with breakfast.

## 2024-07-22 DIAGNOSIS — E11.9 TYPE 2 DIABETES MELLITUS WITHOUT COMPLICATION, WITHOUT LONG-TERM CURRENT USE OF INSULIN: ICD-10-CM

## 2024-07-22 DIAGNOSIS — E89.40 SURGICAL MENOPAUSE: ICD-10-CM

## 2024-07-22 DIAGNOSIS — G43.009 MIGRAINE WITHOUT AURA AND WITHOUT STATUS MIGRAINOSUS, NOT INTRACTABLE: ICD-10-CM

## 2024-07-22 RX ORDER — LOSARTAN POTASSIUM 50 MG/1
50 TABLET ORAL DAILY
Qty: 30 TABLET | Refills: 5 | Status: SHIPPED | OUTPATIENT
Start: 2024-07-22 | End: 2025-07-22

## 2024-07-22 RX ORDER — PROPRANOLOL HYDROCHLORIDE 20 MG/1
20 TABLET ORAL 2 TIMES DAILY
Qty: 60 TABLET | Refills: 5 | Status: SHIPPED | OUTPATIENT
Start: 2024-07-22

## 2024-07-22 RX ORDER — ATORVASTATIN CALCIUM 10 MG/1
10 TABLET, FILM COATED ORAL DAILY
Qty: 30 TABLET | Refills: 5 | Status: SHIPPED | OUTPATIENT
Start: 2024-07-22 | End: 2025-07-22

## 2024-07-22 RX ORDER — SEMAGLUTIDE 1.34 MG/ML
1 INJECTION, SOLUTION SUBCUTANEOUS
Qty: 3 ML | Refills: 11 | Status: SHIPPED | OUTPATIENT
Start: 2024-07-22 | End: 2025-07-22

## 2024-07-22 NOTE — TELEPHONE ENCOUNTER
No care due was identified.  [facility] Embedded Care Due Messages. Reference number: [id number].   7/22/2024 9:10:44 AM CDT

## 2024-07-22 NOTE — TELEPHONE ENCOUNTER
----- Message from Jordi Lin sent at 2024  8:23 AM CDT -----  Contact: pt  Jessi Linton  MRN: [id number]  : 1965  PCP: David Allred  Home Phone      [phone]  Work Phone      Not on file.  Mobile          [phone]      MESSAGE:     Pt called requesting refills of medications estrogens, conjugated, (PREMARIN) 0.3 MG tablet, atorvastatin (LIPITOR) 10 MG tablet, losartan (COZAAR) 50 MG tablet, semaglutide (OZEMPIC) 1 mg/dose (4 mg/3 mL), and propranoloL (INDERAL) 20 MG tablet. Pt would like medications to be sent to [facility]/pharm[id number] - [address]          Please advise  [phone]

## 2024-07-22 NOTE — TELEPHONE ENCOUNTER
Pt requesting medication refill. LOV: 6/24/24  RTC: 08/08/24 [facility] [place]   Requested Prescriptions     Pending Prescriptions Disp Refills    estrogens, conjugated, (PREMARIN) 0.3 MG tablet 30 tablet 5     Sig: Take 1 tablet (0.3 mg total) by mouth once daily.    atorvastatin (LIPITOR) 10 MG tablet 30 tablet 5     Sig: Take 1 tablet (10 mg total) by mouth once daily.    losartan (COZAAR) 50 MG tablet 30 tablet 5     Sig: Take 1 tablet (50 mg total) by mouth once daily.    semaglutide (OZEMPIC) 1 mg/dose (4 mg/3 mL) 3 mL 11     Sig: Inject 1 mg into the skin every 7 days.    propranoloL (INDERAL) 20 MG tablet 60 tablet 5     Sig: Take 1 tablet (20 mg total) by mouth 2 (two) times daily.

## 2024-07-31 ENCOUNTER — LAB VISIT (OUTPATIENT)
Dept: FAMILY MEDICINE | Facility: CLINIC | Age: 59
End: 2024-07-31
Payer: MEDICAID

## 2024-07-31 DIAGNOSIS — E11.69 HYPERLIPIDEMIA ASSOCIATED WITH TYPE 2 DIABETES MELLITUS: ICD-10-CM

## 2024-07-31 DIAGNOSIS — E78.5 HYPERLIPIDEMIA ASSOCIATED WITH TYPE 2 DIABETES MELLITUS: ICD-10-CM

## 2024-07-31 DIAGNOSIS — E11.9 TYPE 2 DIABETES MELLITUS WITHOUT COMPLICATION, WITHOUT LONG-TERM CURRENT USE OF INSULIN: ICD-10-CM

## 2024-07-31 DIAGNOSIS — I10 ESSENTIAL HYPERTENSION: ICD-10-CM

## 2024-07-31 LAB
ALBUMIN SERPL BCP-MCNC: 3.4 G/DL (ref 3.5–5.2)
ALP SERPL-CCNC: 76 U/L (ref 55–135)
ALT SERPL W/O P-5'-P-CCNC: 21 U/L (ref 10–44)
ANION GAP SERPL CALC-SCNC: 10 MMOL/L (ref 8–16)
AST SERPL-CCNC: 17 U/L (ref 10–40)
BASOPHILS # BLD AUTO: 0.05 K/UL (ref 0–0.2)
BASOPHILS NFR BLD: 0.7 % (ref 0–1.9)
BILIRUB SERPL-MCNC: 0.6 MG/DL (ref 0.1–1)
BUN SERPL-MCNC: 18 MG/DL (ref 6–20)
CALCIUM SERPL-MCNC: 9.5 MG/DL (ref 8.7–10.5)
CHLORIDE SERPL-SCNC: 100 MMOL/L (ref 95–110)
CHOLEST SERPL-MCNC: 131 MG/DL (ref 120–199)
CHOLEST/HDLC SERPL: 2.7 {RATIO} (ref 2–5)
CO2 SERPL-SCNC: 29 MMOL/L (ref 23–29)
CREAT SERPL-MCNC: 0.8 MG/DL (ref 0.5–1.4)
DIFFERENTIAL METHOD BLD: ABNORMAL
EOSINOPHIL # BLD AUTO: 0.1 K/UL (ref 0–0.5)
EOSINOPHIL NFR BLD: 1.1 % (ref 0–8)
ERYTHROCYTE [DISTWIDTH] IN BLOOD BY AUTOMATED COUNT: 13.4 % (ref 11.5–14.5)
EST. GFR  (NO RACE VARIABLE): >60 ML/MIN/1.73 M^2
ESTIMATED AVG GLUCOSE: 114 MG/DL (ref 68–131)
GLUCOSE SERPL-MCNC: 90 MG/DL (ref 70–110)
HBA1C MFR BLD: 5.6 % (ref 4–5.6)
HCT VFR BLD AUTO: 39.3 % (ref 37–48.5)
HDLC SERPL-MCNC: 48 MG/DL (ref 40–75)
HDLC SERPL: 36.6 % (ref 20–50)
HGB BLD-MCNC: 12.6 G/DL (ref 12–16)
IMM GRANULOCYTES # BLD AUTO: 0.02 K/UL (ref 0–0.04)
IMM GRANULOCYTES NFR BLD AUTO: 0.3 % (ref 0–0.5)
LDLC SERPL CALC-MCNC: 61.6 MG/DL (ref 63–159)
LYMPHOCYTES # BLD AUTO: 1.3 K/UL (ref 1–4.8)
LYMPHOCYTES NFR BLD: 16.8 % (ref 18–48)
MCH RBC QN AUTO: 32.1 PG (ref 27–31)
MCHC RBC AUTO-ENTMCNC: 32.1 G/DL (ref 32–36)
MCV RBC AUTO: 100 FL (ref 82–98)
MONOCYTES # BLD AUTO: 0.5 K/UL (ref 0.3–1)
MONOCYTES NFR BLD: 6.4 % (ref 4–15)
NEUTROPHILS # BLD AUTO: 5.6 K/UL (ref 1.8–7.7)
NEUTROPHILS NFR BLD: 74.7 % (ref 38–73)
NONHDLC SERPL-MCNC: 83 MG/DL
NRBC BLD-RTO: 0 /100 WBC
PLATELET # BLD AUTO: 253 K/UL (ref 150–450)
PMV BLD AUTO: 10 FL (ref 9.2–12.9)
POTASSIUM SERPL-SCNC: 4.7 MMOL/L (ref 3.5–5.1)
PROT SERPL-MCNC: 7.1 G/DL (ref 6–8.4)
RBC # BLD AUTO: 3.92 M/UL (ref 4–5.4)
SODIUM SERPL-SCNC: 139 MMOL/L (ref 136–145)
TRIGL SERPL-MCNC: 107 MG/DL (ref 30–150)
TSH SERPL DL<=0.005 MIU/L-ACNC: 1.8 UIU/ML (ref 0.4–4)
WBC # BLD AUTO: 7.49 K/UL (ref 3.9–12.7)

## 2024-07-31 PROCEDURE — 36415 COLL VENOUS BLD VENIPUNCTURE: CPT | Mod: PBBFAC

## 2024-07-31 PROCEDURE — 99999PBSHW PR PBB SHADOW TECHNICAL ONLY FILED TO HB: Mod: PBBFAC,,,

## 2024-07-31 PROCEDURE — 84443 ASSAY THYROID STIM HORMONE: CPT | Performed by: FAMILY MEDICINE

## 2024-07-31 PROCEDURE — 80061 LIPID PANEL: CPT | Performed by: FAMILY MEDICINE

## 2024-07-31 PROCEDURE — 80053 COMPREHEN METABOLIC PANEL: CPT | Performed by: FAMILY MEDICINE

## 2024-07-31 PROCEDURE — 83036 HEMOGLOBIN GLYCOSYLATED A1C: CPT | Performed by: FAMILY MEDICINE

## 2024-07-31 PROCEDURE — 85025 COMPLETE CBC W/AUTO DIFF WBC: CPT | Performed by: FAMILY MEDICINE

## 2024-08-01 LAB
LEFT EYE DM RETINOPATHY: POSITIVE
RIGHT EYE DM RETINOPATHY: NEGATIVE

## 2024-08-02 NOTE — ADDENDUM NOTE
Addended by: CONRADO GARCIA on: 9/29/2023 10:33 AM     Modules accepted: Orders     I have personally seen and examined the patient. I have collaborated with and supervised the

## 2024-08-08 ENCOUNTER — OFFICE VISIT (OUTPATIENT)
Dept: FAMILY MEDICINE | Facility: CLINIC | Age: 59
End: 2024-08-08
Payer: MEDICAID

## 2024-08-08 VITALS
WEIGHT: 217.38 LBS | HEART RATE: 73 BPM | DIASTOLIC BLOOD PRESSURE: 82 MMHG | SYSTOLIC BLOOD PRESSURE: 144 MMHG | BODY MASS INDEX: 50.31 KG/M2 | RESPIRATION RATE: 18 BRPM | HEIGHT: 55 IN | OXYGEN SATURATION: 98 %

## 2024-08-08 DIAGNOSIS — R60.9 DEPENDENT EDEMA: ICD-10-CM

## 2024-08-08 DIAGNOSIS — I10 ESSENTIAL HYPERTENSION: ICD-10-CM

## 2024-08-08 DIAGNOSIS — G47.33 OSA (OBSTRUCTIVE SLEEP APNEA): ICD-10-CM

## 2024-08-08 DIAGNOSIS — E66.01 MORBID OBESITY: Primary | ICD-10-CM

## 2024-08-08 DIAGNOSIS — E11.69 HYPERLIPIDEMIA ASSOCIATED WITH TYPE 2 DIABETES MELLITUS: ICD-10-CM

## 2024-08-08 DIAGNOSIS — E11.9 TYPE 2 DIABETES MELLITUS WITHOUT COMPLICATION, WITHOUT LONG-TERM CURRENT USE OF INSULIN: ICD-10-CM

## 2024-08-08 DIAGNOSIS — E78.5 HYPERLIPIDEMIA ASSOCIATED WITH TYPE 2 DIABETES MELLITUS: ICD-10-CM

## 2024-08-08 PROCEDURE — 99215 OFFICE O/P EST HI 40 MIN: CPT | Mod: PBBFAC | Performed by: FAMILY MEDICINE

## 2024-08-08 PROCEDURE — 99999 PR PBB SHADOW E&M-EST. PATIENT-LVL V: CPT | Mod: PBBFAC,,, | Performed by: FAMILY MEDICINE

## 2024-08-08 PROCEDURE — 1159F MED LIST DOCD IN RCRD: CPT | Mod: CPTII,,, | Performed by: FAMILY MEDICINE

## 2024-08-08 PROCEDURE — 4010F ACE/ARB THERAPY RXD/TAKEN: CPT | Mod: CPTII,,, | Performed by: FAMILY MEDICINE

## 2024-08-08 PROCEDURE — 3079F DIAST BP 80-89 MM HG: CPT | Mod: CPTII,,, | Performed by: FAMILY MEDICINE

## 2024-08-08 PROCEDURE — 3061F NEG MICROALBUMINURIA REV: CPT | Mod: CPTII,,, | Performed by: FAMILY MEDICINE

## 2024-08-08 PROCEDURE — G2211 COMPLEX E/M VISIT ADD ON: HCPCS | Mod: S$PBB,,, | Performed by: FAMILY MEDICINE

## 2024-08-08 PROCEDURE — 3077F SYST BP >= 140 MM HG: CPT | Mod: CPTII,,, | Performed by: FAMILY MEDICINE

## 2024-08-08 PROCEDURE — 3008F BODY MASS INDEX DOCD: CPT | Mod: CPTII,,, | Performed by: FAMILY MEDICINE

## 2024-08-08 PROCEDURE — 1160F RVW MEDS BY RX/DR IN RCRD: CPT | Mod: CPTII,,, | Performed by: FAMILY MEDICINE

## 2024-08-08 PROCEDURE — 99214 OFFICE O/P EST MOD 30 MIN: CPT | Mod: S$PBB,,, | Performed by: FAMILY MEDICINE

## 2024-08-08 PROCEDURE — 3044F HG A1C LEVEL LT 7.0%: CPT | Mod: CPTII,,, | Performed by: FAMILY MEDICINE

## 2024-08-08 PROCEDURE — 3066F NEPHROPATHY DOC TX: CPT | Mod: CPTII,,, | Performed by: FAMILY MEDICINE

## 2024-08-08 RX ORDER — LOSARTAN POTASSIUM 100 MG/1
100 TABLET ORAL DAILY
Qty: 30 TABLET | Refills: 5 | Status: SHIPPED | OUTPATIENT
Start: 2024-08-08 | End: 2025-08-08

## 2024-08-14 DIAGNOSIS — U07.1 COVID-19: ICD-10-CM

## 2024-08-14 RX ORDER — FLUTICASONE PROPIONATE 50 MCG
SPRAY, SUSPENSION (ML) NASAL
Qty: 32 ML | Refills: 3 | Status: SHIPPED | OUTPATIENT
Start: 2024-08-14

## 2024-08-14 NOTE — TELEPHONE ENCOUNTER
Refill Routing Note   Medication(s) are not appropriate for processing by [facility] for the following reason(s):        No active prescription written by provider    [facility] action(s):  Defer               Appointments  past 12m or future 3m with PCP    Date Provider   Last Visit   8/8/2024 David Allred MD   Next Visit   Visit date not found David Allred MD   ED visits in past 90 days: 0        Note composed:11:17 AM 08/14/2024

## 2024-08-14 NOTE — TELEPHONE ENCOUNTER
No care due was identified.  [facility] Embedded Care Due Messages. Reference number: [id number].   8/14/2024 1:37:18 AM CDT

## 2024-08-15 ENCOUNTER — PATIENT OUTREACH (OUTPATIENT)
Dept: ADMINISTRATIVE | Facility: HOSPITAL | Age: 59
End: 2024-08-15
Payer: MEDICAID

## 2024-08-21 ENCOUNTER — TELEPHONE (OUTPATIENT)
Dept: FAMILY MEDICINE | Facility: CLINIC | Age: 59
End: 2024-08-21

## 2024-08-21 ENCOUNTER — CLINICAL SUPPORT (OUTPATIENT)
Dept: FAMILY MEDICINE | Facility: CLINIC | Age: 59
End: 2024-08-21
Payer: MEDICAID

## 2024-08-21 VITALS — DIASTOLIC BLOOD PRESSURE: 78 MMHG | SYSTOLIC BLOOD PRESSURE: 136 MMHG | HEART RATE: 76 BPM

## 2024-08-21 DIAGNOSIS — I10 ESSENTIAL HYPERTENSION: ICD-10-CM

## 2024-08-21 LAB
ANION GAP SERPL CALC-SCNC: 9 MMOL/L (ref 8–16)
BUN SERPL-MCNC: 23 MG/DL (ref 6–20)
CALCIUM SERPL-MCNC: 9.7 MG/DL (ref 8.7–10.5)
CHLORIDE SERPL-SCNC: 104 MMOL/L (ref 95–110)
CO2 SERPL-SCNC: 29 MMOL/L (ref 23–29)
CREAT SERPL-MCNC: 0.8 MG/DL (ref 0.5–1.4)
EST. GFR  (NO RACE VARIABLE): >60 ML/MIN/1.73 M^2
GLUCOSE SERPL-MCNC: 87 MG/DL (ref 70–110)
POTASSIUM SERPL-SCNC: 4.6 MMOL/L (ref 3.5–5.1)
SODIUM SERPL-SCNC: 142 MMOL/L (ref 136–145)

## 2024-08-21 PROCEDURE — 80048 BASIC METABOLIC PNL TOTAL CA: CPT | Performed by: FAMILY MEDICINE

## 2024-08-21 PROCEDURE — 99999PBSHW PR PBB SHADOW TECHNICAL ONLY FILED TO HB: Mod: PBBFAC,,,

## 2024-08-21 PROCEDURE — 36415 COLL VENOUS BLD VENIPUNCTURE: CPT | Mod: PBBFAC

## 2024-08-21 NOTE — TELEPHONE ENCOUNTER
Pt came in for a BP/HR check. Reports taking meds everyday w/no complaints.     BP: 136/78   P: 76    Current BP meds:  - losartan (COZAAR) 100 MG tablet  - propranoloL (INDERAL) 20 MG tablet    Please advise. Thanks

## 2024-10-24 ENCOUNTER — TELEPHONE (OUTPATIENT)
Dept: FAMILY MEDICINE | Facility: CLINIC | Age: 59
End: 2024-10-24
Payer: MEDICAID

## 2024-10-24 NOTE — TELEPHONE ENCOUNTER
----- Message from Ephraim sent at 10/24/2024  8:55 AM CDT -----  Contact: Patient  Jessi Linton  MRN: [id number]  : 1965  PCP: David Allred  Home Phone      [phone]  Work Phone      Not on file.  Mobile          [phone]      MESSAGE: needs C-pap supplies - states she called [facility] (no longer in business) -- please advise    Call Jessi @ [phone]    PCP: Brigida

## 2024-10-24 NOTE — TELEPHONE ENCOUNTER
Contacted/spoke to pt, states [facility] is not in business. Requesting Cpap supplies order sent to [facility] at [phone]. States she contacted her insurance and given DME info. Cpap order and clinical notes afxed to given fax, confirmation received. Pt notified and VU.

## 2024-10-25 DIAGNOSIS — E11.9 DIABETES MELLITUS WITHOUT COMPLICATION: ICD-10-CM

## 2024-10-25 DIAGNOSIS — N32.81 OAB (OVERACTIVE BLADDER): ICD-10-CM

## 2024-10-25 RX ORDER — LANCETS
EACH MISCELLANEOUS
Qty: 100 EACH | Refills: 1 | Status: SHIPPED | OUTPATIENT
Start: 2024-10-25

## 2024-10-25 RX ORDER — PEN NEEDLE, DIABETIC 30 GX3/16"
1 NEEDLE, DISPOSABLE MISCELLANEOUS WEEKLY
Qty: 30 EACH | Refills: 11 | Status: SHIPPED | OUTPATIENT
Start: 2024-10-25

## 2024-10-25 RX ORDER — OXYBUTYNIN CHLORIDE 5 MG/1
5 TABLET, EXTENDED RELEASE ORAL DAILY
Qty: 90 TABLET | Refills: 0 | Status: SHIPPED | OUTPATIENT
Start: 2024-10-25 | End: 2025-01-23

## 2024-10-25 RX ORDER — OXYBUTYNIN CHLORIDE 15 MG/1
15 TABLET, EXTENDED RELEASE ORAL DAILY
Qty: 30 TABLET | Refills: 11 | Status: SHIPPED | OUTPATIENT
Start: 2024-10-25 | End: 2025-10-25

## 2024-10-25 NOTE — TELEPHONE ENCOUNTER
----- Message from Ephraim sent at 10/25/2024 12:57 PM CDT -----  Contact: Patient  Jessi Linton  MRN: [id number]  : 1965  PCP: David Allred  Home Phone      [phone]  Work Phone      Not on file.  Mobile          [phone]      MESSAGE:   Pt requesting refill or new Rx.   Is this a refill or new RX:  refills   RX name and strength: Oxybutynin 5 mg                                        Oxybutynin 15 mg                                        Lancets                                         needles  Last office visit: 24  Is this a 30-day or 90-day RX:  ???  [facility]  Comments:      Phone:  [phone]    PCP: Brigida

## 2024-10-25 NOTE — TELEPHONE ENCOUNTER
"Pt requesting medication refill. LOV: 24 [facility]: [facility] [place] [facility]  Requested Prescriptions     Pending Prescriptions Disp Refills    oxybutynin (DITROPAN-XL) 5 MG TR24       Sig: Take 1 tablet (5 mg total) by mouth.    pen needle, diabetic 32 gauge x 32" Ndle 30 each 11     Si Device by Misc.(Non-Drug; Combo Route) route once a week.    lancets Misc 100 each 1     Sig: Use one lancet per lancing device once a day Dx: E11.9    oxybutynin (DITROPAN XL) 15 MG TR24 30 tablet 11     Sig: Take 1 tablet (15 mg total) by mouth once daily.       "

## 2024-10-25 NOTE — TELEPHONE ENCOUNTER
No care due was identified.  [facility] Embedded Care Due Messages. Reference number: [id number].   10/25/2024 2:07:29 PM CDT

## 2024-11-01 ENCOUNTER — TELEPHONE (OUTPATIENT)
Dept: FAMILY MEDICINE | Facility: CLINIC | Age: 59
End: 2024-11-01
Payer: MEDICAID

## 2024-11-01 DIAGNOSIS — E11.9 TYPE 2 DIABETES MELLITUS WITHOUT COMPLICATION, WITHOUT LONG-TERM CURRENT USE OF INSULIN: ICD-10-CM

## 2024-11-01 NOTE — TELEPHONE ENCOUNTER
Pt requesting medication refill. LOV:  08/08/24  [facility] [place]   Requested Prescriptions     Pending Prescriptions Disp Refills    semaglutide (OZEMPIC) 1 mg/dose (4 mg/3 mL) 3 mL 11     Sig: Inject 1 mg into the skin every 7 days.    blood sugar diagnostic (ONETOUCH ULTRA TEST) Strp 100 strip 1     Sig: TEST 3 TIMES A DAY

## 2024-11-01 NOTE — TELEPHONE ENCOUNTER
Care Due:                  Date            Visit Type   Department     Provider  --------------------------------------------------------------------------------                                EP -                              [facility]  Last Visit: 08-      CARE ([facility])   CHRISTINA Allred                              EP -                              PRIMARY      [facility]  Next Visit: 02-      CARE ([facility])   CHRISTINA Allred                                                            Last  Test          Frequency    Reason                     Performed    Due Date  --------------------------------------------------------------------------------    HBA1C.......  6 months...  metFORMIN, semaglutide...  08- 01-    [facility] Embedded Care Due Messages. Reference number: [id number].   11/01/2024 11:28:19 AM CDT

## 2024-11-01 NOTE — TELEPHONE ENCOUNTER
----- Message from Ephraim sent at 2024 11:15 AM CDT -----  Contact: Patient  Jessi Linton  MRN: [id number]  : 1965  PCP: David Allred  Home Phone      [phone]  Work Phone      Not on file.  Mobile          [phone]      MESSAGE:   Pt requesting refill or new Rx.   Is this a refill or new RX:  refill  RX name and strength: test strips (One Touch Delica)   Last office visit: 24  Is this a 30-day or 90-day RX:  ???  [facility] [place]  Comments: has 2 pens of Ozempic left -- requesting that be sent also, if possible     Phone:  [phone]    PCP: Brigida

## 2024-11-03 RX ORDER — SEMAGLUTIDE 1.34 MG/ML
1 INJECTION, SOLUTION SUBCUTANEOUS
Qty: 3 ML | Refills: 11 | Status: SHIPPED | OUTPATIENT
Start: 2024-11-03 | End: 2025-11-03

## 2024-11-25 DIAGNOSIS — E89.40 SURGICAL MENOPAUSE: ICD-10-CM

## 2024-11-25 NOTE — TELEPHONE ENCOUNTER
Pt requesting medication refill. LOV: 08/08/24   [id number] [place], [place] - [address]  Requested Prescriptions     Pending Prescriptions Disp Refills    estrogens, conjugated, (PREMARIN) 0.3 MG tablet 30 tablet 5     Sig: Take 1 tablet (0.3 mg total) by mouth once daily.

## 2024-11-25 NOTE — TELEPHONE ENCOUNTER
No care due was identified.  [facility] Embedded Care Due Messages. Reference number: [id number].   11/25/2024 11:00:03 AM CST

## 2024-11-25 NOTE — TELEPHONE ENCOUNTER
----- Message from Jordi sent at 2024 10:48 AM CST -----  Contact: pt  Jessi Linton  MRN: [id number]  : 1965  PCP: David Allred  Home Phone      [phone]  Work Phone      Not on file.  Mobile          [phone]      MESSAGE:     Pt called requesting refill of medication estrogens, conjugated, (PREMARIN) 0.3 MG tablet. Medication would be sent to [facility]/pharmacy #[id number] - [place], [place] - [address]          Please advise  [phone]

## 2024-12-01 DIAGNOSIS — R60.9 EDEMA, UNSPECIFIED TYPE: ICD-10-CM

## 2024-12-02 RX ORDER — FUROSEMIDE 20 MG/1
20 TABLET ORAL
Qty: 90 TABLET | Refills: 3 | Status: SHIPPED | OUTPATIENT
Start: 2024-12-02

## 2024-12-02 NOTE — TELEPHONE ENCOUNTER
LOV 08/08/2024      Patient requesting refill for   furosemide (LASIX) 20 MG tablet 90 tablet 3 11/15/2023         Rx pending   Pharmacy confirmed  Please advise

## 2024-12-05 LAB
LEFT EYE DM RETINOPATHY: POSITIVE
RIGHT EYE DM RETINOPATHY: NEGATIVE

## 2024-12-17 ENCOUNTER — PATIENT OUTREACH (OUTPATIENT)
Dept: ADMINISTRATIVE | Facility: HOSPITAL | Age: 59
End: 2024-12-17
Payer: MEDICAID

## 2024-12-17 NOTE — PROGRESS NOTES
Uploaded and linked outside DM Eye Exam into [employer] [facility]

## 2025-01-09 ENCOUNTER — TELEPHONE (OUTPATIENT)
Dept: FAMILY MEDICINE | Facility: CLINIC | Age: 60
End: 2025-01-09

## 2025-01-09 NOTE — TELEPHONE ENCOUNTER
----- Message from Jordi sent at 2025  9:57 AM CST -----  Contact: Catie  Jessi Linton  MRN: [id number]  : 1965  PCP: David Allred  Home Phone      [phone]  Work Phone      Not on file.  Mobile          [phone]      MESSAGE:     Catie Huerta called wanting to speak with nurse about needing pts diagnostic sleep study faxed over to them. Fax # [phone] Gilberto Haddad             Please advise  Catie  [phone]  Ext  [phone]

## 2025-01-13 DIAGNOSIS — E11.9 TYPE 2 DIABETES MELLITUS WITHOUT COMPLICATION, WITHOUT LONG-TERM CURRENT USE OF INSULIN: ICD-10-CM

## 2025-01-13 DIAGNOSIS — G43.009 MIGRAINE WITHOUT AURA AND WITHOUT STATUS MIGRAINOSUS, NOT INTRACTABLE: ICD-10-CM

## 2025-01-13 RX ORDER — METFORMIN HYDROCHLORIDE 500 MG/1
500 TABLET ORAL
Qty: 30 TABLET | Refills: 5 | Status: SHIPPED | OUTPATIENT
Start: 2025-01-13 | End: 2026-01-13

## 2025-01-13 RX ORDER — PROPRANOLOL HYDROCHLORIDE 20 MG/1
20 TABLET ORAL 2 TIMES DAILY
Qty: 60 TABLET | Refills: 5 | Status: SHIPPED | OUTPATIENT
Start: 2025-01-13

## 2025-01-13 RX ORDER — ATORVASTATIN CALCIUM 10 MG/1
10 TABLET, FILM COATED ORAL DAILY
Qty: 30 TABLET | Refills: 5 | Status: SHIPPED | OUTPATIENT
Start: 2025-01-13 | End: 2026-01-13

## 2025-01-13 NOTE — TELEPHONE ENCOUNTER
Pt requesting medication refill.   LOV:  08/08/24                Pharm: [facility]/pharmacy #[id number] - [place] [id number]  Med pended. Thanks    Requested Prescriptions     Pending Prescriptions Disp Refills    atorvastatin (LIPITOR) 10 MG tablet 30 tablet 5     Sig: Take 1 tablet (10 mg total) by mouth once daily.    propranoloL (INDERAL) 20 MG tablet 60 tablet 5     Sig: Take 1 tablet (20 mg total) by mouth 2 (two) times daily.    metFORMIN (GLUCOPHAGE) 500 MG tablet 30 tablet 5     Sig: Take 1 tablet (500 mg total) by mouth daily with breakfast.

## 2025-01-13 NOTE — TELEPHONE ENCOUNTER
Care Due:                  Date            Visit Type   Department     Provider  --------------------------------------------------------------------------------                                EP -                              [facility]  Last Visit: 08-      CARE ([facility])   CHRISTINA Allred                              EP -                              PRIMARY      [facility]  Next Visit: 02-      CARE ([facility])   CHRISTINA Allred                                                            Last  Test          Frequency    Reason                     Performed    Due Date  --------------------------------------------------------------------------------    HBA1C.......  6 months...  metFORMIN, semaglutide...  08- 01-    [facility] Embedded Care Due Messages. Reference number: [id number].   1/13/2025 9:06:11 AM CST

## 2025-01-13 NOTE — TELEPHONE ENCOUNTER
----- Message from Jordi sent at 2025  8:39 AM CST -----  Contact: pt  Jessi Linton  MRN: [id number]  : 1965  PCP: David Allred  Home Phone      [phone]  Work Phone      Not on file.  Mobile          [phone]      MESSAGE:     Pt called requesting refills of medications atorvastatin (LIPITOR) 10 MG tablet, metFORMIN (GLUCOPHAGE) 500 MG tablet, and propranoloL (INDERAL) 20 MG tablet. Medications would be sent to [facility]/pharmacy #[id number] - [place] - [address]          Please advise  [phone]

## 2025-01-15 ENCOUNTER — TELEPHONE (OUTPATIENT)
Dept: FAMILY MEDICINE | Facility: CLINIC | Age: 60
End: 2025-01-15
Payer: MEDICAID

## 2025-01-15 NOTE — TELEPHONE ENCOUNTER
----- Message from [facility] sent at 1/15/2025 12:02 PM CST -----  Contact: miriMis eitan  Jessi Linton  MRN: [id number]  : 1965  PCP: David Allred  Home Phone      [phone]  Work Phone      Not on file.  Mobile          [phone]      MESSAGE: Miri called and stated that the insurance is requesting a copy of her sleep study to cover any of her supply.     Fax: [phone]  Eitan from Miri: [phone] ext [phone]

## 2025-01-15 NOTE — TELEPHONE ENCOUNTER
Attempted to contact pt to determine place sleep study was completed.  No voicemail set up.     No recent sleep study seen in [facility] under procedure or media.

## 2025-01-16 NOTE — TELEPHONE ENCOUNTER
Received callback from pt. States Awanjosé miguel has received sleep study and she is to received cpap supplies next week.

## 2025-01-27 ENCOUNTER — TELEPHONE (OUTPATIENT)
Dept: FAMILY MEDICINE | Facility: CLINIC | Age: 60
End: 2025-01-27
Payer: MEDICAID

## 2025-01-27 DIAGNOSIS — E11.9 TYPE 2 DIABETES MELLITUS WITHOUT COMPLICATION, WITHOUT LONG-TERM CURRENT USE OF INSULIN: Primary | ICD-10-CM

## 2025-01-29 DIAGNOSIS — E11.9 TYPE 2 DIABETES MELLITUS WITHOUT COMPLICATION: ICD-10-CM

## 2025-02-03 ENCOUNTER — CLINICAL SUPPORT (OUTPATIENT)
Dept: FAMILY MEDICINE | Facility: CLINIC | Age: 60
End: 2025-02-03
Payer: MEDICAID

## 2025-02-03 DIAGNOSIS — E11.9 TYPE 2 DIABETES MELLITUS WITHOUT COMPLICATION, WITHOUT LONG-TERM CURRENT USE OF INSULIN: ICD-10-CM

## 2025-02-03 DIAGNOSIS — N32.81 OAB (OVERACTIVE BLADDER): ICD-10-CM

## 2025-02-03 DIAGNOSIS — E11.9 TYPE 2 DIABETES MELLITUS WITHOUT COMPLICATION: ICD-10-CM

## 2025-02-03 LAB
ALBUMIN SERPL BCP-MCNC: 3.5 G/DL (ref 3.5–5.2)
ALP SERPL-CCNC: 72 U/L (ref 40–150)
ALT SERPL W/O P-5'-P-CCNC: 21 U/L (ref 10–44)
ANION GAP SERPL CALC-SCNC: 9 MMOL/L (ref 8–16)
AST SERPL-CCNC: 16 U/L (ref 10–40)
BILIRUB SERPL-MCNC: 0.5 MG/DL (ref 0.1–1)
BUN SERPL-MCNC: 26 MG/DL (ref 6–20)
CALCIUM SERPL-MCNC: 9.1 MG/DL (ref 8.7–10.5)
CHLORIDE SERPL-SCNC: 105 MMOL/L (ref 95–110)
CO2 SERPL-SCNC: 28 MMOL/L (ref 23–29)
CREAT SERPL-MCNC: 0.8 MG/DL (ref 0.5–1.4)
EST. GFR  (NO RACE VARIABLE): >60 ML/MIN/1.73 M^2
ESTIMATED AVG GLUCOSE: 120 MG/DL (ref 68–131)
GLUCOSE SERPL-MCNC: 99 MG/DL (ref 70–110)
HBA1C MFR BLD: 5.8 % (ref 4–5.6)
POTASSIUM SERPL-SCNC: 4.5 MMOL/L (ref 3.5–5.1)
PROT SERPL-MCNC: 7.4 G/DL (ref 6–8.4)
SODIUM SERPL-SCNC: 142 MMOL/L (ref 136–145)

## 2025-02-03 PROCEDURE — 99999PBSHW PR PBB SHADOW TECHNICAL ONLY FILED TO HB: Mod: PBBFAC,,,

## 2025-02-03 PROCEDURE — 36415 COLL VENOUS BLD VENIPUNCTURE: CPT | Mod: PBBFAC

## 2025-02-03 PROCEDURE — 83036 HEMOGLOBIN GLYCOSYLATED A1C: CPT | Performed by: FAMILY MEDICINE

## 2025-02-03 PROCEDURE — 80053 COMPREHEN METABOLIC PANEL: CPT | Performed by: FAMILY MEDICINE

## 2025-02-03 RX ORDER — OXYBUTYNIN CHLORIDE 15 MG/1
15 TABLET, EXTENDED RELEASE ORAL DAILY
Qty: 30 TABLET | Refills: 11 | Status: SHIPPED | OUTPATIENT
Start: 2025-02-03 | End: 2026-02-03

## 2025-02-03 RX ORDER — MONTELUKAST SODIUM 10 MG/1
10 TABLET ORAL NIGHTLY
Qty: 90 TABLET | Refills: 3 | Status: SHIPPED | OUTPATIENT
Start: 2025-02-03

## 2025-02-03 NOTE — TELEPHONE ENCOUNTER
No care due was identified.  [facility] Embedded Care Due Messages. Reference number: [id number].   2/03/2025 9:55:45 AM CST

## 2025-02-03 NOTE — TELEPHONE ENCOUNTER
----- Message from Jordi sent at 2/3/2025  7:55 AM CST -----  Contact: pt  Jessi Linton  MRN: [id number]  : 1965  PCP: David Allred  Home Phone      [phone]  Work Phone      Not on file.  Mobile          [phone]      MESSAGE:     Pt came into clinic requesting refill of medication oxybutynin (DITROPAN-XL) 5 MG TR24, and montelukast (SINGULAIR) 10 mg tablet. Medications would be sent to [facility]/pharmacy #[id number] - [place], [place] - [address]              Please advise  [phone]

## 2025-02-05 DIAGNOSIS — E11.9 TYPE 2 DIABETES MELLITUS WITHOUT COMPLICATION: ICD-10-CM

## 2025-02-06 RX ORDER — OXYBUTYNIN CHLORIDE 5 MG/1
5 TABLET, EXTENDED RELEASE ORAL DAILY
Qty: 90 TABLET | Refills: 0 | Status: SHIPPED | OUTPATIENT
Start: 2025-02-06 | End: 2025-05-07

## 2025-02-06 NOTE — TELEPHONE ENCOUNTER
----- Message from [facility] sent at 2025  9:11 AM CST -----  Contact: patient  Jessi Linton  MRN: [id number]  : 1965  PCP: David Allred  Home Phone      [phone]  Work Phone      Not on file.  Mobile          [phone]      MESSAGE:   Pt requesting refill or new Rx.   Is this a refill or new RX:  refill  RX name and strength: oxybutynin (DITROPAN XL) 15 MG TR24   Last office visit: 2024  Is this a 30-day or 90-day RX:  30 day rx  Pharmacy name and location:  [facility]/pharmacy #[id number]  [place] [address]   Comments:  rx calls for 15mg but patient is requesting 5mg    Phone:  [phone]

## 2025-02-06 NOTE — TELEPHONE ENCOUNTER
Pt requesting medication refill.   LOV:   08/08/24                    Pharm: oxybutynin (DITROPAN XL) 5 MG TR24   Med pended. Thanks  Requested Prescriptions     Pending Prescriptions Disp Refills    oxybutynin (DITROPAN-XL) 5 MG TR24 90 tablet 0     Sig: Take 1 tablet (5 mg total) by mouth once daily.

## 2025-02-06 NOTE — TELEPHONE ENCOUNTER
No care due was identified.  [facility] Embedded Care Due Messages. Reference number: [id number].   2/06/2025 9:54:01 AM CST

## 2025-02-12 DIAGNOSIS — Z12.31 OTHER SCREENING MAMMOGRAM: ICD-10-CM

## 2025-02-13 ENCOUNTER — OFFICE VISIT (OUTPATIENT)
Dept: FAMILY MEDICINE | Facility: CLINIC | Age: 60
End: 2025-02-13
Payer: MEDICAID

## 2025-02-13 ENCOUNTER — CLINICAL SUPPORT (OUTPATIENT)
Dept: FAMILY MEDICINE | Facility: CLINIC | Age: 60
End: 2025-02-13
Payer: MEDICAID

## 2025-02-13 VITALS
HEART RATE: 76 BPM | HEIGHT: 55 IN | RESPIRATION RATE: 17 BRPM | DIASTOLIC BLOOD PRESSURE: 82 MMHG | WEIGHT: 225 LBS | SYSTOLIC BLOOD PRESSURE: 116 MMHG | BODY MASS INDEX: 52.07 KG/M2 | OXYGEN SATURATION: 96 %

## 2025-02-13 DIAGNOSIS — E11.9 TYPE 2 DIABETES MELLITUS WITHOUT COMPLICATION, WITHOUT LONG-TERM CURRENT USE OF INSULIN: ICD-10-CM

## 2025-02-13 DIAGNOSIS — Z23 NEED FOR VACCINATION: ICD-10-CM

## 2025-02-13 DIAGNOSIS — Z12.11 SCREEN FOR COLON CANCER: ICD-10-CM

## 2025-02-13 DIAGNOSIS — Z86.0100 HISTORY OF COLON POLYPS: ICD-10-CM

## 2025-02-13 DIAGNOSIS — R60.9 DEPENDENT EDEMA: Primary | ICD-10-CM

## 2025-02-13 DIAGNOSIS — G47.33 OSA (OBSTRUCTIVE SLEEP APNEA): ICD-10-CM

## 2025-02-13 DIAGNOSIS — E11.69 HYPERLIPIDEMIA ASSOCIATED WITH TYPE 2 DIABETES MELLITUS: ICD-10-CM

## 2025-02-13 DIAGNOSIS — E11.9 TYPE 2 DIABETES MELLITUS WITHOUT COMPLICATION: ICD-10-CM

## 2025-02-13 DIAGNOSIS — E66.01 MORBID OBESITY: ICD-10-CM

## 2025-02-13 DIAGNOSIS — I10 ESSENTIAL HYPERTENSION: ICD-10-CM

## 2025-02-13 DIAGNOSIS — E78.5 HYPERLIPIDEMIA ASSOCIATED WITH TYPE 2 DIABETES MELLITUS: ICD-10-CM

## 2025-02-13 PROBLEM — R07.89 ATYPICAL CHEST PAIN: Status: RESOLVED | Noted: 2023-08-07 | Resolved: 2025-02-13

## 2025-02-13 PROBLEM — M79.602 ARM PAIN, ANTERIOR, LEFT: Status: RESOLVED | Noted: 2023-03-02 | Resolved: 2025-02-13

## 2025-02-13 PROBLEM — M25.622 DECREASED RANGE OF MOTION OF ELBOW, LEFT: Status: RESOLVED | Noted: 2023-03-02 | Resolved: 2025-02-13

## 2025-02-13 PROBLEM — M79.89 LEG SWELLING: Status: RESOLVED | Noted: 2023-12-11 | Resolved: 2025-02-13

## 2025-02-13 PROBLEM — R29.898 DECREASED GRIP STRENGTH OF LEFT HAND: Status: RESOLVED | Noted: 2023-03-02 | Resolved: 2025-02-13

## 2025-02-13 PROBLEM — M25.612 DECREASED RANGE OF MOTION OF LEFT SHOULDER: Status: RESOLVED | Noted: 2023-03-02 | Resolved: 2025-02-13

## 2025-02-13 LAB
ALBUMIN/CREAT UR: NORMAL UG/MG (ref 0–30)
CREAT UR-MCNC: 25.9 MG/DL (ref 15–325)
MICROALBUMIN UR DL<=1MG/L-MCNC: <2.5 UG/ML

## 2025-02-13 PROCEDURE — 99214 OFFICE O/P EST MOD 30 MIN: CPT | Mod: PBBFAC | Performed by: FAMILY MEDICINE

## 2025-02-13 PROCEDURE — 1159F MED LIST DOCD IN RCRD: CPT | Mod: CPTII,,, | Performed by: FAMILY MEDICINE

## 2025-02-13 PROCEDURE — 3008F BODY MASS INDEX DOCD: CPT | Mod: CPTII,,, | Performed by: FAMILY MEDICINE

## 2025-02-13 PROCEDURE — 99999 PR PBB SHADOW E&M-EST. PATIENT-LVL IV: CPT | Mod: PBBFAC,,, | Performed by: FAMILY MEDICINE

## 2025-02-13 PROCEDURE — 1160F RVW MEDS BY RX/DR IN RCRD: CPT | Mod: CPTII,,, | Performed by: FAMILY MEDICINE

## 2025-02-13 PROCEDURE — 3079F DIAST BP 80-89 MM HG: CPT | Mod: CPTII,,, | Performed by: FAMILY MEDICINE

## 2025-02-13 PROCEDURE — G2211 COMPLEX E/M VISIT ADD ON: HCPCS | Mod: S$PBB,,, | Performed by: FAMILY MEDICINE

## 2025-02-13 PROCEDURE — 4010F ACE/ARB THERAPY RXD/TAKEN: CPT | Mod: CPTII,,, | Performed by: FAMILY MEDICINE

## 2025-02-13 PROCEDURE — 99999PBSHW INFLUENZA - TRIVALENT - PF (ADULT): Mod: PBBFAC,,,

## 2025-02-13 PROCEDURE — 90471 IMMUNIZATION ADMIN: CPT | Mod: PBBFAC

## 2025-02-13 PROCEDURE — 3044F HG A1C LEVEL LT 7.0%: CPT | Mod: CPTII,,, | Performed by: FAMILY MEDICINE

## 2025-02-13 PROCEDURE — 3074F SYST BP LT 130 MM HG: CPT | Mod: CPTII,,, | Performed by: FAMILY MEDICINE

## 2025-02-13 PROCEDURE — 82043 UR ALBUMIN QUANTITATIVE: CPT | Performed by: FAMILY MEDICINE

## 2025-02-13 PROCEDURE — 99214 OFFICE O/P EST MOD 30 MIN: CPT | Mod: S$PBB,,, | Performed by: FAMILY MEDICINE

## 2025-02-13 RX ORDER — SEMAGLUTIDE 2.68 MG/ML
2 INJECTION, SOLUTION SUBCUTANEOUS
Qty: 3 ML | Refills: 11 | Status: SHIPPED | OUTPATIENT
Start: 2025-02-13 | End: 2026-02-13

## 2025-02-13 NOTE — PROGRESS NOTES
Subjective:       Patient ID: Jessi Linton is a 59 y.o. female.    Chief Complaint: Follow-up (Pt here for 6 mth f/u. )    Pt is a 59 y.o. female who presents for evaluation and management of   Encounter Diagnoses   Name Primary?    Dependent edema Yes    Essential hypertension     Hyperlipidemia associated with type 2 diabetes mellitus     Morbid obesity     MAME (obstructive sleep apnea)     Type 2 diabetes mellitus without complication, without long-term current use of insulin     History of colon polyps    .  Doing well on current meds. Denies any side effects. Prevention is up to date.  Review of Systems   Constitutional:  Negative for chills and fever.   Respiratory:  Negative for shortness of breath.    Cardiovascular:  Negative for chest pain and palpitations.   Gastrointestinal:  Negative for abdominal pain, blood in stool, constipation and nausea.   Genitourinary:  Negative for difficulty urinating.   Psychiatric/Behavioral:  Negative for dysphoric mood, sleep disturbance and suicidal ideas. The patient is not nervous/anxious.        Objective:      Physical Exam  Constitutional:       Appearance: She is well-developed. She is obese.   HENT:      Head: Normocephalic and atraumatic.      Right Ear: External ear normal.      Left Ear: External ear normal.      Nose: Nose normal.   Eyes:      Pupils: Pupils are equal, round, and reactive to light.   Neck:      Thyroid: No thyromegaly.      Vascular: No JVD.      Trachea: No tracheal deviation.   Cardiovascular:      Rate and Rhythm: Normal rate.      Heart sounds: Normal heart sounds. No murmur heard.  Pulmonary:      Effort: Pulmonary effort is normal. No respiratory distress.      Breath sounds: Normal breath sounds. No wheezing or rales.   Chest:      Chest wall: No tenderness.   Abdominal:      General: Bowel sounds are normal. There is no distension.      Palpations: Abdomen is soft. There is no mass.      Tenderness: There is no abdominal  tenderness. There is no guarding or rebound.   Musculoskeletal:         General: No tenderness. Normal range of motion.      Cervical back: Normal range of motion and neck supple.      Comments: Protective Sensation (w/ 10 gram monofilament):  Right: Absent  Left: Absent    Visual Inspection:  Callus -  Bilateral and Onychomycosis -  Bilateral    Pedal Pulses:   Right: Present  Left: Present    Posterior Tibialis Pulses:   Right:Present  Left: Present       Lymphadenopathy:      Cervical: No cervical adenopathy.   Skin:     General: Skin is warm and dry.      Coloration: Skin is not pale.      Findings: No erythema or rash.   Neurological:      Mental Status: She is alert and oriented to person, place, and time.      Cranial Nerves: No cranial nerve deficit.      Motor: No abnormal muscle tone.      Coordination: Coordination normal.      Deep Tendon Reflexes: Reflexes are normal and symmetric. Reflexes normal.   Psychiatric:         Behavior: Behavior normal.         Thought Content: Thought content normal.         Judgment: Judgment normal.         Assessment:       1. Dependent edema    2. Essential hypertension    3. Hyperlipidemia associated with type 2 diabetes mellitus    4. Morbid obesity    5. MAME (obstructive sleep apnea)    6. Type 2 diabetes mellitus without complication, without long-term current use of insulin    7. History of colon polyps        Plan:   1. Dependent edema  Overview:  Lose weight   Compression stockings       2. Essential hypertension  Overview:  Propranolol   Losartan ---increase to 100mg 8/8/24           Orders:  -     CBC Auto Differential; Future; Expected date: 08/14/2025  -     Comprehensive Metabolic Panel; Future; Expected date: 08/14/2025  -     Lipid Panel; Future; Expected date: 08/14/2025  -     TSH; Future; Expected date: 08/14/2025  -     CBC Auto Differential; Future; Expected date: 08/14/2025  -     Comprehensive Metabolic Panel; Future; Expected date: 08/14/2025  -      Lipid Panel; Future; Expected date: 08/14/2025  -     TSH; Future; Expected date: 08/14/2025    3. Hyperlipidemia associated with type 2 diabetes mellitus  Overview:  Lab Results   Component Value Date    LDLCALC 61.6 (L) 07/31/2024     Lipitor     Orders:  -     Comprehensive Metabolic Panel; Future; Expected date: 08/14/2025  -     Lipid Panel; Future; Expected date: 08/14/2025  -     Comprehensive Metabolic Panel; Future; Expected date: 08/14/2025  -     Hemoglobin A1C; Future; Expected date: 08/14/2025  -     Lipid Panel; Future; Expected date: 08/14/2025  -     TSH; Future; Expected date: 08/14/2025    4. Morbid obesity  Overview:  The patient is asked to make an attempt to improve diet and exercise patterns to aid in medical management of this problem.  Cut out white carbs: bread, rice, pasta, potatoes. Exercise/walk 5x/week for at least 30 minutes    Ozempic     .        5. MAME (obstructive sleep apnea)  Overview:  Compliant with cpap   benefitting from cpap       6. Type 2 diabetes mellitus without complication, without long-term current use of insulin  Overview:  Lab Results   Component Value Date    HGBA1C 5.8 (H) 02/03/2025     The patient is asked to make an attempt to improve diet and exercise patterns to aid in medical management of this problem.  Cut out white carbs: bread, rice, pasta, potatoes. Exercise/walk 5x/week for at least 30 minutes  .  Ozemic 1mg       Orders:  -     Comprehensive Metabolic Panel; Future; Expected date: 08/14/2025  -     Hemoglobin A1C; Future; Expected date: 08/14/2025  -     Lipid Panel; Future; Expected date: 08/14/2025  -     semaglutide (OZEMPIC) 2 mg/dose (8 mg/3 mL) PnIj; Inject 2 mg into the skin every 7 days.  Dispense: 3 mL; Refill: 11  -     Hemoglobin A1C; Future; Expected date: 08/14/2025  -     Microalbumin/creatinine urine ratio    7. History of colon polyps  -     Case Request Endoscopy: COLONOSCOPY, COLONOSCOPY, SCREENING, HIGH RISK PATIENT      No  follow-ups on file.

## 2025-02-24 DIAGNOSIS — E11.9 TYPE 2 DIABETES MELLITUS WITHOUT COMPLICATION, WITHOUT LONG-TERM CURRENT USE OF INSULIN: ICD-10-CM

## 2025-02-24 RX ORDER — SEMAGLUTIDE 2.68 MG/ML
2 INJECTION, SOLUTION SUBCUTANEOUS
Qty: 3 ML | Refills: 11 | Status: SHIPPED | OUTPATIENT
Start: 2025-02-24 | End: 2026-02-24

## 2025-02-24 RX ORDER — LOSARTAN POTASSIUM 100 MG/1
100 TABLET ORAL DAILY
Qty: 30 TABLET | Refills: 5 | Status: SHIPPED | OUTPATIENT
Start: 2025-02-24 | End: 2026-02-24

## 2025-02-24 NOTE — TELEPHONE ENCOUNTER
----- Message from [facility] sent at 2/24/2025 10:15 AM CST -----  Contact: patient  Jessi BaezDeRN: [id number]: 1965PCP: David AllredHome Phone      [phone] Phone      Not on file.Mobile          [phone]: Pt requesting refill or new Rx. Is this a refill or new RX:  refill RX name and strength: losartan (COZAAR) 100 MG tablet semaglutide (OZEMPIC) 2 mg/dose (8 mg/3 mL) PnIj Last office visit: 02/13/2025 Is this a 30-day or 90-day RX:  30 day rxPharmacy name and location:  [facility]/pharmacy #[id number] - [place] [facility] Comments:  Phone:  [phone]

## 2025-02-24 NOTE — TELEPHONE ENCOUNTER
No care due was identified.  [facility] Embedded Care Due Messages. Reference number: [id number].   2/24/2025 10:31:08 AM CST

## 2025-02-24 NOTE — TELEPHONE ENCOUNTER
Pt requesting medication refill.   LOV:   02/13/25                     [id number] pended. Thanks  Requested Prescriptions     Pending Prescriptions Disp Refills    semaglutide (OZEMPIC) 2 mg/dose (8 mg/3 mL) PnIj 3 mL 11     Sig: Inject 2 mg into the skin every 7 days.    losartan (COZAAR) 100 MG tablet 30 tablet 5     Sig: Take 1 tablet (100 mg total) by mouth once daily.

## 2025-03-03 ENCOUNTER — TELEPHONE (OUTPATIENT)
Dept: FAMILY MEDICINE | Facility: CLINIC | Age: 60
End: 2025-03-03
Payer: MEDICAID

## 2025-03-03 NOTE — TELEPHONE ENCOUNTER
Contacted/spoke to [facility] to check status of pt's Ozempic 2 mg/dose.Pharmacy states they have spoken to pt and pt's mother (Kassandra). Pt refilled Ozempic 1 mg and was advised by pharmacy to bring medication back, pharmacy will then dispense Ozempic 2 mg.    Contacted/spoke to pt's mother (Kassandra) and notified of status. Pt gave understanding.

## 2025-03-03 NOTE — TELEPHONE ENCOUNTER
----- Message from Jordi sent at 3/3/2025  3:25 PM CST -----  Contact: Mother  Jessi BaezDeRN: [id number]: 1965PCP: David AllredHome Phone      [phone] Phone      Not on file.Mobile          [phone]: Mother called wanting to speak with nurse about pts medication semaglutide (OZEMPIC) 2 mg/dose (8 mg/3 mL) PnIj. Mother wants to know if she is supposed to be on this dosage of medication.Please [phone]

## 2025-04-15 ENCOUNTER — TELEPHONE (OUTPATIENT)
Dept: FAMILY MEDICINE | Facility: CLINIC | Age: 60
End: 2025-04-15
Payer: MEDICAID

## 2025-04-15 NOTE — TELEPHONE ENCOUNTER
"Contacted/spoke to pt to determine where last sleep study was completed due to no recent results seen in pt's chart or under "media". Pt states she does not recall as study was done more than 10 years ago. Pt also stated she did not request cpap supplies from [facility].       Contacted Catie w/[facility] and notified of above. Catie states she will discuss above w/her billing dept due to pt receiving Cpap supplies in November 2024 by [facility] that was not covered under pt's insurance.     "

## 2025-04-15 NOTE — TELEPHONE ENCOUNTER
----- Message from Ephraim sent at 4/15/2025  1:49 PM CDT -----  Contact: Catie @ [facility]  Jessi BaezSherin: [id number]: 1965PCP: David AllredHome Phone      [phone] Phone      Not on file.Mobile          [phone]    MESSAGE: [facility] -- received order for c-pap supplies -- requesting copy of sleep study -- fax # [phone] Catie @ [phone] qsl [phone]: Brigida

## 2025-05-01 ENCOUNTER — TELEPHONE (OUTPATIENT)
Dept: FAMILY MEDICINE | Facility: CLINIC | Age: 60
End: 2025-05-01
Payer: MEDICAID

## 2025-05-01 DIAGNOSIS — G47.33 OSA (OBSTRUCTIVE SLEEP APNEA): Primary | ICD-10-CM

## 2025-05-01 NOTE — TELEPHONE ENCOUNTER
----- Message from Ephraim sent at 5/1/2025 12:31 PM CDT -----  Contact: Patient  Jessi BaezDeRN: [id number]: 1965PCP: David AllredHome Phone      [phone] Phone      Not on file.Mobile          [phone]    MESSAGE: needs C-pap supplies - requesting to speak with nurseCall [phone]: Brigida

## 2025-05-02 ENCOUNTER — OFFICE VISIT (OUTPATIENT)
Dept: FAMILY MEDICINE | Facility: CLINIC | Age: 60
End: 2025-05-02
Payer: MEDICAID

## 2025-05-02 VITALS
RESPIRATION RATE: 16 BRPM | BODY MASS INDEX: 52.07 KG/M2 | DIASTOLIC BLOOD PRESSURE: 80 MMHG | OXYGEN SATURATION: 99 % | HEIGHT: 55 IN | WEIGHT: 225 LBS | HEART RATE: 78 BPM | SYSTOLIC BLOOD PRESSURE: 118 MMHG

## 2025-05-02 DIAGNOSIS — L01.00 IMPETIGO: Primary | ICD-10-CM

## 2025-05-02 PROCEDURE — 99215 OFFICE O/P EST HI 40 MIN: CPT | Mod: PBBFAC | Performed by: NURSE PRACTITIONER

## 2025-05-02 PROCEDURE — 99999 PR PBB SHADOW E&M-EST. PATIENT-LVL V: CPT | Mod: PBBFAC,,, | Performed by: NURSE PRACTITIONER

## 2025-05-02 RX ORDER — SEMAGLUTIDE 1.34 MG/ML
1 INJECTION, SOLUTION SUBCUTANEOUS
COMMUNITY
Start: 2025-03-01

## 2025-05-02 RX ORDER — CEPHALEXIN 500 MG/1
500 CAPSULE ORAL EVERY 12 HOURS
Qty: 20 CAPSULE | Refills: 0 | Status: SHIPPED | OUTPATIENT
Start: 2025-05-02 | End: 2025-05-12

## 2025-05-02 RX ORDER — MUPIROCIN 20 MG/G
OINTMENT TOPICAL 2 TIMES DAILY
Qty: 22 G | Refills: 1 | Status: SHIPPED | OUTPATIENT
Start: 2025-05-02

## 2025-05-02 NOTE — PROGRESS NOTES
Subjective:       Patient ID: Jessi Linton is a 59 y.o. female.    Chief Complaint: Sore (Patient has sore on right cheek x 2 weeks. )    Lesion at the right cheek that started last week. Has spread to the area at right side of the nose. One small pustule at the right side of nose.      Review of Systems   Constitutional: Negative.  Negative for appetite change, fatigue and fever.   HENT: Negative.  Negative for congestion, ear pain and sore throat.    Eyes: Negative.  Negative for visual disturbance.   Respiratory: Negative.  Negative for cough, shortness of breath and wheezing.    Cardiovascular: Negative.    Gastrointestinal: Negative.  Negative for abdominal pain, diarrhea, nausea and vomiting.   Endocrine: Negative.    Genitourinary: Negative.  Negative for difficulty urinating and urgency.   Musculoskeletal: Negative.  Negative for arthralgias and myalgias.   Skin: Negative.  Negative for color change.        As per HPI   Allergic/Immunologic: Negative.    Neurological: Negative.  Negative for dizziness and headaches.   Hematological: Negative.    Psychiatric/Behavioral: Negative.  Negative for sleep disturbance.    All other systems reviewed and are negative.      Objective:      Physical Exam  Vitals and nursing note reviewed. Exam conducted with a chaperone present (Mom).   Constitutional:       General: She is not in acute distress.     Appearance: Normal appearance. She is well-developed.   HENT:      Head: Normocephalic and atraumatic.   Eyes:      Pupils: Pupils are equal, round, and reactive to light.   Cardiovascular:      Rate and Rhythm: Normal rate and regular rhythm.      Pulses: Normal pulses.      Heart sounds: Normal heart sounds. No murmur heard.  Pulmonary:      Effort: Pulmonary effort is normal. No respiratory distress.      Breath sounds: Normal breath sounds.   Abdominal:      Tenderness: There is no right CVA tenderness or left CVA tenderness.   Musculoskeletal:         General:  Normal range of motion.      Cervical back: Normal range of motion and neck supple.   Skin:     General: Skin is warm and dry.      Comments: Red scabby lesion at the right side of the face about 1 cm round. Another lesion at the right face adjacent to the nose is about 1/2 cm round, red  - no scabbing.   Neurological:      Mental Status: She is alert and oriented to person, place, and time.   Psychiatric:         Mood and Affect: Mood normal.         Assessment:       1. Impetigo        Plan:     1. Impetigo  -     cephALEXin (KEFLEX) 500 MG capsule; Take 1 capsule (500 mg total) by mouth every 12 (twelve) hours. for 10 days  Dispense: 20 capsule; Refill: 0  -     mupirocin (BACTROBAN) 2 % ointment; Apply topically 2 (two) times daily.  Dispense: 22 g; Refill: 1    RTC PRN - use padding on her CPAP to take the pressure off of the lesions

## 2025-05-13 ENCOUNTER — TELEPHONE (OUTPATIENT)
Dept: FAMILY MEDICINE | Facility: CLINIC | Age: 60
End: 2025-05-13
Payer: MEDICAID

## 2025-05-13 DIAGNOSIS — E89.40 SURGICAL MENOPAUSE: ICD-10-CM

## 2025-05-13 NOTE — TELEPHONE ENCOUNTER
----- Message from Deidra sent at 5/13/2025  8:10 AM CDT -----  Contact: SELF  Jessi BaezDeRN: [id number]: 1965PCP: David AllredHome Phone      [phone] Phone      Not on file.Mobile          [phone]: Pt requesting refill or new Rx. Is this a refill or new RX:  refillRX name and strength: estrogens, conjugated, (PREMARIN) 0.3 MG tablet oxybutynin (DITROPAN-XL) 5 MG HD81Hgfc office visit: 2/13/2025Is this a 30-day or 90-day RX:  30 DAYPharmacy name and location:   [facility]/PHARMACY #[id number] - [place] [facility] [address] Comments: Phone:  [phone]

## 2025-05-13 NOTE — TELEPHONE ENCOUNTER
LOV 02/13/2025      Patient requesting refill for   estrogens, conjugated, (PREMARIN) 0.3 MG tablet 30 tablet 5 11/25/2024         Rx pending   Pharmacy confirmed  Please advise

## 2025-05-15 LAB
LEFT EYE DM RETINOPATHY: POSITIVE
RIGHT EYE DM RETINOPATHY: POSITIVE

## 2025-07-10 DIAGNOSIS — E11.9 TYPE 2 DIABETES MELLITUS WITHOUT COMPLICATION, WITHOUT LONG-TERM CURRENT USE OF INSULIN: ICD-10-CM

## 2025-07-10 DIAGNOSIS — G43.009 MIGRAINE WITHOUT AURA AND WITHOUT STATUS MIGRAINOSUS, NOT INTRACTABLE: ICD-10-CM

## 2025-07-10 RX ORDER — ATORVASTATIN CALCIUM 10 MG/1
10 TABLET, FILM COATED ORAL DAILY
Qty: 30 TABLET | Refills: 5 | Status: SHIPPED | OUTPATIENT
Start: 2025-07-10 | End: 2026-07-10

## 2025-07-10 RX ORDER — METFORMIN HYDROCHLORIDE 500 MG/1
500 TABLET ORAL
Qty: 30 TABLET | Refills: 5 | Status: SHIPPED | OUTPATIENT
Start: 2025-07-10 | End: 2026-07-10

## 2025-07-10 RX ORDER — PROPRANOLOL HYDROCHLORIDE 20 MG/1
20 TABLET ORAL 2 TIMES DAILY
Qty: 60 TABLET | Refills: 5 | Status: SHIPPED | OUTPATIENT
Start: 2025-07-10

## 2025-07-10 NOTE — TELEPHONE ENCOUNTER
Care Due:                  Date            Visit Type   Department     Provider  --------------------------------------------------------------------------------                                EP -                              [facility]  Last Visit: 02-      CARE ([facility])   CHRISTINA Allred                              EP -                              PRIMARY      [facility]  Next Visit: 08-      CARE ([facility])   CHRISTINA Allred                                                            Last  Test          Frequency    Reason                     Performed    Due Date  --------------------------------------------------------------------------------    HBA1C.......  6 months...  metFORMIN................  02- 08-    Lipid Panel.  12 months..  atorvastatin.............  07- 07-    Health [facility] Embedded Care Due Messages. Reference number: [id number].   7/10/2025 9:57:58 AM CDT

## 2025-07-10 NOTE — TELEPHONE ENCOUNTER
LOV:  2/13/25   Pt requesting refill of:    Metformin 500 mg                                        Propranolol 20 mg                                        Atorvastatin 10 mg  Medication pended     Please advise

## 2025-07-10 NOTE — TELEPHONE ENCOUNTER
----- Message from Ephraim sent at 7/10/2025  8:08 AM CDT -----  Contact: Patient  Jessi Linton  MRN: [id number]  : 1965  PCP: David Allred  Home Phone      [phone]  Work Phone      Not on file.  Mobile          [phone]      MESSAGE:   Pt requesting refill or new Rx.   Is this a refill or new RX:  refills  RX name and strength: Metformin 500 mg                                        Propranolol 20 mg                                        Atorvastatin 10 mg  Last office visit: 25  Is this a 30-day or 90-day RX:  30 day  [facility] [place]  Comments:      Phone:  [phone]    PCP: Brigida

## 2025-08-11 ENCOUNTER — PATIENT OUTREACH (OUTPATIENT)
Dept: ADMINISTRATIVE | Facility: HOSPITAL | Age: 60
End: 2025-08-11
Payer: MEDICAID

## 2025-08-21 ENCOUNTER — CLINICAL SUPPORT (OUTPATIENT)
Dept: FAMILY MEDICINE | Facility: CLINIC | Age: 60
End: 2025-08-21
Payer: MEDICAID

## 2025-08-21 DIAGNOSIS — E78.5 HYPERLIPIDEMIA ASSOCIATED WITH TYPE 2 DIABETES MELLITUS: ICD-10-CM

## 2025-08-21 DIAGNOSIS — E11.9 TYPE 2 DIABETES MELLITUS WITHOUT COMPLICATION, WITHOUT LONG-TERM CURRENT USE OF INSULIN: Primary | ICD-10-CM

## 2025-08-21 DIAGNOSIS — I10 ESSENTIAL HYPERTENSION: ICD-10-CM

## 2025-08-21 DIAGNOSIS — E11.69 HYPERLIPIDEMIA ASSOCIATED WITH TYPE 2 DIABETES MELLITUS: ICD-10-CM

## 2025-08-21 LAB
ABSOLUTE EOSINOPHIL (OHS): 0.11 K/UL
ABSOLUTE MONOCYTE (OHS): 0.61 K/UL (ref 0.3–1)
ABSOLUTE NEUTROPHIL COUNT (OHS): 5.48 K/UL (ref 1.8–7.7)
ALBUMIN SERPL BCP-MCNC: 3.5 G/DL (ref 3.5–5.2)
ALP SERPL-CCNC: 77 UNIT/L (ref 40–150)
ALT SERPL W/O P-5'-P-CCNC: 26 UNIT/L (ref 10–44)
ANION GAP (OHS): 10 MMOL/L (ref 8–16)
AST SERPL-CCNC: 23 UNIT/L (ref 11–45)
BASOPHILS # BLD AUTO: 0.05 K/UL
BASOPHILS NFR BLD AUTO: 0.7 %
BILIRUB SERPL-MCNC: 0.7 MG/DL (ref 0.1–1)
BUN SERPL-MCNC: 22 MG/DL (ref 6–20)
CALCIUM SERPL-MCNC: 9.4 MG/DL (ref 8.7–10.5)
CHLORIDE SERPL-SCNC: 104 MMOL/L (ref 95–110)
CHOLEST SERPL-MCNC: 148 MG/DL (ref 120–199)
CHOLEST/HDLC SERPL: 3.1 {RATIO} (ref 2–5)
CO2 SERPL-SCNC: 29 MMOL/L (ref 23–29)
CREAT SERPL-MCNC: 0.7 MG/DL (ref 0.5–1.4)
EAG (OHS): 126 MG/DL (ref 68–131)
ERYTHROCYTE [DISTWIDTH] IN BLOOD BY AUTOMATED COUNT: 12.8 % (ref 11.5–14.5)
GFR SERPLBLD CREATININE-BSD FMLA CKD-EPI: >60 ML/MIN/1.73/M2
GLUCOSE SERPL-MCNC: 102 MG/DL (ref 70–110)
HBA1C MFR BLD: 6 % (ref 4–5.6)
HCT VFR BLD AUTO: 41.4 % (ref 37–48.5)
HDLC SERPL-MCNC: 48 MG/DL (ref 40–75)
HDLC SERPL: 32.4 % (ref 20–50)
HGB BLD-MCNC: 13.3 GM/DL (ref 12–16)
IMM GRANULOCYTES # BLD AUTO: 0.04 K/UL (ref 0–0.04)
IMM GRANULOCYTES NFR BLD AUTO: 0.5 % (ref 0–0.5)
LDLC SERPL CALC-MCNC: 80.8 MG/DL (ref 63–159)
LYMPHOCYTES # BLD AUTO: 1.3 K/UL (ref 1–4.8)
MCH RBC QN AUTO: 31.9 PG (ref 27–31)
MCHC RBC AUTO-ENTMCNC: 32.1 G/DL (ref 32–36)
MCV RBC AUTO: 99 FL (ref 82–98)
NONHDLC SERPL-MCNC: 100 MG/DL
NUCLEATED RBC (/100WBC) (OHS): 0 /100 WBC
PLATELET # BLD AUTO: 236 K/UL (ref 150–450)
PMV BLD AUTO: 10.7 FL (ref 9.2–12.9)
POTASSIUM SERPL-SCNC: 4.3 MMOL/L (ref 3.5–5.1)
PROT SERPL-MCNC: 7.1 GM/DL (ref 6–8.4)
RBC # BLD AUTO: 4.17 M/UL (ref 4–5.4)
RELATIVE EOSINOPHIL (OHS): 1.4 %
RELATIVE LYMPHOCYTE (OHS): 17.1 % (ref 18–48)
RELATIVE MONOCYTE (OHS): 8 % (ref 4–15)
RELATIVE NEUTROPHIL (OHS): 72.3 % (ref 38–73)
SODIUM SERPL-SCNC: 143 MMOL/L (ref 136–145)
TRIGL SERPL-MCNC: 96 MG/DL (ref 30–150)
TSH SERPL-ACNC: 2.25 UIU/ML (ref 0.4–4)
WBC # BLD AUTO: 7.59 K/UL (ref 3.9–12.7)

## 2025-08-21 PROCEDURE — 80061 LIPID PANEL: CPT

## 2025-08-21 PROCEDURE — 82570 ASSAY OF URINE CREATININE: CPT | Performed by: FAMILY MEDICINE

## 2025-08-21 PROCEDURE — 85025 COMPLETE CBC W/AUTO DIFF WBC: CPT

## 2025-08-21 PROCEDURE — 82040 ASSAY OF SERUM ALBUMIN: CPT

## 2025-08-21 PROCEDURE — 36415 COLL VENOUS BLD VENIPUNCTURE: CPT | Mod: PBBFAC

## 2025-08-21 PROCEDURE — 84443 ASSAY THYROID STIM HORMONE: CPT

## 2025-08-21 PROCEDURE — 83036 HEMOGLOBIN GLYCOSYLATED A1C: CPT

## 2025-08-21 PROCEDURE — 99999PBSHW PR PBB SHADOW TECHNICAL ONLY FILED TO HB: Mod: PBBFAC,,,

## 2025-08-28 ENCOUNTER — OFFICE VISIT (OUTPATIENT)
Dept: FAMILY MEDICINE | Facility: CLINIC | Age: 60
End: 2025-08-28
Payer: MEDICAID

## 2025-08-28 VITALS
BODY MASS INDEX: 53.49 KG/M2 | RESPIRATION RATE: 18 BRPM | HEART RATE: 72 BPM | TEMPERATURE: 98 F | DIASTOLIC BLOOD PRESSURE: 80 MMHG | WEIGHT: 231.13 LBS | HEIGHT: 55 IN | SYSTOLIC BLOOD PRESSURE: 128 MMHG | OXYGEN SATURATION: 97 %

## 2025-08-28 DIAGNOSIS — R60.9 DEPENDENT EDEMA: Primary | ICD-10-CM

## 2025-08-28 DIAGNOSIS — I10 ESSENTIAL HYPERTENSION: ICD-10-CM

## 2025-08-28 DIAGNOSIS — E78.5 HYPERLIPIDEMIA ASSOCIATED WITH TYPE 2 DIABETES MELLITUS: ICD-10-CM

## 2025-08-28 DIAGNOSIS — E66.01 MORBID OBESITY: ICD-10-CM

## 2025-08-28 DIAGNOSIS — Z12.11 SCREEN FOR COLON CANCER: ICD-10-CM

## 2025-08-28 DIAGNOSIS — G47.33 OSA (OBSTRUCTIVE SLEEP APNEA): ICD-10-CM

## 2025-08-28 DIAGNOSIS — E11.69 HYPERLIPIDEMIA ASSOCIATED WITH TYPE 2 DIABETES MELLITUS: ICD-10-CM

## 2025-08-28 DIAGNOSIS — E89.40 SURGICAL MENOPAUSE: ICD-10-CM

## 2025-08-28 DIAGNOSIS — Z12.31 ENCOUNTER FOR SCREENING MAMMOGRAM FOR MALIGNANT NEOPLASM OF BREAST: ICD-10-CM

## 2025-08-28 DIAGNOSIS — E11.9 TYPE 2 DIABETES MELLITUS WITHOUT COMPLICATION, WITHOUT LONG-TERM CURRENT USE OF INSULIN: ICD-10-CM

## 2025-08-28 PROCEDURE — G2211 COMPLEX E/M VISIT ADD ON: HCPCS | Mod: ,,, | Performed by: FAMILY MEDICINE

## 2025-08-28 PROCEDURE — 4010F ACE/ARB THERAPY RXD/TAKEN: CPT | Mod: CPTII,,, | Performed by: FAMILY MEDICINE

## 2025-08-28 PROCEDURE — 99215 OFFICE O/P EST HI 40 MIN: CPT | Mod: PBBFAC | Performed by: FAMILY MEDICINE

## 2025-08-28 PROCEDURE — 3008F BODY MASS INDEX DOCD: CPT | Mod: CPTII,,, | Performed by: FAMILY MEDICINE

## 2025-08-28 PROCEDURE — 3079F DIAST BP 80-89 MM HG: CPT | Mod: CPTII,,, | Performed by: FAMILY MEDICINE

## 2025-08-28 PROCEDURE — 1160F RVW MEDS BY RX/DR IN RCRD: CPT | Mod: CPTII,,, | Performed by: FAMILY MEDICINE

## 2025-08-28 PROCEDURE — 3074F SYST BP LT 130 MM HG: CPT | Mod: CPTII,,, | Performed by: FAMILY MEDICINE

## 2025-08-28 PROCEDURE — 99214 OFFICE O/P EST MOD 30 MIN: CPT | Mod: S$PBB,,, | Performed by: FAMILY MEDICINE

## 2025-08-28 PROCEDURE — 3044F HG A1C LEVEL LT 7.0%: CPT | Mod: CPTII,,, | Performed by: FAMILY MEDICINE

## 2025-08-28 PROCEDURE — 3066F NEPHROPATHY DOC TX: CPT | Mod: CPTII,,, | Performed by: FAMILY MEDICINE

## 2025-08-28 PROCEDURE — 2022F DILAT RTA XM EVC RTNOPTHY: CPT | Mod: CPTII,,, | Performed by: FAMILY MEDICINE

## 2025-08-28 PROCEDURE — 3061F NEG MICROALBUMINURIA REV: CPT | Mod: CPTII,,, | Performed by: FAMILY MEDICINE

## 2025-08-28 PROCEDURE — 1159F MED LIST DOCD IN RCRD: CPT | Mod: CPTII,,, | Performed by: FAMILY MEDICINE

## 2025-08-28 PROCEDURE — 99999 PR PBB SHADOW E&M-EST. PATIENT-LVL V: CPT | Mod: PBBFAC,,, | Performed by: FAMILY MEDICINE

## 2025-08-28 RX ORDER — OXYBUTYNIN CHLORIDE 5 MG/1
5 TABLET, EXTENDED RELEASE ORAL
COMMUNITY
Start: 2025-04-07 | End: 2025-08-28 | Stop reason: SDUPTHER

## 2025-08-28 RX ORDER — SEMAGLUTIDE 2.68 MG/ML
2 INJECTION, SOLUTION SUBCUTANEOUS
COMMUNITY
Start: 2025-08-06

## 2025-08-28 RX ORDER — OXYBUTYNIN CHLORIDE 5 MG/1
5 TABLET, EXTENDED RELEASE ORAL DAILY
Qty: 90 TABLET | Refills: 3 | Status: SHIPPED | OUTPATIENT
Start: 2025-08-28

## 2025-08-29 ENCOUNTER — TELEPHONE (OUTPATIENT)
Dept: FAMILY MEDICINE | Facility: CLINIC | Age: 60
End: 2025-08-29
Payer: MEDICAID

## 2025-08-29 DIAGNOSIS — Z12.11 COLON CANCER SCREENING: Primary | ICD-10-CM

## 2025-09-03 ENCOUNTER — PATIENT OUTREACH (OUTPATIENT)
Dept: ADMINISTRATIVE | Facility: HOSPITAL | Age: 60
End: 2025-09-03
Payer: MEDICAID